# Patient Record
Sex: MALE | Race: WHITE | Employment: FULL TIME | URBAN - NONMETROPOLITAN AREA
[De-identification: names, ages, dates, MRNs, and addresses within clinical notes are randomized per-mention and may not be internally consistent; named-entity substitution may affect disease eponyms.]

---

## 2022-09-15 ENCOUNTER — HOSPITAL ENCOUNTER (INPATIENT)
Age: 62
LOS: 7 days | Discharge: HOME HEALTH | DRG: 252 | End: 2022-09-22
Attending: EMERGENCY MEDICINE | Admitting: STUDENT IN AN ORGANIZED HEALTH CARE EDUCATION/TRAINING PROGRAM
Payer: COMMERCIAL

## 2022-09-15 ENCOUNTER — APPOINTMENT (OUTPATIENT)
Dept: INTERVENTIONAL RADIOLOGY/VASCULAR | Age: 62
DRG: 252 | End: 2022-09-15
Payer: COMMERCIAL

## 2022-09-15 ENCOUNTER — APPOINTMENT (OUTPATIENT)
Dept: GENERAL RADIOLOGY | Age: 62
DRG: 252 | End: 2022-09-15
Payer: COMMERCIAL

## 2022-09-15 ENCOUNTER — APPOINTMENT (OUTPATIENT)
Dept: CT IMAGING | Age: 62
DRG: 252 | End: 2022-09-15
Payer: COMMERCIAL

## 2022-09-15 DIAGNOSIS — I82.403 ACUTE DEEP VEIN THROMBOSIS (DVT) OF BOTH LOWER EXTREMITIES, UNSPECIFIED VEIN (HCC): ICD-10-CM

## 2022-09-15 DIAGNOSIS — I80.202 PHLEGMASIA CERULEA DOLENS OF LEFT LOWER EXTREMITY (HCC): ICD-10-CM

## 2022-09-15 DIAGNOSIS — I26.94 MULTIPLE SUBSEGMENTAL PULMONARY EMBOLI WITHOUT ACUTE COR PULMONALE (HCC): Primary | ICD-10-CM

## 2022-09-15 DIAGNOSIS — R60.9 SWELLING: ICD-10-CM

## 2022-09-15 DIAGNOSIS — R06.02 SOB (SHORTNESS OF BREATH): ICD-10-CM

## 2022-09-15 PROBLEM — I26.99 BILATERAL PULMONARY EMBOLISM (HCC): Status: ACTIVE | Noted: 2022-09-15

## 2022-09-15 LAB
ALBUMIN SERPL-MCNC: 3.6 G/DL (ref 3.5–5.1)
ALP BLD-CCNC: 123 U/L (ref 38–126)
ALT SERPL-CCNC: 47 U/L (ref 11–66)
ANION GAP SERPL CALCULATED.3IONS-SCNC: 16 MEQ/L (ref 8–16)
APTT: 30.8 SECONDS (ref 22–38)
AST SERPL-CCNC: 29 U/L (ref 5–40)
BASE EXCESS MIXED: -2 MMOL/L (ref -2–3)
BASOPHILS # BLD: 0.6 %
BASOPHILS ABSOLUTE: 0.1 THOU/MM3 (ref 0–0.1)
BILIRUB SERPL-MCNC: 1.3 MG/DL (ref 0.3–1.2)
BUN BLDV-MCNC: 41 MG/DL (ref 7–22)
CALCIUM SERPL-MCNC: 9.4 MG/DL (ref 8.5–10.5)
CHLORIDE BLD-SCNC: 93 MEQ/L (ref 98–111)
CO2: 20 MEQ/L (ref 23–33)
COLLECTED BY:: ABNORMAL
CREAT SERPL-MCNC: 2.1 MG/DL (ref 0.4–1.2)
CREATININE URINE: 173.3 MG/DL
EOSINOPHIL # BLD: 0.1 %
EOSINOPHILS ABSOLUTE: 0 THOU/MM3 (ref 0–0.4)
ERYTHROCYTE [DISTWIDTH] IN BLOOD BY AUTOMATED COUNT: 15.5 % (ref 11.5–14.5)
ERYTHROCYTE [DISTWIDTH] IN BLOOD BY AUTOMATED COUNT: 50.4 FL (ref 35–45)
GFR SERPL CREATININE-BSD FRML MDRD: 32 ML/MIN/1.73M2
GLUCOSE BLD-MCNC: 132 MG/DL (ref 70–108)
HCO3, MIXED: 21 MMOL/L (ref 23–28)
HCT VFR BLD CALC: 38.6 % (ref 42–52)
HEMOGLOBIN: 12.8 GM/DL (ref 14–18)
IMMATURE GRANS (ABS): 0.51 THOU/MM3 (ref 0–0.07)
IMMATURE GRANULOCYTES: 2.1 %
INR BLD: 1.26 (ref 0.85–1.13)
LACTIC ACID, SEPSIS: 1.7 MMOL/L (ref 0.5–1.9)
LYMPHOCYTES # BLD: 7 %
LYMPHOCYTES ABSOLUTE: 1.7 THOU/MM3 (ref 1–4.8)
MCH RBC QN AUTO: 29.4 PG (ref 26–33)
MCHC RBC AUTO-ENTMCNC: 33.2 GM/DL (ref 32.2–35.5)
MCV RBC AUTO: 88.5 FL (ref 80–94)
MONOCYTES # BLD: 6 %
MONOCYTES ABSOLUTE: 1.5 THOU/MM3 (ref 0.4–1.3)
NUCLEATED RED BLOOD CELLS: 0 /100 WBC
O2 SAT, MIXED: 93 %
OSMOLALITY CALCULATION: 270.9 MOSMOL/KG (ref 275–300)
PCO2, MIXED VENOUS: 29 MMHG (ref 41–51)
PH, MIXED: 7.46 (ref 7.31–7.41)
PLATELET # BLD: 568 THOU/MM3 (ref 130–400)
PLATELET ESTIMATE: ABNORMAL
PMV BLD AUTO: 11 FL (ref 9.4–12.4)
PO2 MIXED: 62 MMHG (ref 25–40)
POTASSIUM REFLEX MAGNESIUM: 5.6 MEQ/L (ref 3.5–5.2)
PRO-BNP: 1422 PG/ML (ref 0–900)
PROCALCITONIN: 0.54 NG/ML (ref 0.01–0.09)
RBC # BLD: 4.36 MILL/MM3 (ref 4.7–6.1)
SCAN OF BLOOD SMEAR: NORMAL
SEG NEUTROPHILS: 84.2 %
SEGMENTED NEUTROPHILS ABSOLUTE COUNT: 20.6 THOU/MM3 (ref 1.8–7.7)
SODIUM BLD-SCNC: 129 MEQ/L (ref 135–145)
SODIUM URINE: 31 MEQ/L
TOTAL PROTEIN: 8.1 G/DL (ref 6.1–8)
TROPONIN T: < 0.01 NG/ML
WBC # BLD: 24.5 THOU/MM3 (ref 4.8–10.8)

## 2022-09-15 PROCEDURE — 75825 VEIN X-RAY TRUNK: CPT

## 2022-09-15 PROCEDURE — 96374 THER/PROPH/DIAG INJ IV PUSH: CPT

## 2022-09-15 PROCEDURE — 85730 THROMBOPLASTIN TIME PARTIAL: CPT

## 2022-09-15 PROCEDURE — 75820 VEIN X-RAY ARM/LEG: CPT

## 2022-09-15 PROCEDURE — 2720000010 IR GUIDED THROMB MECH VEIN

## 2022-09-15 PROCEDURE — 71275 CT ANGIOGRAPHY CHEST: CPT

## 2022-09-15 PROCEDURE — 36010 PLACE CATHETER IN VEIN: CPT

## 2022-09-15 PROCEDURE — 2060000000 HC ICU INTERMEDIATE R&B

## 2022-09-15 PROCEDURE — 85610 PROTHROMBIN TIME: CPT

## 2022-09-15 PROCEDURE — 81001 URINALYSIS AUTO W/SCOPE: CPT

## 2022-09-15 PROCEDURE — 6360000002 HC RX W HCPCS: Performed by: RADIOLOGY

## 2022-09-15 PROCEDURE — 83605 ASSAY OF LACTIC ACID: CPT

## 2022-09-15 PROCEDURE — 96361 HYDRATE IV INFUSION ADD-ON: CPT

## 2022-09-15 PROCEDURE — 85025 COMPLETE CBC W/AUTO DIFF WBC: CPT

## 2022-09-15 PROCEDURE — 76499 UNLISTED DX RADIOGRAPHIC PX: CPT

## 2022-09-15 PROCEDURE — 93005 ELECTROCARDIOGRAM TRACING: CPT | Performed by: STUDENT IN AN ORGANIZED HEALTH CARE EDUCATION/TRAINING PROGRAM

## 2022-09-15 PROCEDURE — 84145 PROCALCITONIN (PCT): CPT

## 2022-09-15 PROCEDURE — 80053 COMPREHEN METABOLIC PANEL: CPT

## 2022-09-15 PROCEDURE — 83880 ASSAY OF NATRIURETIC PEPTIDE: CPT

## 2022-09-15 PROCEDURE — 37248 TRLUML BALO ANGIOP 1ST VEIN: CPT

## 2022-09-15 PROCEDURE — 84300 ASSAY OF URINE SODIUM: CPT

## 2022-09-15 PROCEDURE — 6360000004 HC RX CONTRAST MEDICATION: Performed by: RADIOLOGY

## 2022-09-15 PROCEDURE — 2580000003 HC RX 258: Performed by: STUDENT IN AN ORGANIZED HEALTH CARE EDUCATION/TRAINING PROGRAM

## 2022-09-15 PROCEDURE — 99285 EMERGENCY DEPT VISIT HI MDM: CPT

## 2022-09-15 PROCEDURE — 37249 TRLUML BALO ANGIOP ADDL VEIN: CPT

## 2022-09-15 PROCEDURE — 6360000004 HC RX CONTRAST MEDICATION: Performed by: STUDENT IN AN ORGANIZED HEALTH CARE EDUCATION/TRAINING PROGRAM

## 2022-09-15 PROCEDURE — 6360000002 HC RX W HCPCS: Performed by: STUDENT IN AN ORGANIZED HEALTH CARE EDUCATION/TRAINING PROGRAM

## 2022-09-15 PROCEDURE — 71045 X-RAY EXAM CHEST 1 VIEW: CPT

## 2022-09-15 PROCEDURE — 37187 VENOUS MECH THROMBECTOMY: CPT

## 2022-09-15 PROCEDURE — 96365 THER/PROPH/DIAG IV INF INIT: CPT

## 2022-09-15 PROCEDURE — 047C3ZZ DILATION OF RIGHT COMMON ILIAC ARTERY, PERCUTANEOUS APPROACH: ICD-10-PCS | Performed by: RADIOLOGY

## 2022-09-15 PROCEDURE — 82803 BLOOD GASES ANY COMBINATION: CPT

## 2022-09-15 PROCEDURE — 96375 TX/PRO/DX INJ NEW DRUG ADDON: CPT

## 2022-09-15 PROCEDURE — 93970 EXTREMITY STUDY: CPT

## 2022-09-15 PROCEDURE — 82570 ASSAY OF URINE CREATININE: CPT

## 2022-09-15 PROCEDURE — 069D3ZZ DRAINAGE OF LEFT COMMON ILIAC VEIN, PERCUTANEOUS APPROACH: ICD-10-PCS | Performed by: RADIOLOGY

## 2022-09-15 PROCEDURE — 84484 ASSAY OF TROPONIN QUANT: CPT

## 2022-09-15 PROCEDURE — 99223 1ST HOSP IP/OBS HIGH 75: CPT | Performed by: STUDENT IN AN ORGANIZED HEALTH CARE EDUCATION/TRAINING PROGRAM

## 2022-09-15 RX ORDER — HEPARIN SODIUM 1000 [USP'U]/ML
INJECTION, SOLUTION INTRAVENOUS; SUBCUTANEOUS
Status: COMPLETED | OUTPATIENT
Start: 2022-09-15 | End: 2022-09-15

## 2022-09-15 RX ORDER — 0.9 % SODIUM CHLORIDE 0.9 %
1000 INTRAVENOUS SOLUTION INTRAVENOUS ONCE
Status: COMPLETED | OUTPATIENT
Start: 2022-09-15 | End: 2022-09-15

## 2022-09-15 RX ORDER — MORPHINE SULFATE 2 MG/ML
2 INJECTION, SOLUTION INTRAMUSCULAR; INTRAVENOUS
Status: DISCONTINUED | OUTPATIENT
Start: 2022-09-15 | End: 2022-09-22 | Stop reason: HOSPADM

## 2022-09-15 RX ORDER — HEPARIN SODIUM 1000 [USP'U]/ML
40 INJECTION, SOLUTION INTRAVENOUS; SUBCUTANEOUS PRN
Status: DISCONTINUED | OUTPATIENT
Start: 2022-09-15 | End: 2022-09-17

## 2022-09-15 RX ORDER — KETOROLAC TROMETHAMINE 30 MG/ML
15 INJECTION, SOLUTION INTRAMUSCULAR; INTRAVENOUS EVERY 6 HOURS PRN
Status: DISCONTINUED | OUTPATIENT
Start: 2022-09-15 | End: 2022-09-15

## 2022-09-15 RX ORDER — LISINOPRIL 20 MG/1
20 TABLET ORAL DAILY
Status: DISCONTINUED | OUTPATIENT
Start: 2022-09-16 | End: 2022-09-18

## 2022-09-15 RX ORDER — POLYETHYLENE GLYCOL 3350 17 G/17G
17 POWDER, FOR SOLUTION ORAL DAILY PRN
Status: DISCONTINUED | OUTPATIENT
Start: 2022-09-15 | End: 2022-09-22 | Stop reason: HOSPADM

## 2022-09-15 RX ORDER — HEPARIN SODIUM 10000 [USP'U]/100ML
5-30 INJECTION, SOLUTION INTRAVENOUS CONTINUOUS
Status: DISCONTINUED | OUTPATIENT
Start: 2022-09-15 | End: 2022-09-17

## 2022-09-15 RX ORDER — SODIUM CHLORIDE 0.9 % (FLUSH) 0.9 %
5-40 SYRINGE (ML) INJECTION EVERY 12 HOURS SCHEDULED
Status: DISCONTINUED | OUTPATIENT
Start: 2022-09-15 | End: 2022-09-22 | Stop reason: HOSPADM

## 2022-09-15 RX ORDER — MIDAZOLAM HYDROCHLORIDE 1 MG/ML
INJECTION INTRAMUSCULAR; INTRAVENOUS
Status: COMPLETED | OUTPATIENT
Start: 2022-09-15 | End: 2022-09-15

## 2022-09-15 RX ORDER — SODIUM CHLORIDE 9 MG/ML
INJECTION, SOLUTION INTRAVENOUS PRN
Status: DISCONTINUED | OUTPATIENT
Start: 2022-09-15 | End: 2022-09-22 | Stop reason: HOSPADM

## 2022-09-15 RX ORDER — FENTANYL CITRATE 50 UG/ML
INJECTION, SOLUTION INTRAMUSCULAR; INTRAVENOUS
Status: COMPLETED | OUTPATIENT
Start: 2022-09-15 | End: 2022-09-15

## 2022-09-15 RX ORDER — ONDANSETRON 4 MG/1
4 TABLET, ORALLY DISINTEGRATING ORAL EVERY 8 HOURS PRN
Status: DISCONTINUED | OUTPATIENT
Start: 2022-09-15 | End: 2022-09-22 | Stop reason: HOSPADM

## 2022-09-15 RX ORDER — ACETAMINOPHEN 325 MG/1
650 TABLET ORAL EVERY 6 HOURS PRN
Status: DISCONTINUED | OUTPATIENT
Start: 2022-09-15 | End: 2022-09-22 | Stop reason: HOSPADM

## 2022-09-15 RX ORDER — MORPHINE SULFATE 4 MG/ML
4 INJECTION, SOLUTION INTRAMUSCULAR; INTRAVENOUS
Status: DISCONTINUED | OUTPATIENT
Start: 2022-09-15 | End: 2022-09-22 | Stop reason: HOSPADM

## 2022-09-15 RX ORDER — HEPARIN SODIUM 1000 [USP'U]/ML
80 INJECTION, SOLUTION INTRAVENOUS; SUBCUTANEOUS PRN
Status: DISCONTINUED | OUTPATIENT
Start: 2022-09-15 | End: 2022-09-17

## 2022-09-15 RX ORDER — SODIUM CHLORIDE 9 MG/ML
INJECTION, SOLUTION INTRAVENOUS CONTINUOUS
Status: DISCONTINUED | OUTPATIENT
Start: 2022-09-15 | End: 2022-09-19

## 2022-09-15 RX ORDER — HEPARIN SODIUM 1000 [USP'U]/ML
80 INJECTION, SOLUTION INTRAVENOUS; SUBCUTANEOUS ONCE
Status: COMPLETED | OUTPATIENT
Start: 2022-09-15 | End: 2022-09-15

## 2022-09-15 RX ORDER — SODIUM CHLORIDE 0.9 % (FLUSH) 0.9 %
5-40 SYRINGE (ML) INJECTION PRN
Status: DISCONTINUED | OUTPATIENT
Start: 2022-09-15 | End: 2022-09-22 | Stop reason: HOSPADM

## 2022-09-15 RX ORDER — DOCUSATE SODIUM 100 MG/1
100 CAPSULE, LIQUID FILLED ORAL 2 TIMES DAILY PRN
Status: DISCONTINUED | OUTPATIENT
Start: 2022-09-15 | End: 2022-09-22 | Stop reason: HOSPADM

## 2022-09-15 RX ORDER — ONDANSETRON 2 MG/ML
4 INJECTION INTRAMUSCULAR; INTRAVENOUS EVERY 6 HOURS PRN
Status: DISCONTINUED | OUTPATIENT
Start: 2022-09-15 | End: 2022-09-22 | Stop reason: HOSPADM

## 2022-09-15 RX ADMIN — FENTANYL CITRATE 25 MCG: 50 INJECTION, SOLUTION INTRAMUSCULAR; INTRAVENOUS at 21:59

## 2022-09-15 RX ADMIN — IOPAMIDOL 70 ML: 612 INJECTION, SOLUTION INTRAVENOUS at 23:15

## 2022-09-15 RX ADMIN — HEPARIN SODIUM AND DEXTROSE 18 UNITS/KG/HR: 10000; 5 INJECTION INTRAVENOUS at 19:44

## 2022-09-15 RX ADMIN — HEPARIN SODIUM 6310 UNITS: 1000 INJECTION, SOLUTION INTRAVENOUS; SUBCUTANEOUS at 19:35

## 2022-09-15 RX ADMIN — IOPAMIDOL 80 ML: 755 INJECTION, SOLUTION INTRAVENOUS at 18:29

## 2022-09-15 RX ADMIN — MIDAZOLAM 0.5 MG: 1 INJECTION INTRAMUSCULAR; INTRAVENOUS at 21:58

## 2022-09-15 RX ADMIN — MIDAZOLAM 0.5 MG: 1 INJECTION INTRAMUSCULAR; INTRAVENOUS at 22:25

## 2022-09-15 RX ADMIN — HEPARIN SODIUM 2000 UNITS: 1000 INJECTION INTRAVENOUS; SUBCUTANEOUS at 22:41

## 2022-09-15 RX ADMIN — SODIUM CHLORIDE 1000 ML: 9 INJECTION, SOLUTION INTRAVENOUS at 17:54

## 2022-09-15 RX ADMIN — PIPERACILLIN AND TAZOBACTAM 4500 MG: 4; .5 INJECTION, POWDER, LYOPHILIZED, FOR SOLUTION INTRAVENOUS at 20:31

## 2022-09-15 RX ADMIN — FENTANYL CITRATE 25 MCG: 50 INJECTION, SOLUTION INTRAMUSCULAR; INTRAVENOUS at 22:25

## 2022-09-15 ASSESSMENT — PAIN - FUNCTIONAL ASSESSMENT
PAIN_FUNCTIONAL_ASSESSMENT: 0-10
PAIN_FUNCTIONAL_ASSESSMENT: 0-10

## 2022-09-15 ASSESSMENT — PAIN DESCRIPTION - ORIENTATION: ORIENTATION: LEFT

## 2022-09-15 ASSESSMENT — PAIN DESCRIPTION - LOCATION: LOCATION: LEG

## 2022-09-15 ASSESSMENT — PAIN SCALES - GENERAL
PAINLEVEL_OUTOF10: 4
PAINLEVEL_OUTOF10: 0
PAINLEVEL_OUTOF10: 7
PAINLEVEL_OUTOF10: 4

## 2022-09-15 NOTE — ED TRIAGE NOTES
Pt to rm 15 per EMS- sent in by [facility] for sudden onset SOB and left upper leg pain while at rehab today. Pt states he had AAA repair at the end of August in [place]- staples intact, incision well approximated. Pt LLE is noticeably more swollen than the right, aleksey in color and painful to palpation upper thigh. Difficulty palpating pedal pulse- doppler used to locate post tibial pulse. Pt states pain in leg 7/10- denies CP- states he feels very SOB- tachypnic, sats 98% on 3LNC but RR still not coming down.

## 2022-09-15 NOTE — ED NOTES
Dr Ledy Byrd at bedside for assessment.  Attempting to find LLE pulses w/doppler     Buffy Mejia RN  09/15/22 8551

## 2022-09-15 NOTE — ED NOTES
Pt heparin started. Pt denies a need for anything else at this time.      Chapincito Waddell RN  09/15/22 1946

## 2022-09-16 ENCOUNTER — APPOINTMENT (OUTPATIENT)
Dept: CT IMAGING | Age: 62
DRG: 252 | End: 2022-09-16
Payer: COMMERCIAL

## 2022-09-16 ENCOUNTER — APPOINTMENT (OUTPATIENT)
Dept: INTERVENTIONAL RADIOLOGY/VASCULAR | Age: 62
DRG: 252 | End: 2022-09-16
Payer: COMMERCIAL

## 2022-09-16 PROBLEM — D72.829 LEUKOCYTOSIS: Status: ACTIVE | Noted: 2022-09-16

## 2022-09-16 PROBLEM — E87.6 HYPOKALEMIA: Status: ACTIVE | Noted: 2022-09-16

## 2022-09-16 PROBLEM — I82.413 ACUTE BILATERAL DEEP VEIN THROMBOSIS (DVT) OF FEMORAL VEINS (HCC): Status: ACTIVE | Noted: 2022-09-16

## 2022-09-16 PROBLEM — E87.1 HYPONATREMIA: Status: ACTIVE | Noted: 2022-09-16

## 2022-09-16 PROBLEM — R53.81 PHYSICAL DECONDITIONING: Status: ACTIVE | Noted: 2022-09-16

## 2022-09-16 PROBLEM — R93.89 ABNORMAL CT OF THE CHEST: Status: ACTIVE | Noted: 2022-09-16

## 2022-09-16 PROBLEM — N17.9 AKI (ACUTE KIDNEY INJURY) (HCC): Status: ACTIVE | Noted: 2022-09-16

## 2022-09-16 PROBLEM — D75.839 THROMBOCYTOSIS: Status: ACTIVE | Noted: 2022-09-16

## 2022-09-16 PROBLEM — I26.94 MULTIPLE SUBSEGMENTAL PULMONARY EMBOLI WITHOUT ACUTE COR PULMONALE (HCC): Status: ACTIVE | Noted: 2022-09-15

## 2022-09-16 PROBLEM — I87.1: Status: ACTIVE | Noted: 2022-09-16

## 2022-09-16 LAB
ALBUMIN SERPL-MCNC: 2.8 G/DL (ref 3.5–5.1)
ALP BLD-CCNC: 99 U/L (ref 38–126)
ALT SERPL-CCNC: 36 U/L (ref 11–66)
ANION GAP SERPL CALCULATED.3IONS-SCNC: 13 MEQ/L (ref 8–16)
ANION GAP SERPL CALCULATED.3IONS-SCNC: 14 MEQ/L (ref 8–16)
AST SERPL-CCNC: 25 U/L (ref 5–40)
BACTERIA: ABNORMAL /HPF
BASOPHILS # BLD: 0.5 %
BASOPHILS ABSOLUTE: 0.1 THOU/MM3 (ref 0–0.1)
BILIRUB SERPL-MCNC: 1 MG/DL (ref 0.3–1.2)
BILIRUBIN URINE: NEGATIVE
BLOOD, URINE: ABNORMAL
BUN BLDV-MCNC: 41 MG/DL (ref 7–22)
BUN BLDV-MCNC: 43 MG/DL (ref 7–22)
CALCIUM SERPL-MCNC: 8.4 MG/DL (ref 8.5–10.5)
CALCIUM SERPL-MCNC: 8.5 MG/DL (ref 8.5–10.5)
CASTS 2: ABNORMAL /LPF
CASTS UA: ABNORMAL /LPF
CHARACTER, URINE: CLEAR
CHLORIDE BLD-SCNC: 95 MEQ/L (ref 98–111)
CHLORIDE BLD-SCNC: 96 MEQ/L (ref 98–111)
CO2: 20 MEQ/L (ref 23–33)
CO2: 22 MEQ/L (ref 23–33)
COLOR: ABNORMAL
CREAT SERPL-MCNC: 1.8 MG/DL (ref 0.4–1.2)
CREAT SERPL-MCNC: 1.9 MG/DL (ref 0.4–1.2)
CRYSTALS, UA: ABNORMAL
EKG ATRIAL RATE: 116 BPM
EKG ATRIAL RATE: 122 BPM
EKG P AXIS: 75 DEGREES
EKG P AXIS: 80 DEGREES
EKG P-R INTERVAL: 114 MS
EKG P-R INTERVAL: 118 MS
EKG Q-T INTERVAL: 306 MS
EKG Q-T INTERVAL: 324 MS
EKG QRS DURATION: 90 MS
EKG QRS DURATION: 92 MS
EKG QTC CALCULATION (BAZETT): 436 MS
EKG QTC CALCULATION (BAZETT): 450 MS
EKG R AXIS: 63 DEGREES
EKG R AXIS: 67 DEGREES
EKG T AXIS: 152 DEGREES
EKG T AXIS: 162 DEGREES
EKG VENTRICULAR RATE: 116 BPM
EKG VENTRICULAR RATE: 122 BPM
EOSINOPHIL # BLD: 0 %
EOSINOPHILS ABSOLUTE: 0 THOU/MM3 (ref 0–0.4)
EPITHELIAL CELLS, UA: ABNORMAL /HPF
ERYTHROCYTE [DISTWIDTH] IN BLOOD BY AUTOMATED COUNT: 15.4 % (ref 11.5–14.5)
ERYTHROCYTE [DISTWIDTH] IN BLOOD BY AUTOMATED COUNT: 50.1 FL (ref 35–45)
GFR SERPL CREATININE-BSD FRML MDRD: 36 ML/MIN/1.73M2
GFR SERPL CREATININE-BSD FRML MDRD: 39 ML/MIN/1.73M2
GLUCOSE BLD-MCNC: 117 MG/DL (ref 70–108)
GLUCOSE BLD-MCNC: 136 MG/DL (ref 70–108)
GLUCOSE URINE: NEGATIVE MG/DL
HCT VFR BLD CALC: 32 % (ref 42–52)
HEMOGLOBIN: 10.5 GM/DL (ref 14–18)
HEPARIN UNFRACTIONATED: 0.12 U/ML (ref 0.3–0.7)
HEPARIN UNFRACTIONATED: 0.15 U/ML (ref 0.3–0.7)
HEPARIN UNFRACTIONATED: 0.2 U/ML (ref 0.3–0.7)
HEPARIN UNFRACTIONATED: 0.3 U/ML (ref 0.3–0.7)
IMMATURE GRANS (ABS): 0.37 THOU/MM3 (ref 0–0.07)
IMMATURE GRANULOCYTES: 1.8 %
KETONES, URINE: NEGATIVE
LEUKOCYTE ESTERASE, URINE: NEGATIVE
LV EF: 40 %
LVEF MODALITY: NORMAL
LYMPHOCYTES # BLD: 8.6 %
LYMPHOCYTES ABSOLUTE: 1.8 THOU/MM3 (ref 1–4.8)
MAGNESIUM: 2 MG/DL (ref 1.6–2.4)
MCH RBC QN AUTO: 29.2 PG (ref 26–33)
MCHC RBC AUTO-ENTMCNC: 32.8 GM/DL (ref 32.2–35.5)
MCV RBC AUTO: 89.1 FL (ref 80–94)
MISCELLANEOUS 2: ABNORMAL
MONOCYTES # BLD: 7.3 %
MONOCYTES ABSOLUTE: 1.5 THOU/MM3 (ref 0.4–1.3)
NITRITE, URINE: NEGATIVE
NUCLEATED RED BLOOD CELLS: 0 /100 WBC
OSMOLALITY CALCULATION: 271.9 MOSMOL/KG (ref 275–300)
PH UA: 5 (ref 5–9)
PLATELET # BLD: 402 THOU/MM3 (ref 130–400)
PMV BLD AUTO: 10.6 FL (ref 9.4–12.4)
POTASSIUM REFLEX MAGNESIUM: 5 MEQ/L (ref 3.5–5.2)
POTASSIUM SERPL-SCNC: 4.5 MEQ/L (ref 3.5–5.2)
PROTEIN UA: ABNORMAL
RBC # BLD: 3.59 MILL/MM3 (ref 4.7–6.1)
RBC URINE: ABNORMAL /HPF
RENAL EPITHELIAL, UA: ABNORMAL
SEG NEUTROPHILS: 81.8 %
SEGMENTED NEUTROPHILS ABSOLUTE COUNT: 17 THOU/MM3 (ref 1.8–7.7)
SODIUM BLD-SCNC: 129 MEQ/L (ref 135–145)
SODIUM BLD-SCNC: 131 MEQ/L (ref 135–145)
SPECIFIC GRAVITY, URINE: 1.03 (ref 1–1.03)
TOTAL PROTEIN: 6.5 G/DL (ref 6.1–8)
UROBILINOGEN, URINE: 0.2 EU/DL (ref 0–1)
WBC # BLD: 20.8 THOU/MM3 (ref 4.8–10.8)
WBC UA: ABNORMAL /HPF
YEAST: ABNORMAL

## 2022-09-16 PROCEDURE — 80053 COMPREHEN METABOLIC PANEL: CPT

## 2022-09-16 PROCEDURE — 85520 HEPARIN ASSAY: CPT

## 2022-09-16 PROCEDURE — 6360000002 HC RX W HCPCS: Performed by: STUDENT IN AN ORGANIZED HEALTH CARE EDUCATION/TRAINING PROGRAM

## 2022-09-16 PROCEDURE — 6360000002 HC RX W HCPCS: Performed by: RADIOLOGY

## 2022-09-16 PROCEDURE — 2060000000 HC ICU INTERMEDIATE R&B

## 2022-09-16 PROCEDURE — 6360000004 HC RX CONTRAST MEDICATION: Performed by: RADIOLOGY

## 2022-09-16 PROCEDURE — 36415 COLL VENOUS BLD VENIPUNCTURE: CPT

## 2022-09-16 PROCEDURE — 6370000000 HC RX 637 (ALT 250 FOR IP): Performed by: STUDENT IN AN ORGANIZED HEALTH CARE EDUCATION/TRAINING PROGRAM

## 2022-09-16 PROCEDURE — 37238 OPEN/PERQ PLACE STENT SAME: CPT

## 2022-09-16 PROCEDURE — 36011 PLACE CATHETER IN VEIN: CPT

## 2022-09-16 PROCEDURE — 74176 CT ABD & PELVIS W/O CONTRAST: CPT

## 2022-09-16 PROCEDURE — 93010 ELECTROCARDIOGRAM REPORT: CPT | Performed by: INTERNAL MEDICINE

## 2022-09-16 PROCEDURE — 2580000003 HC RX 258: Performed by: STUDENT IN AN ORGANIZED HEALTH CARE EDUCATION/TRAINING PROGRAM

## 2022-09-16 PROCEDURE — 2709999900

## 2022-09-16 PROCEDURE — C1876 STENT, NON-COA/NON-COV W/DEL: HCPCS

## 2022-09-16 PROCEDURE — 83735 ASSAY OF MAGNESIUM: CPT

## 2022-09-16 PROCEDURE — 93306 TTE W/DOPPLER COMPLETE: CPT

## 2022-09-16 PROCEDURE — 37248 TRLUML BALO ANGIOP 1ST VEIN: CPT

## 2022-09-16 PROCEDURE — 37187 VENOUS MECH THROMBECTOMY: CPT

## 2022-09-16 PROCEDURE — 75820 VEIN X-RAY ARM/LEG: CPT

## 2022-09-16 PROCEDURE — 99233 SBSQ HOSP IP/OBS HIGH 50: CPT | Performed by: FAMILY MEDICINE

## 2022-09-16 PROCEDURE — 85025 COMPLETE CBC W/AUTO DIFF WBC: CPT

## 2022-09-16 PROCEDURE — 93005 ELECTROCARDIOGRAM TRACING: CPT | Performed by: STUDENT IN AN ORGANIZED HEALTH CARE EDUCATION/TRAINING PROGRAM

## 2022-09-16 PROCEDURE — 75825 VEIN X-RAY TRUNK: CPT

## 2022-09-16 RX ORDER — POTASSIUM CHLORIDE 7.45 MG/ML
10 INJECTION INTRAVENOUS PRN
Status: DISCONTINUED | OUTPATIENT
Start: 2022-09-16 | End: 2022-09-22 | Stop reason: HOSPADM

## 2022-09-16 RX ORDER — FENTANYL CITRATE 50 UG/ML
INJECTION, SOLUTION INTRAMUSCULAR; INTRAVENOUS
Status: COMPLETED | OUTPATIENT
Start: 2022-09-16 | End: 2022-09-16

## 2022-09-16 RX ORDER — MIDAZOLAM HYDROCHLORIDE 1 MG/ML
INJECTION INTRAMUSCULAR; INTRAVENOUS
Status: COMPLETED | OUTPATIENT
Start: 2022-09-16 | End: 2022-09-16

## 2022-09-16 RX ORDER — MAGNESIUM SULFATE IN WATER 40 MG/ML
2000 INJECTION, SOLUTION INTRAVENOUS PRN
Status: DISCONTINUED | OUTPATIENT
Start: 2022-09-16 | End: 2022-09-22 | Stop reason: HOSPADM

## 2022-09-16 RX ORDER — POTASSIUM CHLORIDE 20 MEQ/1
40 TABLET, EXTENDED RELEASE ORAL PRN
Status: DISCONTINUED | OUTPATIENT
Start: 2022-09-16 | End: 2022-09-22 | Stop reason: HOSPADM

## 2022-09-16 RX ADMIN — SODIUM CHLORIDE: 9 INJECTION, SOLUTION INTRAVENOUS at 16:37

## 2022-09-16 RX ADMIN — CEFTRIAXONE SODIUM 1000 MG: 1 INJECTION, POWDER, FOR SOLUTION INTRAMUSCULAR; INTRAVENOUS at 23:21

## 2022-09-16 RX ADMIN — CEFTRIAXONE SODIUM 1000 MG: 1 INJECTION, POWDER, FOR SOLUTION INTRAMUSCULAR; INTRAVENOUS at 01:12

## 2022-09-16 RX ADMIN — HEPARIN SODIUM AND DEXTROSE 20 UNITS/KG/HR: 10000; 5 INJECTION INTRAVENOUS at 09:02

## 2022-09-16 RX ADMIN — SODIUM CHLORIDE: 9 INJECTION, SOLUTION INTRAVENOUS at 09:10

## 2022-09-16 RX ADMIN — HEPARIN SODIUM AND DEXTROSE 20 UNITS/KG/HR: 10000; 5 INJECTION INTRAVENOUS at 13:26

## 2022-09-16 RX ADMIN — MIDAZOLAM 1 MG: 1 INJECTION INTRAMUSCULAR; INTRAVENOUS at 14:58

## 2022-09-16 RX ADMIN — FENTANYL CITRATE 50 MCG: 50 INJECTION, SOLUTION INTRAMUSCULAR; INTRAVENOUS at 14:24

## 2022-09-16 RX ADMIN — MIDAZOLAM 1 MG: 1 INJECTION INTRAMUSCULAR; INTRAVENOUS at 13:22

## 2022-09-16 RX ADMIN — SODIUM CHLORIDE, PRESERVATIVE FREE 10 ML: 5 INJECTION INTRAVENOUS at 00:54

## 2022-09-16 RX ADMIN — HEPARIN SODIUM 3160 UNITS: 1000 INJECTION, SOLUTION INTRAVENOUS; SUBCUTANEOUS at 17:54

## 2022-09-16 RX ADMIN — MIDAZOLAM 1 MG: 1 INJECTION INTRAMUSCULAR; INTRAVENOUS at 14:24

## 2022-09-16 RX ADMIN — SODIUM CHLORIDE: 9 INJECTION, SOLUTION INTRAVENOUS at 00:54

## 2022-09-16 RX ADMIN — ONDANSETRON 4 MG: 4 TABLET, ORALLY DISINTEGRATING ORAL at 21:55

## 2022-09-16 RX ADMIN — IOPAMIDOL 110 ML: 612 INJECTION, SOLUTION INTRAVENOUS at 15:31

## 2022-09-16 RX ADMIN — FENTANYL CITRATE 50 MCG: 50 INJECTION, SOLUTION INTRAMUSCULAR; INTRAVENOUS at 14:58

## 2022-09-16 RX ADMIN — HEPARIN SODIUM 3160 UNITS: 1000 INJECTION, SOLUTION INTRAVENOUS; SUBCUTANEOUS at 08:57

## 2022-09-16 RX ADMIN — FENTANYL CITRATE 50 MCG: 50 INJECTION, SOLUTION INTRAMUSCULAR; INTRAVENOUS at 13:22

## 2022-09-16 ASSESSMENT — ENCOUNTER SYMPTOMS
CHEST TIGHTNESS: 0
SINUS PAIN: 0
VOMITING: 0
COLOR CHANGE: 1
NAUSEA: 0
SHORTNESS OF BREATH: 1
EYE ITCHING: 0
RHINORRHEA: 0
EYE REDNESS: 0
CONSTIPATION: 0
EYE DISCHARGE: 0
ABDOMINAL PAIN: 0
ABDOMINAL DISTENTION: 0
EYE PAIN: 0
DIARRHEA: 0
SINUS PRESSURE: 0

## 2022-09-16 ASSESSMENT — PAIN DESCRIPTION - DESCRIPTORS: DESCRIPTORS: ACHING

## 2022-09-16 ASSESSMENT — PAIN SCALES - GENERAL: PAINLEVEL_OUTOF10: 5

## 2022-09-16 ASSESSMENT — PAIN DESCRIPTION - LOCATION: LOCATION: LEG

## 2022-09-16 NOTE — PLAN OF CARE
Problem: Discharge Planning  Goal: Discharge to home or other facility with appropriate resources  Outcome: Progressing  SW consult received and completed. See SW note.

## 2022-09-16 NOTE — FLOWSHEET NOTE
09/16/22 1107   Safe Environment   Safety Measures Other (comment)  (Virtual Safety Round Complete)   Virtual Nurse introduced, pt denies needs at this time.

## 2022-09-16 NOTE — ED NOTES
Pt repositioned in bed for comfort. Pt denies a need for anything else at this time.      Carrie Hurd RN  09/15/22 2034

## 2022-09-16 NOTE — ED PROVIDER NOTES
[facility] ENCOUNTER          Pt Name: Esther Bernal  MRN: [id number]  [place] 1960  Date of evaluation: 9/15/2022  Treating Resident Physician: Monica Jerome DO  Supervising Physician: Dr. Nathaly Fritz    History obtained from the patient. CHIEF COMPLAINT       Chief Complaint   Patient presents with    Shortness of Breath     Sudden onset while at rehab after AAA repair in July [facility]  Esther Bernal is a 62 y.o. male who presents to the emergency department for evaluation of shortness of breath and left upper leg pain. The patient states that he recently had a abdominal aortic aneurysm repair at the end of August at [facility].  While at rehab today, he developed sudden onset left upper leg pain followed by shortness of breath. His left lower extremity is noticeably more swollen than the right and appears darkly erythematous and mottled. He is currently complaining of shortness of breath. He denies chest pain. Supplemental oxygen via nasal cannula started due to tachypnea and the patient is not hypoxic at triage. He denies a history of DVT or PE. The patient has no other acute complaints at this time. REVIEW OF SYSTEMS   Review of Systems   Constitutional:  Negative for fever. HENT:  Negative for ear pain, rhinorrhea, sinus pressure and sinus pain. Eyes:  Negative for pain, discharge, redness and itching. Respiratory:  Positive for shortness of breath. Negative for chest tightness. Cardiovascular:  Negative for chest pain and palpitations. Gastrointestinal:  Negative for abdominal distention, abdominal pain, constipation, diarrhea, nausea and vomiting. Genitourinary:  Negative for dysuria, frequency, hematuria and urgency. Musculoskeletal:         + Left leg swelling. Skin:  Positive for color change. Neurological:  Negative for dizziness, syncope and light-headedness. PAST MEDICAL AND SURGICAL HISTORY     Past Medical History:   Diagnosis Date    Acute deep vein thrombosis (DVT) of proximal vein of both lower extremities (HCC) 09/15/2022    Acute pulmonary embolism without acute cor pulmonale (HCC) 09/15/2022    bilateral, multilobular    History of ETOH abuse 10/19/2016    Hypertension     Pre-op evaluation: prior to abdominal hernia surgery 10/19/2016    Tobacco abuse 10/19/2016     Past Surgical History:   Procedure Laterality Date    ABDOMINAL AORTIC ANEURYSM REPAIR      UMBILICAL HERNIA REPAIR  11/22/2016    Mesh         MEDICATIONS     Current Facility-Administered Medications:     heparin (porcine) injection 6,310 Units, 80 Units/kg, IntraVENous, PRN, Bal Nails, DO    heparin (porcine) injection 3,160 Units, 40 Units/kg, IntraVENous, PRN, Bal Nails, DO    heparin 25,000 units in dextrose 5% 250 mL (premix) infusion, 5-30 Units/kg/hr, IntraVENous, Continuous, Bal Nails, DO, Last Rate: 14.2 mL/hr at 09/15/22 1944, 18 Units/kg/hr at 09/15/22 1944    sodium chloride flush 0.9 % injection 5-40 mL, 5-40 mL, IntraVENous, 2 times per day, Geni Prince MD, 10 mL at 09/16/22 0054    sodium chloride flush 0.9 % injection 5-40 mL, 5-40 mL, IntraVENous, PRN, Geni Prince MD    0.9 % sodium chloride infusion, , IntraVENous, PRN, Geni Prince MD    ondansetron (ZOFRAN-ODT) disintegrating tablet 4 mg, 4 mg, Oral, Q8H PRN **OR** ondansetron (ZOFRAN) injection 4 mg, 4 mg, IntraVENous, Q6H PRN, Geni Prince MD    polyethylene glycol (GLYCOLAX) packet 17 g, 17 g, Oral, Daily PRN, Geni Prince MD    acetaminophen (TYLENOL) tablet 650 mg, 650 mg, Oral, Q6H PRN **OR** acetaminophen (TYLENOL) suppository 650 mg, 650 mg, Rectal, Q6H PRN, Malachi Braswell MD    cefTRIAXone (ROCEPHIN) 1,000 mg in dextrose 5 % 50 mL IVPB mini-bag, 1,000 mg, IntraVENous, Q24H, Geni Prince MD, Stopped at 09/16/22 0150    docusate sodium (COLACE) capsule 100 mg, 100 mg, Oral, BID PRN, Darby Leon MD    [Held by provider] lisinopril (PRINIVIL;ZESTRIL) tablet 20 mg, 20 mg, Oral, Daily, Malachi Diez MD    0.9 % sodium chloride infusion, , IntraVENous, Continuous, Malachi Diez MD, Last Rate: 125 mL/hr at 09/16/22 0054, New Bag at 09/16/22 0054    morphine (PF) injection 2 mg, 2 mg, IntraVENous, Q2H PRN **OR** morphine injection 4 mg, 4 mg, IntraVENous, Q2H PRN, Malachi Diez MD      SOCIAL HISTORY     Social History     Social History Narrative    Not on file     Social History     Tobacco Use    Smoking status: Heavy Smoker     Packs/day: 0.50     Types: Cigarettes    Smokeless tobacco: Never   Substance Use Topics    Alcohol use: Yes     Comment: social    Drug use: No         ALLERGIES     Allergies   Allergen Reactions    Vancomycin Itching         FAMILY HISTORY     Family History   Problem Relation Age of Onset    Cancer Mother         lung         PREVIOUS RECORDS   Previous records reviewed        PHYSICAL EXAM     ED Triage Vitals [09/15/22 1721]   BP Temp Temp Source Heart Rate Resp SpO2 Height Weight   (!) 120/93 97.7 °F (36.5 °C) Oral (!) 124 28 96 % 6' (1.829 m) 174 lb (78.9 kg)     Initial vital signs and nursing assessment reviewed and abnormal from hypertension, tachycardia, tachypnea . Body mass index is 23.6 kg/m². Pulsoximetry is normal per my interpretation. Additional Vital Signs:  Vitals:    09/16/22 0245   BP: (!) 131/96   Pulse: (!) 112   Resp: 23   Temp:    SpO2: 96%       Physical Exam  Constitutional:       General: He is not in acute distress. Appearance: Normal appearance. He is ill-appearing. HENT:      Head: Normocephalic and atraumatic. Nose: Nose normal. No congestion or rhinorrhea. Mouth/Throat:      Mouth: Mucous membranes are moist.      Pharynx: Oropharynx is clear. No oropharyngeal exudate or posterior oropharyngeal erythema. Eyes:      Extraocular Movements: Extraocular movements intact.       Pupils: Pupils are equal, round, and reactive to light. Cardiovascular:      Rate and Rhythm: Regular rhythm. Tachycardia present. Heart sounds: Normal heart sounds. Comments: Left lower extremity posterior tibial pulse present on Doppler. Unable to Doppler left lower extremity dorsalis pedis pulse. Pulmonary:      Breath sounds: Normal breath sounds. Comments: Tachypnea. Abdominal:      General: Abdomen is flat. There is no distension. Palpations: Abdomen is soft. Tenderness: There is no abdominal tenderness. Comments: Midline abdominal incision clean, dry, and intact. Musculoskeletal:         General: Swelling and tenderness present. Normal range of motion. Cervical back: Normal range of motion and neck supple. Right lower leg: No edema. Left lower leg: Edema present. Comments: Diffuse swelling throughout the left lower extremity, which has a darkly erythematous and mottled appearance concerning for phlegmasia cerulea dolens. Skin:     General: Skin is warm and dry. Capillary Refill: Capillary refill takes less than 2 seconds. Neurological:      General: No focal deficit present. Mental Status: He is alert and oriented to person, place, and time. Mental status is at baseline. MEDICAL DECISION MAKING   Initial Assessment:   61-year-old male presenting to the emergency department for evaluation of shortness of breath and left leg pain. Differential diagnosis includes but is not limited to: Likely acute pulmonary embolism, ischemic limb, left lower extremity DVT with concern for phlegmasia cerulea dolens, ACS, anemia, electrolyte abnormality, sepsis, septic shock, AGNES, UTI  Plan:   IV, labs. 1 L IV fluid bolus. CTA chest with and without contrast.  CTA abdominal aorta with bilateral runoff.   These studies were not able to be performed at the same time per radiology due to issues with IV contrast.  We will plan to perform only the CTA chest with and without contrast due to the high suspicion for PE. Left lower extremity venous duplex ultrasound.         ED RESULTS   Laboratory results:  Labs Reviewed   CBC WITH AUTO DIFFERENTIAL - Abnormal; Notable for the following components:       Result Value    WBC 24.5 (*)     RBC 4.36 (*)     Hemoglobin 12.8 (*)     Hematocrit 38.6 (*)     RDW-CV 15.5 (*)     RDW-SD 50.4 (*)     Platelets 399 (*)     Segs Absolute 20.6 (*)     Monocytes Absolute 1.5 (*)     Immature Grans (Abs) 0.51 (*)     All other components within normal limits   COMPREHENSIVE METABOLIC PANEL W/ REFLEX TO MG FOR LOW K - Abnormal; Notable for the following components:    Glucose 132 (*)     Creatinine 2.1 (*)     BUN 41 (*)     Sodium 129 (*)     Potassium reflex Magnesium 5.6 (*)     Chloride 93 (*)     CO2 20 (*)     Total Protein 8.1 (*)     Total Bilirubin 1.3 (*)     All other components within normal limits   BRAIN NATRIURETIC PEPTIDE - Abnormal; Notable for the following components:    Pro-BNP 1422.0 (*)     All other components within normal limits   BLOOD GAS, VENOUS - Abnormal; Notable for the following components:    PH MIXED 7.46 (*)     PCO2, MIXED VENOUS 29 (*)     PO2, Mixed 62 (*)     HCO3, Mixed 21 (*)     All other components within normal limits   PROTIME-INR - Abnormal; Notable for the following components:    INR 1.26 (*)     All other components within normal limits   GLOMERULAR FILTRATION RATE, ESTIMATED - Abnormal; Notable for the following components:    Est, Glom Filt Rate 32 (*)     All other components within normal limits   OSMOLALITY - Abnormal; Notable for the following components:    Osmolality Calc 270.9 (*)     All other components within normal limits   URINE WITH REFLEXED MICRO - Abnormal; Notable for the following components:    Blood, Urine TRACE (*)     Protein, UA TRACE (*)     Color, UA DK YELLOW (*)     All other components within normal limits   PROCALCITONIN - Abnormal; Notable for the following components:    Procalcitonin No evidence of right heart strain. 2. Small amount of tree-in-bud nodularity along the left lung base    suggestive of an infectious or inflammatory process including aspiration    pneumonitis. This document has been electronically signed by: Isela Palacios MD on    09/15/2022 07:07 PM      All CTs at this facility use dose modulation techniques and iterative    reconstructions, and/or weight-based dosing   when appropriate to reduce radiation to a low as reasonably achievable. 3D Post-processing was performed on this study. XR CHEST PORTABLE   Final Result   1. No acute cardiopulmonary abnormality. This document has been electronically signed by:  Isela Palacios MD on    09/15/2022 06:33 PM      CTA ABDOMINAL AORTA W BILAT RUNOFF W WO CONTRAST    (Results Pending)   IR GUIDED THROMB [id number]    (Results Pending)   IR INTERVENTIONAL RADIOLOGY PROCEDURE REQUEST    (Results Pending)       ED Medications administered this visit:   Medications   heparin (porcine) injection 6,310 Units (has no administration in time range)   heparin (porcine) injection 3,160 Units (has no administration in time range)   heparin 25,000 units in dextrose 5% 250 mL (premix) infusion (18 Units/kg/hr × 78.9 kg IntraVENous New Bag 9/15/22 1944)   sodium chloride flush 0.9 % injection 5-40 mL (10 mLs IntraVENous Given 9/16/22 0054)   sodium chloride flush 0.9 % injection 5-40 mL (has no administration in time range)   0.9 % sodium chloride infusion (has no administration in time range)   ondansetron (ZOFRAN-ODT) disintegrating tablet 4 mg (has no administration in time range)     Or   ondansetron (ZOFRAN) injection 4 mg (has no administration in time range)   polyethylene glycol (GLYCOLAX) packet 17 g (has no administration in time range)   acetaminophen (TYLENOL) tablet 650 mg (has no administration in time range)     Or   acetaminophen (TYLENOL) suppository 650 mg (has no administration in time range)   cefTRIAXone (ROCEPHIN) 1,000 mg in dextrose 5 % 50 mL IVPB mini-bag (0 mg IntraVENous Stopped 9/16/22 0150)   docusate sodium (COLACE) capsule 100 mg (has no administration in time range)   lisinopril (PRINIVIL;ZESTRIL) tablet 20 mg ( Oral Automatically Held 9/19/22 0900)   0.9 % sodium chloride infusion ( IntraVENous New Bag 9/16/22 0054)   morphine (PF) injection 2 mg (has no administration in time range)     Or   morphine injection 4 mg (has no administration in time range)   0.9 % sodium chloride bolus (0 mLs IntraVENous Stopped 9/15/22 1854)   iopamidol (ISOVUE-370) 76 % injection 80 mL (80 mLs IntraVENous Given 9/15/22 1829)   heparin (porcine) injection 6,310 Units (6,310 Units IntraVENous Given 9/15/22 1935)   piperacillin-tazobactam (ZOSYN) 4,500 mg in dextrose 5 % 100 mL IVPB (mini-bag) (0 mg IntraVENous Stopped 9/15/22 2122)   midazolam (VERSED) injection (0.5 mg IntraVENous Given 9/15/22 2158)   fentaNYL (SUBLIMAZE) injection (25 mcg IntraVENous Given 9/15/22 2159)   midazolam (VERSED) injection (0.5 mg IntraVENous Given 9/15/22 2225)   fentaNYL (SUBLIMAZE) injection (25 mcg IntraVENous Given 9/15/22 2225)   heparin (porcine) injection (2,000 Units IntraVENous Given 9/15/22 2241)   iopamidol (ISOVUE-300) 61 % injection (70 mLs IntraVENous Given 9/15/22 2315)         ED COURSE     ED Course as of 09/16/22 0306   u Sep 15, 2022   1819 Blood Gas, Venous(!):    PH MIXED 7.46(!)   PCO2, MIXED VENOUS 29(!)   PO2, Mixed 62(!)   HCO3, Mixed 21(!)   Base Exc, Mixed -2.0   O2 Sat, Mixed 93   COLLECTED BY: [id number]  Respiratory alkalosis. [DO]   1911 CTA Chest W WO Contrast  Remarkable for multiple pulmonary emboli. We will plan to start heparin. [DO]   2103 Case discussed with Dr. Karis Coles. We will plan for left lower extremity DVT intervention due to left common femoral vein DVT and concern for phlegmasia cerulea dolens.  [DO]   2136 Case discussed with the hospitalist service who accepted the patient for admission. 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 Parts of this transcriptions may have been dictated by use of voice recognition software and electronically transcribed, and parts may have been transcribed with the assistance of an ED scribe. The transcription may contain errors not detected in proofreading. Please refer to my supervising physician's documentation if my documentation differs.     Electronically Signed: Stevan Martin DO, 09/16/22, 3:06 AM          Stevan Martin DO  Resident  09/16/22 0977

## 2022-09-16 NOTE — H&P
Formulation and discussion of sedation / procedure plans, risks, benefits, side effects and alternatives with patient and/or responsible adult completed.     Electronically signed by Sari Isbell MD on 9/15/22 at 11:25 PM EDT

## 2022-09-16 NOTE — PROGRESS NOTES
1300 Pt in specials radiology for IVC gram with possible intervention. Explained procedure to pt and pt verbalizes understanding. Consent signed. 1309 Moved to table and attached to monitor. 1310 Slip not intact in left popliteal area removed. No bleeding noted. Quick clot with 4 x 4 and op-site dressing applied. 1313 Right groin prepped and draped. 3980 Rod Amaro to speak to pt.  9884 Angioplasty of IVC with 18 mm x 40 mm Norborne balloon. 1357 Angioplasty continues. Pt tolerating well. 1410 Abre stent 20 mm x 80 mm deployed in IVC per Dr Kathy Amaro. 1413 Stent post dilated with Norborne balloon above. [address] catheter for thrombectomy. 1433 Thrombectomy of right iliac vein/IVC with Jeti catheter. Pt tolerating well.  1502 Thrombectomy of right iliac vein with Triever 16 catheter with FlowSaver. 1520 Thrombectomy of left iliac vein with Triever 16 catheter. 1529 Procedure complete. Prepping for sheath removal and slip-not insertion. Blood loss estimated 600 ml's. 1535 Sheath in right groin pulled and slip-not inserted. Quick clot with 4 x 4 and op-site dressing applied. 1539 Pt positioned on bed for comfort. 1541 Report called to [facility].  8212 Transferred to  per bed.

## 2022-09-16 NOTE — OP NOTE
Department of Radiology  Post Procedure Progress Note      Pre-Procedure Diagnosis:  stenosed IVC, bilateral dvt    Procedure Performed:  IVC stenting, angioplasty, thrombectomy     Anesthesia: local / versed and fentanyl    Findings: successful    Immediate Complications:  None    Estimated Blood Loss: minimal    SEE DICTATED PROCEDURE NOTE FOR COMPLETE DETAILS.     Julio Fisher MD   9/16/2022 3:39 PM

## 2022-09-16 NOTE — H&P
[facility]  Sedation/Analgesia History & Physical    Pt Name: Primo Dial  MRN: [id number]  YOB: 1960  Provider Performing Procedure: Radha Samano MD, MD  Primary Care Physician: WENDI Dumont    PRE-PROCEDURE   DNR-CCA/DNR-CC []Yes [x]No  Brief History/Pre-Procedure Diagnosis: stenosed IVC, bilateral  dvt          MEDICAL HISTORY  []CAD/Valve  []Liver Disease  []Lung Disease []Diabetes  []Hypertension []Renal Disease  []Additional information:       has a past medical history of Acute deep vein thrombosis (DVT) of proximal vein of both lower extremities ([facility] [address] Acute pulmonary embolism without acute cor pulmonale ([facility] [address] History of ETOH abuse, Hypertension, Pre-op evaluation: prior to abdominal hernia surgery, and Tobacco abuse. SURGICAL HISTORY   has a past surgical history that includes Umbilical hernia repair (11/22/2016); Abdominal aortic aneurysm repair; and IR GUIDED THROMB [facility] [place] [facility] (9/15/2022).   Additional information:       ALLERGIES   Allergies as of 09/15/2022 - Fully Reviewed 09/15/2022   Allergen Reaction Noted    Vancomycin Itching 06/25/2015     Additional information:       MEDICATIONS   Coumadin Use Last 5 Days [x]No []Yes  Antiplatelet drug therapy use last 5 days  [x]No []Yes  Other anticoagulant use last 5 days  []No [x]Yes    Current Facility-Administered Medications:     potassium chloride (KLOR-CON M) extended release tablet 40 mEq, 40 mEq, Oral, PRN **OR** potassium bicarb-citric acid (EFFER-K) effervescent tablet 40 mEq, 40 mEq, Oral, PRN **OR** potassium chloride 10 mEq/100 mL IVPB (Peripheral Line), 10 mEq, IntraVENous, PRN, Malachi Raphael MD    magnesium sulfate 2000 mg in 50 mL IVPB premix, 2,000 mg, IntraVENous, PRN, Malachi Raphael MD    heparin (porcine) injection 6,310 Units, 80 Units/kg, IntraVENous, PRN, Nita Amas, DO    heparin (porcine) injection 3,160 Units, 40 Units/kg, IntraVENous, PRN, Nita Amas, DO, 3,160 Units at 09/16/22 0857    heparin 25,000 units in dextrose 5% 250 mL (premix) infusion, 5-30 Units/kg/hr, IntraVENous, Continuous, Radha Salgado DO, Last Rate: 15.8 mL/hr at 09/16/22 1326, 20 Units/kg/hr at 09/16/22 1326    sodium chloride flush 0.9 % injection 5-40 mL, 5-40 mL, IntraVENous, 2 times per day, Monika Heath MD, 10 mL at 09/16/22 0054    sodium chloride flush 0.9 % injection 5-40 mL, 5-40 mL, IntraVENous, PRN, Monika Heath MD    0.9 % sodium chloride infusion, , IntraVENous, PRN, Monika Heath MD    ondansetron (ZOFRAN-ODT) disintegrating tablet 4 mg, 4 mg, Oral, Q8H PRN **OR** ondansetron (ZOFRAN) injection 4 mg, 4 mg, IntraVENous, Q6H PRN, Monika Heath MD    polyethylene glycol (GLYCOLAX) packet 17 g, 17 g, Oral, Daily PRN, Monika Heath MD    acetaminophen (TYLENOL) tablet 650 mg, 650 mg, Oral, Q6H PRN **OR** acetaminophen (TYLENOL) suppository 650 mg, 650 mg, Rectal, Q6H PRN, Monika Heath MD    cefTRIAXone (ROCEPHIN) 1,000 mg in dextrose 5 % 50 mL IVPB mini-bag, 1,000 mg, IntraVENous, Q24H, Monika Heath MD, Stopped at 09/16/22 0150    docusate sodium (COLACE) capsule 100 mg, 100 mg, Oral, BID PRN, Monika Heath MD    [Held by provider] lisinopril (PRINIVIL;ZESTRIL) tablet 20 mg, 20 mg, Oral, Daily, Monika Heath MD    0.9 % sodium chloride infusion, , IntraVENous, Continuous, Malachi Rivers MD, Last Rate: 125 mL/hr at 09/16/22 0910, New Bag at 09/16/22 0910    morphine (PF) injection 2 mg, 2 mg, IntraVENous, Q2H PRN **OR** morphine injection 4 mg, 4 mg, IntraVENous, Q2H PRN, Monika Heath MD  Prior to Admission medications    Medication Sig Start Date End Date Taking?  Authorizing Provider   docusate sodium (COLACE) 100 MG capsule Take 1 capsule by mouth 2 times daily as needed for Constipation 11/22/16   Kayode Chavez MD   lisinopril (PRINIVIL;ZESTRIL) 20 MG tablet Take 20 mg by mouth daily    Historical Provider, MD     Additional information:       VITAL SIGNS   Vitals: 09/16/22 1535   BP: 128/89   Pulse: (!) 114   Resp: 22   Temp:    SpO2: 97%       PHYSICAL:   Heart:  [x]Regular rate and rhythm  []Other:    Lungs:  [x]Clear    []Other:    Abdomen: [x]Soft    []Other:    Mental Status: [x]Alert & Oriented  []Other:      PLANNED PROCEDURE   []Biospy []Arteriogram              []Drainage   []Mediport Insertion  []Fistulogram []IV access       []Vertebroplasty / Augmentation  []IVC filter []Dialysis catheter []Biliary drainage  [x]Other:IVCGram , IVC stent insertion , thrombectomy []CAPD Catheter []Nephrostomy Tube / Stent  SEDATION  Planned agent:[x]Midazolam []Meperidine [x]Sublimaze []Dilaudid []Morphine     []Diazepam  []Other:     ASA Classification:  []1 [x]2 []3 []4 []5  Class 1: A normal healthy patient  Class 2: Pt with mild to moderate systemic disease  Class 3: Severe systemic disease or disturbance  Class 4: Severe systemic disorders that are already life threatening. Class 5: Moribund pt with little chances of survival, for more than 24 hours. Mallampati I Airway Classification:   []1 [x]2 []3 []4    [x]Pre-procedure diagnostic studies complete and results available. Comment:    [x]Previous sedation/anesthesia experiences assessed. Comment:    [x]The patient is an appropriate candidate to undergo the planned procedure sedation and anesthesia. (Refer to nursing sedation/analgesia documentation record)  [x]Formulation and discussion of sedation/procedure plan, risks, and expectations with patient and/or responsible adult completed. [x]Patient examined immediately prior to the procedure.  (Refer to nursing sedation/analgesia documentation record)    Jeanne Taveras MD, MD  Electronically signed 9/16/2022 at 3:37 PM

## 2022-09-16 NOTE — CARE COORDINATION
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 Barriers to Discharge:    B/L Pulmonary Emboli/BLE DVT  History: Alcohol Use/Smoker, AAA Repair 2 weeks ago [facility])  9/15 IVC/LLE Thromboaspiration/Thrombectomy  9/16 Left IVC Stent Placement (compressed IVC)  Creatinine 1.9, elevated WBC  IVF, Heparin gtt, IV AB  PCP: WENDI Francis  Readmission Risk Score: 12.5%  Patient's Healthcare Decision Maker: Legal Next of Kin    Patient Goals/Plan/Treatment Preferences: from [facility] (precert, will need therapy); unsure if patient will return; SW following  Transportation/Food Security/Housekeeping Addressed:  No issues identified.

## 2022-09-16 NOTE — PROGRESS NOTES
PROGRESS NOTE      Patient:  Anupam Dueñas      Unit/Bed:4K-01/001-A    YOB: 1960    MRN: [id number]       Acct: [de-identified]     PCP: WENDI De La Torre    Date of Admission: 9/15/2022      Assessment/Plan:    Anticipated Discharge in : 3-4 days    Provoked Bilateral Multilobular Pulmonary Embolism and Bilateral Proximal DVTs: Confirmed on CTA chest and VL venous duple of LE. PE is multisegmental and subsegmental, no right heart strain. BP has remained stable. Bilateral DVTs are proximal with left > right. Was hospitalized in [place] for the last 2 weeks for AAA repair. Patient placed on heparin gtt. and underwent LLE thrombectomy by Dr. Mike Guo 9/15 and then IVC stent placement 9/16.   - Continue Heparin gtt with levels q6  - Keep LLE raised with strict bedrest until 8am 9/17  - Up with assistance at 10am 9/17  - Pain control with IV Morphine PRN  - Will consult PT/OT  - Encourage deep breathing and coughing q2 while awake  - O2 NC, goal spO2 >90%  - 9/16 stable on 3 L NC @1535  - Will consult PT/OT 9/17  - CBC daily    Compression of Distal IVC: s/p IVC stenting 1/91, no complications  - Resume diet per IR  - Keep LLE raised with strict bedrest until 8am 9/17  - Up with assistance at 10am 9/17  - Will consult PT/OT 9/17    AGNES, Pre-Renal etiology, Improving: FeNa 0.3% 9/15. Recently hospitalized at [facility], s/p AAA repair, ?heavy contrast exposure? Unable to confirm via EMR. Hypokalemia and Hyponatremia POA. Unknown Cr baseline ~1.1-1.4?  - IV NS @ 125 cc/h, continue encouraging intake of PO fluids and plan weaning off fluids 9/17  - Hold home ACE inhibitor   - Daily weights, I/Os, BMP    Reactive Leukocytosis with Predominant Neutrophilia and Reactive Thrombocythemia 2/2 PE: No clear evidence or signs of infection. Afebrile. No history of recent cough. No diarrhea. Procalcitonin elevation likely due to decreased renal clearance.   - C/w empiric IV Rocephin (end 9/16)  - Daily CBC    Combined Metabolic Alkalosis with Respiratory Alkalosis: Due to AGNES and tachypnea from PE.  - Daily BMP    Mild Asymptomatic Hypovolemic Hyponatremia: Etiology likely AGNES. - Continue NS   - Daily BMP    Mild Asymptomatic Hyperkalemia 2/2 ACE-I Therapy: K 5.6 on arrival.   - Hold ACE inhibitor   - C/w NS IV hydration   - Daily BMP    Recent AAA Repair: Performed at [facility]      Chief Complaint: GUZMAN/SOB, tachypnea, leg swelling    Hospital Course: per H&P   \" Santos Ma is a 62 y.o. male with PMHx as noted below who presents to [address] [place] [address] for evaluation of worsening GUZMAN, tachypnea and lower extremity swelling. Patient recently was discharged from [facility] where he was there for 2 weeks for a AAA repair. Patient was discharged to [facility] for further rehab. Reports that he arrived there on the day prior to admission around 2 PM.  On the morning of admission patient states that he tried to walk around and noticed that after just a few feet he would get short of breath. Later in the morning he noticed that his upper left leg started getting sore. He subsequently started developing hyperventilation approximately 1 hour after. EMS was called and patient was brought to the ED for further evaluation. Patient states that he has never had a blood clot before in the past.     Summarized ED workup and finding  -- On arrival patient was found to be afebrile, tachypnea, tachycardic and hypertensive. He was placed on 3 L of O2 via NC.  -- Labs revealed a mild hyponatremia, mild hyperkalemia, prerenal AGNES, reactive leukocytosis, reactive thrombocythemia  -- EKG revealed sinus tachycardia  -- CTA chest revealed heavy burden of pulmonary emboli within the segmental and subsegmental arteries of all 5 lobes. VL venous duplex ultrasound of the lower extremities revealed a large left DVT with extension from the veins in the calf to the left common femoral vein. There is also a DVT in the right common femoral, proximal superficial femoral and peroneal veins  --Placed on heparin gtt. and immediately transferred to IR where Dr. Karis Coles performed a thrombectomy of the LLE as well as angioplasty of the IVC  -- Dr. Karis Coles spoke to this resident over telephone; patient has what appears to be a compressed IVC which was unable to be opened up via angioplasty. Recommends stenting tomorrow morning     Pertinent/Relevant History  -- Patient was recently discharged from [facility] for a AAA repair  -- He was then transferred to [facility] for rehab where on the day before admission he had the following events occur in chronologic order: proximal left leg pain, left leg swelling, GUZMAN and then tachypnea\"    9/16 - CT abd pelvis w/o contrast this AM, Bilateral retroperitoneal hematoma R>L which may relate to prior AAA, and IVC compressed distally by right retroperitoneal hematoma. ICV stent today, successful. Leukocytosis improving today, will follow, empiric Rocephin. NS at 125 ml/hr perioperatively from IR stenting, will begin weaning tomorrow with full diet. Cr improving to 1.8, Na to 131, K WNL at 4.5. Subjective: Patient has no acute complaints today, no CP, no HA, no N/V/D. His SOB has improved and he feels fine on 3 L of oxygen.        Medications:  Reviewed    Infusion Medications    heparin (PORCINE) Infusion 20 Units/kg/hr (09/16/22 1326)    sodium chloride      sodium chloride 125 mL/hr at 09/16/22 1637     Scheduled Medications    sodium chloride flush  5-40 mL IntraVENous 2 times per day    cefTRIAXone (ROCEPHIN) IV  1,000 mg IntraVENous Q24H    [Held by provider] lisinopril  20 mg Oral Daily     PRN Meds: potassium chloride **OR** potassium alternative oral replacement **OR** potassium chloride, magnesium sulfate, heparin (porcine), heparin (porcine), sodium chloride flush, sodium chloride, ondansetron **OR** ondansetron, polyethylene glycol, acetaminophen **OR** Mental Status: He is alert and oriented to person, place, and time. Motor: No weakness. Coordination: Coordination normal.   Psychiatric:         Mood and Affect: Mood normal.         Behavior: Behavior normal.         Thought Content: Thought content normal.         Judgment: Judgment normal.        Labs:   Recent Labs     09/15/22  1800 09/16/22  0153   WBC 24.5* 20.8*   HGB 12.8* 10.5*   HCT 38.6* 32.0*   * 402*     Recent Labs     09/15/22  1800 09/16/22  0153 09/16/22  0631   * 129* 131*   K 5.6* 5.0 4.5   CL 93* 95* 96*   CO2 20* 20* 22*   BUN 41* 43* 41*   CREATININE 2.1* 1.9* 1.8*   CALCIUM 9.4 8.4* 8.5     Recent Labs     09/15/22  1800 09/16/22  0153   AST 29 25   ALT 47 36   BILITOT 1.3* 1.0   ALKPHOS 123 99     Recent Labs     09/15/22  1800   INR 1.26*     No results for input(s): CKTOTAL, TROPONINI in the last 72 hours. Urinalysis:      Lab Results   Component Value Date/Time    NITRU NEGATIVE 09/15/2022 08:50 PM    WBCUA 0-2 09/15/2022 08:50 PM    BACTERIA NONE SEEN 09/15/2022 08:50 PM    RBCUA 5-10 09/15/2022 08:50 PM    BLOODU TRACE 09/15/2022 08:50 PM    GLUCOSEU NEGATIVE 09/15/2022 08:50 PM       Radiology:  CT ABDOMEN PELVIS WO CONTRAST Additional Contrast? None   Final Result   1. There is appearance of the aortobifemoral graft placement. 2. Large right retroperitoneal hematoma. Small left retroperitoneal hematoma. These are felt to relate to prior abdominal aortic aneurysm. 3. The inferior vena cava is compressed distally by the large right retroperitoneal hematoma. **This report has been created using voice recognition software. It may contain minor errors which are inherent in voice recognition technology. **      Final report electronically signed by Dr Marquis Bass on 9/16/2022 10:29 AM      IR GUIDED [facility] VEIN   Final Result   1. Extensive venous thrombosis. 2. Status post successful declot/thrombectomy procedure, as outlined above. 3. Marked narrowing of the inferior vena cava, appears related to external compression. Angioplasty of the inferior vena cava and left innominate vein was performed, unfortunately, with no significant improvement. **This report has been created using voice recognition software. It may contain minor errors which are inherent in voice recognition technology. **         Final report electronically signed by Dr. Noel Esparza on 9/16/2022 11:31 AM      VL DUP LOWER EXTREMITY VENOUS BILATERAL   Final Result   1. Large acute deep venous thrombosis extending from the left common    femoral vein through the visualized veins of the calf. 2. Acute deep venous thrombosis present within the right common femoral,    proximal superficial femoral and peroneal veins. This document has been electronically signed by: Kady Sanchez MD on    09/15/2022 08:28 PM      Technique Used: Duplex examination performed utilizing grayscale, color    and spectral analysis. CTA Chest W WO Contrast   Final Result   1. Heavy burden pulmonary emboli within the segmental and subsegmental    arteries of all 5 lobes. No evidence of right heart strain. 2. Small amount of tree-in-bud nodularity along the left lung base    suggestive of an infectious or inflammatory process including aspiration    pneumonitis. This document has been electronically signed by: Kady Sanchez MD on    09/15/2022 07:07 PM      All CTs at this facility use dose modulation techniques and iterative    reconstructions, and/or weight-based dosing   when appropriate to reduce radiation to a low as reasonably achievable. 3D Post-processing was performed on this study. XR CHEST PORTABLE   Final Result   1. No acute cardiopulmonary abnormality. This document has been electronically signed by:  Kady Sanchez MD on    09/15/2022 06:33 PM      IR TRANSCATH PLACE INTRAVASC STENT OPEN/PERC W OR WO ANGIO EACH ADDL VEIN    (Results Pending) Diet: Diet NPO Exceptions are: Sips of Water with Meds, Sips of Clear Liquids    DVT prophylaxis: [] Lovenox                                 [] SCDs                                 [x] Heparin gtt                                 [] Encourage ambulation           [] Already on Anticoagulation     Disposition:    [] Home       [] TCU       [] Rehab       [] Psych       [x] SNF       [] [place]       [] Other-    Code Status: Full Code    PT/OT Eval Status: PT/OT will consult      Electronically signed by Coretta Calero DO on 9/16/2022 at 5:18 PM

## 2022-09-16 NOTE — PROGRESS NOTES
This virtual nurse contacted bedside nurse to complete admission requirements. Seda Austin (the nurse) stated physician was at bedside and to wait until physician is done with patient, to complete requirements.

## 2022-09-16 NOTE — H&P
ondansetron (ZOFRAN) injection 4 mg, 4 mg, IntraVENous, Q6H PRN, Marti Dawkins MD    polyethylene glycol (GLYCOLAX) packet 17 g, 17 g, Oral, Daily PRN, Marti Dawkins MD    acetaminophen (TYLENOL) tablet 650 mg, 650 mg, Oral, Q6H PRN **OR** acetaminophen (TYLENOL) suppository 650 mg, 650 mg, Rectal, Q6H PRN, Malachi Shen MD    cefTRIAXone (ROCEPHIN) 1,000 mg in dextrose 5 % 50 mL IVPB mini-bag, 1,000 mg, IntraVENous, Q24H, Malachi Shen MD    ketorolac (TORADOL) injection 15 mg, 15 mg, IntraVENous, Q6H PRN, Malachi Frank MD    0.9 % sodium chloride infusion, , IntraVENous, Continuous, Malachi Shen MD    Current Outpatient Medications:     docusate sodium (COLACE) 100 MG capsule, Take 1 capsule by mouth 2 times daily as needed for Constipation, Disp: 30 capsule, Rfl: 1    lisinopril (PRINIVIL;ZESTRIL) 20 MG tablet, Take 20 mg by mouth daily, Disp: , Rfl:   Prior to Admission medications    Medication Sig Start Date End Date Taking?  Authorizing Provider   docusate sodium (COLACE) 100 MG capsule Take 1 capsule by mouth 2 times daily as needed for Constipation 11/22/16   Deena Szymanski MD   lisinopril (PRINIVIL;ZESTRIL) 20 MG tablet Take 20 mg by mouth daily    Historical Provider, MD     Additional information:       VITAL SIGNS   Vitals:    09/15/22 2320   BP: (!) 147/94   Pulse: (!) 121   Resp: 24   Temp:    SpO2: 100%       PHYSICAL:   Heart:  [x]Regular rate and rhythm  []Other:    Lungs:  [x]Clear    []Other:    Abdomen: [x]Soft    []Other:    Mental Status: [x]Alert & Oriented  []Other:      PLANNED PROCEDURE   []Biospy []Arteriogram              []Drainage   []Mediport Insertion  []Fistulogram []IV access       []Vertebroplasty / Augmentation  []IVC filter []Dialysis catheter []Biliary drainage  [x]Other:venous thrombectomy Left leg and IVC  []CAPD Catheter []Nephrostomy Tube / Stent  SEDATION  Planned agent:[x]Midazolam []Meperidine [x]Sublimaze []Dilaudid []Morphine     []Diazepam []Other:     ASA Classification:  []1 [x]2 []3 []4 []5  Class 1: A normal healthy patient  Class 2: Pt with mild to moderate systemic disease  Class 3: Severe systemic disease or disturbance  Class 4: Severe systemic disorders that are already life threatening. Class 5: Moribund pt with little chances of survival, for more than 24 hours. Mallampati I Airway Classification:   []1 [x]2 []3 []4    [x]Pre-procedure diagnostic studies complete and results available. Comment:    [x]Previous sedation/anesthesia experiences assessed. Comment:    [x]The patient is an appropriate candidate to undergo the planned procedure sedation and anesthesia. (Refer to nursing sedation/analgesia documentation record)  [x]Formulation and discussion of sedation/procedure plan, risks, and expectations with patient and/or responsible adult completed. [x]Patient examined immediately prior to the procedure.  (Refer to nursing sedation/analgesia documentation record)    Romaine Medina MD, MD  Electronically signed 9/15/2022 at 11:26 PM

## 2022-09-16 NOTE — FLOWSHEET NOTE
09/16/22 1426   Safe Environment   Safety Measures Other (comment)  (Virtual Safety Round Complete)   Virtual RN rounds, pt out of room at this time

## 2022-09-16 NOTE — H&P
Formulation and discussion of sedation / procedure plans, risks, benefits, side effects and alternatives with patient and/or responsible adult completed.     Electronically signed by Steve Quevedo MD on 9/16/22 at 3:37 PM EDT

## 2022-09-16 NOTE — H&P
Hospitalist - History and Physical    Patient: Nallely Vegas  : 1960  MRN: [id number]     Acct: [de-identified]  PCP: WENDI Pollock  Date of Admission: 9/15/2022  Date of Service: Pt seen/examined on 09/15/22    Assessment and Plan:  Provoked Bilateral Multilobular Pulmonary Embolism and Bilateral Proximal DVTs: Confirmed on CTA chest and VL venous duple of LE. PE is multisegmental and subsegmental, no right heart strain. No hypotension. Bilateral DVTs are proximal with left > right. Was hospitalized in [place] for the last 2 weeks for AAA repair. Patient placed on heparin gtt. and underwent LLE thrombectomy by Dr. Kathy Amaro. Dr. Kathy Amaro discussed findings with me stating findings of a compressed IVC that failed angioplasty. Recommended stent placement in the morning. Continue Heparin gtt   Keep LLE raised   Pain control with IV Morphine PRN  Compression of Distal IVC: As seen and reported by Dr. Kathy Amaro. Failed angioplasty attempt. 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 BP currently running reasonable. Order placed for IR IVC stenting to be done on 2022 in the AM  Keep NPO for now   Keep LLE raised   Non-Oliguric Pre-Renal Acute Kidney Injury: FeNa 0.3%, indicating prerenal etiology. Of note, patient was hosptilized at [facility] where he underwent a AAA repair - he likely underwent heavy contrast exposure. Hyperkalemia and hyponatremia present. No history of CKD. Will hydrate with IV NS @ 125 cc/h. Continue encouraging intake of PO fluids   Patient to receive further contrast for planned procedures - monitor renal function daily   Hold home ACE inhibitor   Daily weights, I/Os, electrolyte monitoring   Reactive Leukocytosis with Predominant Neutrophilia and Reactive Thrombocythemia 2/2 PE: No clear evidence or signs of infection. Afebrile. No history of recent cough. No diarrhea.  Procalcitonin elevation likely due to decreased renal clearance. Will empirically treat with IV Rocephin for now given suspected findings of aspiration pneumonia   Trend daily with CBC w/ diff  Combined Metabolic Alkalosis with Respiratory Alkalosis: Due to AGNES and tachypnea from PE.   Mild Asymptomatic Hypovolemic Hyponatremia: Etiology likely due to temporary inability to excrete free water due to AGNES. Na 129, no prior baseline. Continue hydration with IV NS. Trend daily. Mild Asymptomatic Hyperkalemia 2/2 ACE-I Therapy: K 5.6 on arrival. Hold ACE inhibitor. Continue IV hydration as above. Will repeat K. Recent AAA Repair: Performed at [facility].    =======================================================================    Chief Complaint:  GUZMAN, tachypnea, leg swelling    History Of Present Illness:  Shahid Montanez is a 62 y.o. male with PMHx as noted below who presents to [address] [place] [address] for evaluation of worsening GUZMAN, tachypnea and lower extremity swelling. Patient recently was discharged from [facility] where he was there for 2 weeks for a AAA repair. Patient was discharged to [facility] for further rehab. Reports that he arrived there on the day prior to admission around 2 PM.  On the morning of admission patient states that he tried to walk around and noticed that after just a few feet he would get short of breath. Later in the morning he noticed that his upper left leg started getting sore. He subsequently started developing hyperventilation approximately 1 hour after. EMS was called and patient was brought to the ED for further evaluation. Patient states that he has never had a blood clot before in the past.    Summarized ED workup and finding  -- On arrival patient was found to be afebrile, tachypnea, tachycardic and hypertensive.   He was placed on 3 L of O2 via NC.  -- Labs revealed a mild hyponatremia, mild hyperkalemia, prerenal AGNES, reactive leukocytosis, reactive thrombocythemia  -- EKG revealed sinus tachycardia  -- CTA chest revealed heavy burden of pulmonary emboli within the segmental and subsegmental arteries of all 5 lobes. VL venous duplex ultrasound of the lower extremities revealed a large left DVT with extension from the veins in the calf to the left common femoral vein. There is also a DVT in the right common femoral, proximal superficial femoral and peroneal veins  --Placed on heparin gtt. and immediately transferred to IR where Dr. Marcia Rose performed a thrombectomy of the LLE as well as angioplasty of the IVC  -- Dr. Marcia Rose spoke to this resident over telephone; patient has what appears to be a compressed IVC which was unable to be opened up via angioplasty. Recommends stenting tomorrow morning    Pertinent/Relevant History  -- Patient was recently discharged from [facility] for a AAA repair  -- He was then transferred to [facility] for rehab where on the day before admission he had the following events occur in chronologic order: proximal left leg pain, left leg swelling, GUZMAN and then tachypnea    Past Medical History:        Diagnosis Date    History of ETOH abuse 10/19/2016    Hypertension     Pre-op evaluation: prior to abdominal hernia surgery 10/19/2016    Tobacco abuse 10/19/2016     Past Surgical History:        Procedure Laterality Date    ABDOMINAL AORTIC ANEURYSM REPAIR      UMBILICAL HERNIA REPAIR  11/22/2016    Mesh     Medications Prior to Admission:   Prior to Admission medications    Medication Sig Start Date End Date Taking? Authorizing Provider   docusate sodium (COLACE) 100 MG capsule Take 1 capsule by mouth 2 times daily as needed for Constipation 11/22/16   Daryl Pritchard MD   lisinopril (PRINIVIL;ZESTRIL) 20 MG tablet Take 20 mg by mouth daily    Historical Provider, MD     Allergies:  Vancomycin    Social History:    Tobacco use:   reports that he has been smoking cigarettes. He has been smoking an average of .5 packs per day.  He has never used smokeless tobacco.  Alcohol use:   reports current alcohol use. Drug use:  reports no history of drug use. Family History:      Problem Relation Age of Onset    Cancer Mother         lung     Review of Systems:   Pertinent positives and negatives as noted in the HPI. Otherwise complete ROS negative. Physical Exam:  Vitals:    09/15/22 1815 09/15/22 1834 09/15/22 1929 09/15/22 2031   BP:  (!) 150/89 (!) 131/91 (!) 127/96   Pulse: (!) 114 (!) 114 (!) 113 (!) 120   Resp: 30 (!) 32 28 (!) 36   Temp:       TempSrc:       SpO2: 98% 94% 98% 96%   Weight:       Height:          General: No acute distress. Non ill appearing. Appears documented age. HEENT:  normocephalic and atraumatic. Neck: supple. No Thyromegaly. Lungs: clear to auscultation. Cardiac: tachycardic rate, regular rhythm. No audible murmurs. No JVD. Abdomen: soft. Nontender. Bowel sounds present  Extremities: Left popliteal region has dressings where tremor aspiration site was accessed. LLE is notably more swollen than the RLE. Some tenderness to palpation in the LLE. Skin: warm and dry. Psych:  Alert and oriented x3. Affect appropriate. Appropriate insight  Neurologic:  No focal deficit. No seizure/postictal state. Labs:     Recent Labs     09/15/22  1800   WBC 24.5*   HGB 12.8*   HCT 38.6*   *     Recent Labs     09/15/22  1800   *   K 5.6*   CL 93*   CO2 20*   BUN 41*   CREATININE 2.1*   CALCIUM 9.4     Recent Labs     09/15/22  1800   AST 29   ALT 47   BILITOT 1.3*   ALKPHOS 123     Recent Labs     09/15/22  1800   INR 1.26*     No results for input(s): CKTOTAL, TROPONINI in the last 72 hours. Lab Results   Component Value Date/Time    NITRU NEGATIVE 09/15/2022 08:50 PM    BLOODU TRACE 09/15/2022 08:50 PM    GLUCOSEU NEGATIVE 09/15/2022 08:50 PM     Radiology:     VL DUP LOWER EXTREMITY VENOUS BILATERAL   Final Result   1.  Large acute deep venous thrombosis extending from the left common    femoral vein through the visualized veins of the calf. 2. Acute deep venous thrombosis present within the right common femoral,    proximal superficial femoral and peroneal veins. This document has been electronically signed by: Corina Morris MD on    09/15/2022 08:28 PM      Technique Used: Duplex examination performed utilizing grayscale, color    and spectral analysis. CTA Chest W WO Contrast   Final Result   1. Heavy burden pulmonary emboli within the segmental and subsegmental    arteries of all 5 lobes. No evidence of right heart strain. 2. Small amount of tree-in-bud nodularity along the left lung base    suggestive of an infectious or inflammatory process including aspiration    pneumonitis. This document has been electronically signed by: Corina Morris MD on    09/15/2022 07:07 PM      All CTs at this facility use dose modulation techniques and iterative    reconstructions, and/or weight-based dosing   when appropriate to reduce radiation to a low as reasonably achievable. 3D Post-processing was performed on this study. XR CHEST PORTABLE   Final Result   1. No acute cardiopulmonary abnormality. This document has been electronically signed by: Corina Morris MD on    09/15/2022 06:33 PM      CTA ABDOMINAL AORTA W BILAT RUNOFF W WO CONTRAST    (Results Pending)   IR GUIDED THROMB [id number]    (Results Pending)     Thank you WENDI Belle for the opportunity to be involved in this patient's care.     Electronically signed by Sanaz Burns MD on 9/15/2022 at 9:25 PM.   Internal Medicine Resident PGY-3

## 2022-09-16 NOTE — PROGRESS NOTES
2146 Pt in specials radiology for left leg thrombectomy. Explained procedure to pt and pt verbalizes understanding. Consent signed. 2147 Moved to table and attached to monitor in prone position. Left leg swollen and purplish in color. 2151 Dr Joanie Wright to speak to pt.  Jorden Kenny Left popliteal area prepped and draped. 2216 Thrombectomy of left leg with Tre[place] 20 catheter. Using Flow saver device. 2241 Heparin verified with A Emma RT.  2257 Angioplasty of IVC with 12 x 40 [place] 35 balloon. 2310 Procedure complete. Prepping for sheath removal with slip-not. 2314 Sheath removed as slip-not inserted. Quick clot applied with manual pressure. No bleeding noted. 2223 4 x 4's with op-site applied. No bleeding noted. 1 Pt positioned on bed for comfort. Report to RN and transferred to 4K per bed. Dr Joanie Wright spoke to resident.

## 2022-09-16 NOTE — ED NOTES
Pt resting on cot at this time, VSS. Zosyn completed at this time.  Pt denies needs at this time, will continue to monitor      Cris Reyes RN  09/15/22 2123

## 2022-09-16 NOTE — PROGRESS NOTES
This virtual nurse, completed admission requirements. Patient verbalized understanding to use call light for safety purposes.

## 2022-09-16 NOTE — ED NOTES
ED to inpatient nurses report    Chief Complaint   Patient presents with    Shortness of Breath     Sudden onset while at rehab after AAA repair in July      Present to ED from nursing home  LOC: alert and orientated to name, place, date  Vital signs   Vitals:    09/15/22 2210 09/15/22 2215 09/15/22 2220 09/15/22 2225   BP: (!) 131/97 (!) 131/109 116/88 116/88   Pulse: (!) 114 (!) 111 (!) 123 (!) 121   Resp: 25 23 26 21   Temp:       TempSrc:       SpO2: 98% 98% 98% 98%   Weight:       Height:          Oxygen Baseline RA    Current needs required 3L NC  LDAs:   Peripheral IV 09/15/22 Right Antecubital (Active)   Site Assessment Clean, dry & intact 09/15/22 2033   Line Status Infusing 09/15/22 2033   Phlebitis Assessment No symptoms 09/15/22 2033   Infiltration Assessment 0 09/15/22 2033   Dressing Status Clean, dry & intact 09/15/22 2033       Peripheral IV 09/15/22 Right Forearm (Active)   Site Assessment Clean, dry & intact 09/15/22 2033   Line Status Infusing 09/15/22 2033   Phlebitis Assessment No symptoms 09/15/22 2033   Infiltration Assessment 0 09/15/22 2033   Dressing Status Clean, dry & intact 09/15/22 2033     Mobility: Requires assistance * 2  Pending ED orders: NA  Present condition: Pt resting on cot comfortably.  Pt denies a need for anything    C-SSRS Risk of Suicide: No Risk  Swallow Screening    Preferred Language: English     Electronically signed by James Islas RN on 9/15/2022 at 10:28 PM       James Islas RN  09/15/22 2229

## 2022-09-16 NOTE — OP NOTE
Department of Radiology  Post Procedure Progress Note      Pre-Procedure Diagnosis:  phlegmasia left leg     Procedure Performed:  thromboaspiration left leg veins, iliac veins and IVC, angioplasty IVC     Anesthesia: local / versed and fentanyl    Findings: successful thrombospiration, but marked compression of IVC     Immediate Complications:  None    Estimated Blood Loss: minimal    SEE DICTATED PROCEDURE NOTE FOR COMPLETE DETAILS.     Natali Renteria MD   9/15/2022 11:28 PM

## 2022-09-16 NOTE — CARE COORDINATION
9/16/22, 8:26 AM EDT  Discharge Planning Evaluation  Social work consult received, patient from [facility]. Patient/Family is unsure if they want to return to [facility]. Patient reported that he understands that he may not be able to return home from hospital. SW explained that if he would want another ECF SW would need to start making referrals. He asked SW to call his spouse. The patient's current payor source at the facility is Stefan Patel. Medicare skilled days available: No  Insurance precert:  Yes  Spoke with Pat at [facility] and she will let SW know if they can accept back. Patient bed hold: No  Anticipated transport plan: Ambulance   Do they require COVID 19 test to return to ECF: Yes  Is there a required time frame which which COVID test needs done: Yes  Patient's Healthcare Decision Maker: Legal Next of Mar Jack    Patient reported that he was only at [facility] for 24 hours. He reported that he felt they did not pay any attention to him or help him when he asked for help. He reported that his wife Adriana Hoffmann inquired about [facility]. He asked SW to call [facility] [place] and see what she thinks. SW called and left voicemail for spouse Adriana [place]. 9/16/22, 8:53 AM EDT  DISCHARGE PLANNING EVALUATION    SW received a call from spouse Adriana Hoffmann and she reported that she would like a referral sent to [facility]. VÍCTOR called Elaine Mcmahan at [facility] and made referral. He will check benefits and let SW know if they can medically accept.

## 2022-09-17 ENCOUNTER — APPOINTMENT (OUTPATIENT)
Dept: CT IMAGING | Age: 62
DRG: 252 | End: 2022-09-17
Payer: COMMERCIAL

## 2022-09-17 PROBLEM — E83.51 HYPOCALCEMIA: Status: ACTIVE | Noted: 2022-09-17

## 2022-09-17 PROBLEM — I51.9 LV DYSFUNCTION: Status: ACTIVE | Noted: 2022-09-17

## 2022-09-17 PROBLEM — D64.9 ACUTE ANEMIA: Status: ACTIVE | Noted: 2022-09-17

## 2022-09-17 PROBLEM — I51.89 DIASTOLIC DYSFUNCTION: Status: ACTIVE | Noted: 2022-09-17

## 2022-09-17 LAB
ABO: NORMAL
ANION GAP SERPL CALCULATED.3IONS-SCNC: 10 MEQ/L (ref 8–16)
ANTIBODY SCREEN: NORMAL
BASOPHILS # BLD: 0.8 %
BASOPHILS ABSOLUTE: 0.1 THOU/MM3 (ref 0–0.1)
BUN BLDV-MCNC: 32 MG/DL (ref 7–22)
CALCIUM SERPL-MCNC: 7.6 MG/DL (ref 8.5–10.5)
CHLORIDE BLD-SCNC: 100 MEQ/L (ref 98–111)
CO2: 22 MEQ/L (ref 23–33)
CREAT SERPL-MCNC: 1.3 MG/DL (ref 0.4–1.2)
EOSINOPHIL # BLD: 0.3 %
EOSINOPHILS ABSOLUTE: 0 THOU/MM3 (ref 0–0.4)
ERYTHROCYTE [DISTWIDTH] IN BLOOD BY AUTOMATED COUNT: 15.8 % (ref 11.5–14.5)
ERYTHROCYTE [DISTWIDTH] IN BLOOD BY AUTOMATED COUNT: 51.6 FL (ref 35–45)
GFR SERPL CREATININE-BSD FRML MDRD: 56 ML/MIN/1.73M2
GLUCOSE BLD-MCNC: 118 MG/DL (ref 70–108)
HCT VFR BLD CALC: 22.3 % (ref 42–52)
HCT VFR BLD CALC: 22.8 % (ref 42–52)
HEMOGLOBIN: 7.2 GM/DL (ref 14–18)
HEMOGLOBIN: 7.4 GM/DL (ref 14–18)
HEPARIN UNFRACTIONATED: 0.2 U/ML (ref 0.3–0.7)
HEPARIN UNFRACTIONATED: 0.21 U/ML (ref 0.3–0.7)
HEPARIN UNFRACTIONATED: 0.24 U/ML (ref 0.3–0.7)
IMMATURE GRANS (ABS): 0.35 THOU/MM3 (ref 0–0.07)
IMMATURE GRANULOCYTES: 2.2 %
LYMPHOCYTES # BLD: 12.3 %
LYMPHOCYTES ABSOLUTE: 1.9 THOU/MM3 (ref 1–4.8)
MAGNESIUM: 2.2 MG/DL (ref 1.6–2.4)
MCH RBC QN AUTO: 29.4 PG (ref 26–33)
MCHC RBC AUTO-ENTMCNC: 32.5 GM/DL (ref 32.2–35.5)
MCV RBC AUTO: 90.5 FL (ref 80–94)
MONOCYTES # BLD: 9.5 %
MONOCYTES ABSOLUTE: 1.5 THOU/MM3 (ref 0.4–1.3)
NUCLEATED RED BLOOD CELLS: 0 /100 WBC
PLATELET # BLD: 303 THOU/MM3 (ref 130–400)
PMV BLD AUTO: 11 FL (ref 9.4–12.4)
POTASSIUM REFLEX MAGNESIUM: 3.5 MEQ/L (ref 3.5–5.2)
RBC # BLD: 2.52 MILL/MM3 (ref 4.7–6.1)
RH FACTOR: NORMAL
SEG NEUTROPHILS: 74.9 %
SEGMENTED NEUTROPHILS ABSOLUTE COUNT: 11.8 THOU/MM3 (ref 1.8–7.7)
SODIUM BLD-SCNC: 132 MEQ/L (ref 135–145)
WBC # BLD: 15.7 THOU/MM3 (ref 4.8–10.8)

## 2022-09-17 PROCEDURE — 86901 BLOOD TYPING SEROLOGIC RH(D): CPT

## 2022-09-17 PROCEDURE — 85018 HEMOGLOBIN: CPT

## 2022-09-17 PROCEDURE — P9016 RBC LEUKOCYTES REDUCED: HCPCS

## 2022-09-17 PROCEDURE — 99233 SBSQ HOSP IP/OBS HIGH 50: CPT | Performed by: FAMILY MEDICINE

## 2022-09-17 PROCEDURE — 6360000002 HC RX W HCPCS: Performed by: STUDENT IN AN ORGANIZED HEALTH CARE EDUCATION/TRAINING PROGRAM

## 2022-09-17 PROCEDURE — 2060000000 HC ICU INTERMEDIATE R&B

## 2022-09-17 PROCEDURE — 86900 BLOOD TYPING SEROLOGIC ABO: CPT

## 2022-09-17 PROCEDURE — 74174 CTA ABD&PLVS W/CONTRAST: CPT

## 2022-09-17 PROCEDURE — 85014 HEMATOCRIT: CPT

## 2022-09-17 PROCEDURE — 36415 COLL VENOUS BLD VENIPUNCTURE: CPT

## 2022-09-17 PROCEDURE — 6360000004 HC RX CONTRAST MEDICATION: Performed by: THORACIC SURGERY (CARDIOTHORACIC VASCULAR SURGERY)

## 2022-09-17 PROCEDURE — 2580000003 HC RX 258: Performed by: STUDENT IN AN ORGANIZED HEALTH CARE EDUCATION/TRAINING PROGRAM

## 2022-09-17 PROCEDURE — 86923 COMPATIBILITY TEST ELECTRIC: CPT

## 2022-09-17 PROCEDURE — 6370000000 HC RX 637 (ALT 250 FOR IP): Performed by: STUDENT IN AN ORGANIZED HEALTH CARE EDUCATION/TRAINING PROGRAM

## 2022-09-17 PROCEDURE — 83735 ASSAY OF MAGNESIUM: CPT

## 2022-09-17 PROCEDURE — 80048 BASIC METABOLIC PNL TOTAL CA: CPT

## 2022-09-17 PROCEDURE — 86850 RBC ANTIBODY SCREEN: CPT

## 2022-09-17 PROCEDURE — 36430 TRANSFUSION BLD/BLD COMPNT: CPT

## 2022-09-17 PROCEDURE — 85025 COMPLETE CBC W/AUTO DIFF WBC: CPT

## 2022-09-17 PROCEDURE — 85520 HEPARIN ASSAY: CPT

## 2022-09-17 RX ORDER — SODIUM CHLORIDE 9 MG/ML
INJECTION, SOLUTION INTRAVENOUS PRN
Status: DISCONTINUED | OUTPATIENT
Start: 2022-09-17 | End: 2022-09-22 | Stop reason: HOSPADM

## 2022-09-17 RX ADMIN — HEPARIN SODIUM AND DEXTROSE 24 UNITS/KG/HR: 10000; 5 INJECTION INTRAVENOUS at 03:31

## 2022-09-17 RX ADMIN — HEPARIN SODIUM 3160 UNITS: 1000 INJECTION, SOLUTION INTRAVENOUS; SUBCUTANEOUS at 00:08

## 2022-09-17 RX ADMIN — IOPAMIDOL 80 ML: 755 INJECTION, SOLUTION INTRAVENOUS at 14:42

## 2022-09-17 RX ADMIN — HEPARIN SODIUM 3160 UNITS: 1000 INJECTION, SOLUTION INTRAVENOUS; SUBCUTANEOUS at 11:11

## 2022-09-17 RX ADMIN — CEFTRIAXONE SODIUM 1000 MG: 1 INJECTION, POWDER, FOR SOLUTION INTRAMUSCULAR; INTRAVENOUS at 23:29

## 2022-09-17 RX ADMIN — ACETAMINOPHEN 650 MG: 325 TABLET ORAL at 21:08

## 2022-09-17 RX ADMIN — SODIUM CHLORIDE: 9 INJECTION, SOLUTION INTRAVENOUS at 10:04

## 2022-09-17 ASSESSMENT — PAIN SCALES - GENERAL
PAINLEVEL_OUTOF10: 0
PAINLEVEL_OUTOF10: 0

## 2022-09-17 NOTE — FLOWSHEET NOTE
09/17/22 1001   Safe Environment   Safety Measures   (Virtual RN rounds complete)       PT sitting upright in bed, conversing with staff in room. Denies needs at this time. No acute distress noted. Lulú Olivares

## 2022-09-17 NOTE — PLAN OF CARE
Problem: Discharge Planning  Goal: Discharge to home or other facility with appropriate resources  9/17/2022 1419 by Ariadna Calhoun RN  Outcome: Progressing  Flowsheets (Taken 9/17/2022 0800)  Discharge to home or other facility with appropriate resources:   Identify barriers to discharge with patient and caregiver   Arrange for needed discharge resources and transportation as appropriate   Identify discharge learning needs (meds, wound care, etc)   Refer to discharge planning if patient needs post-hospital services based on physician order or complex needs related to functional status, cognitive ability or social support system  9/17/2022 0435 by Manjula Lujan RN  Outcome: Progressing     Problem: Pain  Goal: Verbalizes/displays adequate comfort level or baseline comfort level  9/17/2022 1419 by Ariadna Calhoun RN  Outcome: Progressing  9/17/2022 0435 by Manjula Lujan RN  Outcome: Progressing     Problem: Safety - Adult  Goal: Free from fall injury  9/17/2022 1419 by Ariadna Calhoun RN  Outcome: Progressing  9/17/2022 0435 by Manjula Lujan RN  Outcome: Progressing     Problem: Chronic Conditions and Co-morbidities  Goal: Patient's chronic conditions and co-morbidity symptoms are monitored and maintained or improved  9/17/2022 1419 by Ariadna Calhoun RN  Outcome: Progressing  Flowsheets (Taken 9/17/2022 0800)  Care Plan - Patient's Chronic Conditions and Co-Morbidity Symptoms are Monitored and Maintained or Improved:   Monitor and assess patient's chronic conditions and comorbid symptoms for stability, deterioration, or improvement   Collaborate with multidisciplinary team to address chronic and comorbid conditions and prevent exacerbation or deterioration   Update acute care plan with appropriate goals if chronic or comorbid symptoms are exacerbated and prevent overall improvement and discharge  9/17/2022 0435 by Manjula Lujan RN  Outcome: Progressing     Care plan reviewed with patient and wife. Patient and wife verbalize understanding of the plan of care and contribute to goal setting.

## 2022-09-17 NOTE — PROGRESS NOTES
PROGRESS NOTE      Patient:  Anupam Dueñas      Unit/Bed:4K-01/001-A    YOB: 1960    MRN: [id number]       Acct: [de-identified]     PCP: WENDI De La Torre    Date of Admission: 9/15/2022      Assessment/Plan:    Anticipated Discharge in : 3 days    Provoked Bilateral Multilobular Pulmonary Embolism and Bilateral Proximal DVTs: Confirmed on CTA chest and VL venous duple of LE. PE is multisegmental and subsegmental, no right heart strain. BP has remained stable. Bilateral DVTs are proximal with left > right. Was hospitalized in [place] for the last 2 weeks for AAA repair. Small amount of tree-in-bud nodularity along the left lung base suspicious for aspiration pneumonia vs PE inflammation. Patient placed on heparin gtt. and underwent LLE thrombectomy by Dr. Mike Guo 9/15 and then IVC stent placement 9/16.   - Continue Heparin gtt with levels q6  - Keep LLE raised while in bed  - 9/17 R groin slip knot removed by IR  - Up with assistance  - Pain control with IV Morphine PRN  - Consulted PT/OT for AM 9/18  - Encourage deep breathing and coughing q2 while awake  - O2 NC, goal spO2 >90%  - 9/17 stable on 3 L NC @1719  - H&H tending q6  - CBC daily    Anemia, s/p IVC stenting and AAA repair: Drop in Hgb from 12.8 on 9/15 to 10.5 on 9/16 to 7.4-7.2 9/17. CT findings have not identified an acute bleed 9/16 nor 9/17, however the patient has bilateral retroperitoneal hematomas. - Consulted Thoracic surgery  - Transfusing 1 unit PRBC per recs  - Serial H&H trending q6  - Daily CBC, monitor on telemetry  - Transfuse <7, consent on file    LV dysfunction with diastolic dysfunction, possibly due to PE: Given extensive clot history, this may be an acute development, however chronic cardiac dysfunction cannot be excluded entirely.  ECHO from 9/16 with moderatedly reduced systolic function and EF of 40% as well as grade 1 diastolic dysfunction.  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 Recently hospitalized at [facility], s/p AAA repair, ?heavy contrast exposure? Unable to confirm via EMR. Hypokalemia and Hyponatremia POA. Unknown Cr baseline ~1.1-1.4?  - Reduced IV NS @ 50 cc/h, continue encouraging PO hydration  - 9/17 got contrast CTA  - 9/17 Cr improved to 1.3  - Hold home ACE inhibitor   - Daily weights, I/Os, BMP    Reactive Leukocytosis with Predominant Neutrophilia and Reactive Thrombocythemia 2/2 PE: No clear evidence or signs of infection however small amount of tree-in-bud nodularity along the left lung base suspicious for aspiration pneumonia vs PE inflammation. Afebrile. No history of recent cough. No diarrhea. Procalcitonin elevation likely due to decreased renal clearance. - C/w empiric IV Rocephin (end 9/19)  - Daily CBC    Combined Metabolic Alkalosis with Respiratory Alkalosis, improved: Due to AGNES and tachypnea from PE.  - Daily BMP    Mild Asymptomatic Hyponatremia, stable: Etiology likely AGNES. - Reduced NS   - Daily BMP    Mild Asymptomatic Hyperkalemia 2/2 ACE-I Therapy: K 5.6 on arrival.   - Hold ACE inhibitor   - Reduced NS IV hydration   - Daily BMP    Recent AAA Repair: Performed at [facility] thoracic surgery      Chief Complaint: GUZMAN/SOB, tachypnea, leg swelling    Hospital Course: per H&P   \" Primo Dial is a 62 y.o. male with PMHx as noted below who presents to [address] [place] [address] for evaluation of worsening GUZMAN, tachypnea and lower extremity swelling. Patient recently was discharged from [place] where he was there for 2 weeks for a AAA repair. Patient was discharged to [facility] for further rehab.   Reports that he arrived there on the day prior to admission around 2 PM.  On the morning of admission patient states that he tried to walk around and noticed that after just a few feet he would get short of breath. Later in the morning he noticed that his upper left leg started getting sore. He subsequently started developing hyperventilation approximately 1 hour after. EMS was called and patient was brought to the ED for further evaluation. Patient states that he has never had a blood clot before in the past.     Summarized ED workup and finding  -- On arrival patient was found to be afebrile, tachypnea, tachycardic and hypertensive. He was placed on 3 L of O2 via NC.  -- Labs revealed a mild hyponatremia, mild hyperkalemia, prerenal AGNES, reactive leukocytosis, reactive thrombocythemia  -- EKG revealed sinus tachycardia  -- CTA chest revealed heavy burden of pulmonary emboli within the segmental and subsegmental arteries of all 5 lobes. VL venous duplex ultrasound of the lower extremities revealed a large left DVT with extension from the veins in the calf to the left common femoral vein. There is also a DVT in the right common femoral, proximal superficial femoral and peroneal veins  --Placed on heparin gtt. and immediately transferred to IR where Dr. Joanie Wright performed a thrombectomy of the LLE as well as angioplasty of the IVC  -- Dr. Joanie Wright spoke to this resident over telephone; patient has what appears to be a compressed IVC which was unable to be opened up via angioplasty.   Recommends stenting tomorrow morning     Pertinent/Relevant History  -- Patient was recently discharged from [facility] for a AAA repair  -- He was then transferred to [facility] for rehab where on the day before admission he had the following events occur in chronologic order: proximal left leg pain, left leg swelling, GUZMAN and then tachypnea\"    9/16 - CT abd pelvis w/o contrast this AM, Bilateral retroperitoneal hematoma R>L which may relate to prior AAA, and IVC compressed distally by right retroperitoneal hematoma. ICV stent today, successful. Leukocytosis improving today, will follow, empiric Rocephin. NS at 125 ml/hr perioperatively from IR stenting, will begin weaning tomorrow with full diet. Cr improving to 1.8, Na to 131, K WNL at 4.5.    9/17 - Hemoglobin dropped this AM from 10.5 to 7.4, repeat 7.2. Consulted Vascular surgery. Patient was consented for blood products and transfused, H&H trends ordered. Abd CTA w/wo contrast taken, revealed stable post-surgical changes compared to CT 9/16. Will will continue the heparin gtt despite drop in Hgb of unclear etiology given his extensive clotting with bilateral PE and bilateral DVTs. IR took out R groin slip knot, PT/OT consulted for tomorrow morning. Cr improving to 1.3, Na improving to 132, K WNL at 3.5. Patient feels fair overall. Rechecked pt ~1719 in person, patient continues to endorse no acute complaints, wife present, all questions answered. Subjective: Patient has no acute complaints today, neither in the AM nor PM. Denies CP, no HA, no N/V/D (CTA 9/17 warns of impending diarrhea). His SOB has improved and he feels fine on 3 L of oxygen.        Medications:  Reviewed    Infusion Medications    sodium chloride      sodium chloride      sodium chloride 50 mL/hr at 09/17/22 1105     Scheduled Medications    sodium chloride flush  5-40 mL IntraVENous 2 times per day    cefTRIAXone (ROCEPHIN) IV  1,000 mg IntraVENous Q24H    [Held by provider] lisinopril  20 mg Oral Daily     PRN Meds: sodium chloride, potassium chloride **OR** potassium alternative oral replacement **OR** potassium chloride, magnesium sulfate, sodium chloride flush, sodium chloride, ondansetron **OR** ondansetron, polyethylene glycol, acetaminophen **OR** acetaminophen, docusate sodium, morphine **OR** morphine      Intake/Output Summary (Last 24 hours) at 9/17/2022 1721  Last data filed at 9/17/2022 1033  Gross per 24 hour   Intake 970 ml   Output 2300 ml   Net -1330 ml Diet:  ADULT DIET; Regular    Exam:  /77   Pulse (!) 109   Temp 98.2 °F (36.8 °C) (Oral)   Resp 22   Ht 6' (1.829 m)   Wt 185 lb (83.9 kg)   SpO2 91%   BMI 25.09 kg/m²     Physical Exam  Constitutional:       General: He is not in acute distress. Appearance: Normal appearance. He is normal weight. He is not ill-appearing, toxic-appearing or diaphoretic. HENT:      Head: Normocephalic and atraumatic. Nose: Nose normal. No rhinorrhea. Comments: NC in use, 3L     Mouth/Throat:      Mouth: Mucous membranes are moist.      Pharynx: Oropharynx is clear. Eyes:      General: No scleral icterus. Extraocular Movements: Extraocular movements intact. Cardiovascular:      Rate and Rhythm: Regular rhythm. Tachycardia present. Pulses: Normal pulses. Heart sounds: Normal heart sounds. No murmur heard. No friction rub. No gallop. Pulmonary:      Effort: Pulmonary effort is normal. No respiratory distress. Breath sounds: Normal breath sounds. No wheezing, rhonchi or rales. Abdominal:      General: Abdomen is flat. Bowel sounds are normal. There is no distension. Palpations: Abdomen is soft. There is no mass. Tenderness: There is no abdominal tenderness. Musculoskeletal:         General: Swelling and signs of injury present. No tenderness or deformity. Normal range of motion. Cervical back: Normal range of motion. No rigidity or tenderness. Comments: Bilateral swelling without pitting, surgical site on LLE   Skin:     General: Skin is warm and dry. Coloration: Skin is not jaundiced or pale. Findings: No bruising or rash. Neurological:      General: No focal deficit present. Mental Status: He is alert and oriented to person, place, and time. Motor: No weakness. Coordination: Coordination normal.   Psychiatric:         Mood and Affect: Mood normal.         Behavior: Behavior normal.         Thought Content:  Thought content normal.         Judgment: Judgment normal.        Labs:   Recent Labs     09/15/22  1800 09/16/22  0153 09/17/22  0559 09/17/22  1012   WBC 24.5* 20.8* 15.7*  --    HGB 12.8* 10.5* 7.4* 7.2*   HCT 38.6* 32.0* 22.8* 22.3*   * 402* 303  --        Recent Labs     09/16/22  0153 09/16/22  0631 09/17/22  0559   * 131* 132*   K 5.0 4.5 3.5   CL 95* 96* 100   CO2 20* 22* 22*   BUN 43* 41* 32*   CREATININE 1.9* 1.8* 1.3*   CALCIUM 8.4* 8.5 7.6*       Recent Labs     09/15/22  1800 09/16/22  0153   AST 29 25   ALT 47 36   BILITOT 1.3* 1.0   ALKPHOS 123 99       Recent Labs     09/15/22  1800   INR 1.26*       No results for input(s): CKTOTAL, TROPONINI in the last 72 hours. Urinalysis:      Lab Results   Component Value Date/Time    NITRU NEGATIVE 09/15/2022 08:50 PM    WBCUA 0-2 09/15/2022 08:50 PM    BACTERIA NONE SEEN 09/15/2022 08:50 PM    RBCUA 5-10 09/15/2022 08:50 PM    BLOODU TRACE 09/15/2022 08:50 PM    GLUCOSEU NEGATIVE 09/15/2022 08:50 PM       Radiology:  CTA ABDOMEN PELVIS W WO CONTRAST   Final Result   1. Postop changes in the abdomen related to aortobifemoral bypass graft insertion. Extensive intra-abdominal soft tissue stranding and chronic hematoma at the operative site and tracking into the pelvis along the psoas muscles bilaterally, worse on the    right. 2. Focal infarcts in the right kidney, unchanged from yesterday. 3. Findings of impending diarrhea. Stent in the inferior vena cava. Hydropic gallbladder. **This report has been created using voice recognition software. It may contain minor errors which are inherent in voice recognition technology. **      Final report electronically signed by Dr. Gladys Sanchez on 9/17/2022 3:14 PM      IR TRANSCATH PLACE INTRAVASC STENT OPEN/PERC W OR WO ANGIO EACH ADDL VEIN   Final Result   1. Extensive venous thrombosis both common iliac veins with extension into the inferior vena cava.  Status post successful thromboaspiration procedure with significant improvement. .   2. Status post angioplasty of the inferior vena cava with suboptimal result. Status post successful stenting of the inferior vena cava restoring good flow lumen caliber to the IVC. **This report has been created using voice recognition software. It may contain minor errors which are inherent in voice recognition technology. **      Final report electronically signed by Dr. Jennifer Olson on 9/16/2022 5:21 PM      CT ABDOMEN PELVIS WO CONTRAST Additional Contrast? None   Final Result   1. There is appearance of the aortobifemoral graft placement. 2. Large right retroperitoneal hematoma. Small left retroperitoneal hematoma. These are felt to relate to prior abdominal aortic aneurysm. 3. The inferior vena cava is compressed distally by the large right retroperitoneal hematoma. **This report has been created using voice recognition software. It may contain minor errors which are inherent in voice recognition technology. **      Final report electronically signed by Dr Aym Gan on 9/16/2022 10:29 AM      IR GUIDED [facility] VEIN   Final Result   1. Extensive venous thrombosis. 2. Status post successful declot/thrombectomy procedure, as outlined above. 3. Marked narrowing of the inferior vena cava, appears related to external compression. Angioplasty of the inferior vena cava and left innominate vein was performed, unfortunately, with no significant improvement. **This report has been created using voice recognition software. It may contain minor errors which are inherent in voice recognition technology. **         Final report electronically signed by Dr. Jennifer Olson on 9/16/2022 11:31 AM      VL DUP LOWER EXTREMITY VENOUS BILATERAL   Final Result   1. Large acute deep venous thrombosis extending from the left common    femoral vein through the visualized veins of the calf.    2. Acute deep venous thrombosis present within the right common femoral,    proximal superficial femoral and peroneal veins. This document has been electronically signed by: Sesar Alcazar MD on    09/15/2022 08:28 PM      Technique Used: Duplex examination performed utilizing grayscale, color    and spectral analysis. CTA Chest W WO Contrast   Final Result   1. Heavy burden pulmonary emboli within the segmental and subsegmental    arteries of all 5 lobes. No evidence of right heart strain. 2. Small amount of tree-in-bud nodularity along the left lung base    suggestive of an infectious or inflammatory process including aspiration    pneumonitis. This document has been electronically signed by: Sesar Alcazar MD on    09/15/2022 07:07 PM      All CTs at this facility use dose modulation techniques and iterative    reconstructions, and/or weight-based dosing   when appropriate to reduce radiation to a low as reasonably achievable. 3D Post-processing was performed on this study. XR CHEST PORTABLE   Final Result   1. No acute cardiopulmonary abnormality. This document has been electronically signed by: Sesar Alcazar MD on    09/15/2022 06:33 PM          Diet: ADULT DIET;  Regular    DVT prophylaxis: [] Lovenox                                 [] SCDs                                 [x] Heparin gtt                                 [] Encourage ambulation           [] Already on Anticoagulation     Disposition:    [] Home       [] TCU       [] Rehab       [] Psych       [x] SNF       [] [place]       [] Other-    Code Status: Full Code    PT/OT Eval Status: PT/OT consulted      Electronically signed by Ugo Metz DO on 9/17/2022 at 5:21 PM

## 2022-09-17 NOTE — PROGRESS NOTES
Patient A&O x 4. No change from previous morning assessment. No concerns or needs at this time. Call light and over bed table in bia.  Arlene MICHELLE/MELISSA

## 2022-09-17 NOTE — PROGRESS NOTES
1230 Right groin slip not removed and 4 x 4 with op-site dressing applied. Site without redness, swelling or hematoma. RN notified of care of site.

## 2022-09-17 NOTE — PROGRESS NOTES
Patient A&O x 4. Mucous membranes pink, moist and intact. PERRL 4mm to 3mm brisk. Speech clear and appropriate. Upper extremities able to move without difficulty. No numbness or tingling present. Upper extremities warm and dry. Hand grasp strong bilateral. Capillary refill less than 3 seconds. Skin turgor brisk. Heart sounds strong. Lung sounds clear and diminished throughout. Lung expansion symmetrical. Respirations easy and unlabored. Bowel sounds active all four quadrants. Abdomen soft, flat, non-tender to palpation. Incision midline, well approximated with staples intact open to air. No drainage noted. Right groin incision dressing intact. Lower extremities warm and dry. Able to move lower extremities without difficulty. States he has numbness in the right leg, no numbness present in left leg. Pedal pulses moderate and regular bilateral. Pedal push and pull strong. No edema present. No concerns or needs at this time. Call light and over bed table in reach.  Arlene MICHELLE/MELISSA

## 2022-09-17 NOTE — CONSENT
Informed Consent for Blood Component Transfusion Note    I have discussed with the patient the rationale for blood component transfusion; its benefits in treating or preventing fatigue, organ damage, or death; and its risk which includes mild transfusion reactions, rare risk of blood borne infection, or more serious but rare reactions. I have discussed the alternatives to transfusion, including the risk and consequences of not receiving transfusion. The patient had an opportunity to ask questions and had agreed to proceed with transfusion of blood components.     Electronically signed by Dale Craft MD on 9/17/22 at 11:16 AM EDT

## 2022-09-18 ENCOUNTER — APPOINTMENT (OUTPATIENT)
Dept: INTERVENTIONAL RADIOLOGY/VASCULAR | Age: 62
DRG: 252 | End: 2022-09-18
Payer: COMMERCIAL

## 2022-09-18 PROBLEM — R00.0 SINUS TACHYCARDIA: Status: ACTIVE | Noted: 2022-09-18

## 2022-09-18 LAB
ANION GAP SERPL CALCULATED.3IONS-SCNC: 9 MEQ/L (ref 8–16)
BASOPHILS # BLD: 0.7 %
BASOPHILS ABSOLUTE: 0.1 THOU/MM3 (ref 0–0.1)
BUN BLDV-MCNC: 14 MG/DL (ref 7–22)
CALCIUM SERPL-MCNC: 8.2 MG/DL (ref 8.5–10.5)
CHLORIDE BLD-SCNC: 104 MEQ/L (ref 98–111)
CO2: 25 MEQ/L (ref 23–33)
CREAT SERPL-MCNC: 0.9 MG/DL (ref 0.4–1.2)
EOSINOPHIL # BLD: 0.4 %
EOSINOPHILS ABSOLUTE: 0 THOU/MM3 (ref 0–0.4)
ERYTHROCYTE [DISTWIDTH] IN BLOOD BY AUTOMATED COUNT: 15.6 % (ref 11.5–14.5)
ERYTHROCYTE [DISTWIDTH] IN BLOOD BY AUTOMATED COUNT: 51.3 FL (ref 35–45)
GFR SERPL CREATININE-BSD FRML MDRD: 86 ML/MIN/1.73M2
GLUCOSE BLD-MCNC: 95 MG/DL (ref 70–108)
HCT VFR BLD CALC: 26.2 % (ref 42–52)
HCT VFR BLD CALC: 27.6 % (ref 42–52)
HEMOGLOBIN: 8.5 GM/DL (ref 14–18)
HEMOGLOBIN: 9.1 GM/DL (ref 14–18)
HEPARIN UNFRACTIONATED: < 0.04 U/ML (ref 0.3–0.7)
HEPARIN UNFRACTIONATED: < 0.04 U/ML (ref 0.3–0.7)
IMMATURE GRANS (ABS): 0.26 THOU/MM3 (ref 0–0.07)
IMMATURE GRANULOCYTES: 2.2 %
LYMPHOCYTES # BLD: 8.5 %
LYMPHOCYTES ABSOLUTE: 1 THOU/MM3 (ref 1–4.8)
MCH RBC QN AUTO: 29.5 PG (ref 26–33)
MCHC RBC AUTO-ENTMCNC: 33 GM/DL (ref 32.2–35.5)
MCV RBC AUTO: 89.6 FL (ref 80–94)
MONOCYTES # BLD: 8.9 %
MONOCYTES ABSOLUTE: 1.1 THOU/MM3 (ref 0.4–1.3)
NUCLEATED RED BLOOD CELLS: 0 /100 WBC
PLATELET # BLD: 249 THOU/MM3 (ref 130–400)
PMV BLD AUTO: 10.5 FL (ref 9.4–12.4)
POTASSIUM REFLEX MAGNESIUM: 3.6 MEQ/L (ref 3.5–5.2)
RBC # BLD: 3.08 MILL/MM3 (ref 4.7–6.1)
SEG NEUTROPHILS: 79.3 %
SEGMENTED NEUTROPHILS ABSOLUTE COUNT: 9.4 THOU/MM3 (ref 1.8–7.7)
SODIUM BLD-SCNC: 138 MEQ/L (ref 135–145)
WBC # BLD: 11.8 THOU/MM3 (ref 4.8–10.8)

## 2022-09-18 PROCEDURE — 2580000003 HC RX 258: Performed by: STUDENT IN AN ORGANIZED HEALTH CARE EDUCATION/TRAINING PROGRAM

## 2022-09-18 PROCEDURE — 06703DZ DILATION OF INFERIOR VENA CAVA WITH INTRALUMINAL DEVICE, PERCUTANEOUS APPROACH: ICD-10-PCS | Performed by: RADIOLOGY

## 2022-09-18 PROCEDURE — 2060000000 HC ICU INTERMEDIATE R&B

## 2022-09-18 PROCEDURE — 06H03DZ INSERTION OF INTRALUMINAL DEVICE INTO INFERIOR VENA CAVA, PERCUTANEOUS APPROACH: ICD-10-PCS | Performed by: RADIOLOGY

## 2022-09-18 PROCEDURE — C1880 VENA CAVA FILTER: HCPCS

## 2022-09-18 PROCEDURE — 99255 IP/OBS CONSLTJ NEW/EST HI 80: CPT | Performed by: THORACIC SURGERY (CARDIOTHORACIC VASCULAR SURGERY)

## 2022-09-18 PROCEDURE — 6360000002 HC RX W HCPCS: Performed by: STUDENT IN AN ORGANIZED HEALTH CARE EDUCATION/TRAINING PROGRAM

## 2022-09-18 PROCEDURE — 80048 BASIC METABOLIC PNL TOTAL CA: CPT

## 2022-09-18 PROCEDURE — 6360000004 HC RX CONTRAST MEDICATION: Performed by: RADIOLOGY

## 2022-09-18 PROCEDURE — 85025 COMPLETE CBC W/AUTO DIFF WBC: CPT

## 2022-09-18 PROCEDURE — 85520 HEPARIN ASSAY: CPT

## 2022-09-18 PROCEDURE — 99233 SBSQ HOSP IP/OBS HIGH 50: CPT | Performed by: FAMILY MEDICINE

## 2022-09-18 PROCEDURE — 37191 INS ENDOVAS VENA CAVA FILTR: CPT

## 2022-09-18 PROCEDURE — C1725 CATH, TRANSLUMIN NON-LASER: HCPCS

## 2022-09-18 PROCEDURE — 37248 TRLUML BALO ANGIOP 1ST VEIN: CPT

## 2022-09-18 PROCEDURE — 36415 COLL VENOUS BLD VENIPUNCTURE: CPT

## 2022-09-18 RX ADMIN — CEFTRIAXONE SODIUM 1000 MG: 1 INJECTION, POWDER, FOR SOLUTION INTRAMUSCULAR; INTRAVENOUS at 22:53

## 2022-09-18 RX ADMIN — SODIUM CHLORIDE, PRESERVATIVE FREE 10 ML: 5 INJECTION INTRAVENOUS at 09:44

## 2022-09-18 RX ADMIN — IOPAMIDOL 50 ML: 612 INJECTION, SOLUTION INTRAVENOUS at 14:39

## 2022-09-18 ASSESSMENT — PAIN SCALES - GENERAL
PAINLEVEL_OUTOF10: 0

## 2022-09-18 NOTE — H&P
Formulation and discussion of sedation / procedure plans, risks, benefits, side effects and alternatives with patient and/or responsible adult completed.     Electronically signed by Funmilayo Palacio MD on 9/18/22 at 2:43 PM EDT

## 2022-09-18 NOTE — PLAN OF CARE
Hospitalist -- d/w Dr. Lulu Byers concern for bleeding on heparin gtt with drop in h/h yesterday to 7.2 from 12.8 on 9/15 and 10.5 on 9/16 --> he stopped heparin gtt and CTA abd/pelvis done and cont with RP hematoma (present on admission) but with tracking to psoas muscles bilaterally R>L. Thus Dr. Lulu Byers recommending IVC filter as plan to continue to hold heparin at this time - d/w Dr. Jose Antonio Vasquez IR as pt with recent IVC stent placement - he will place IVC filter today and then will await CTS recs when feel comfortable resuming heparin/anticoagulation given pt's extensive DVT's and Pe's. Cont monitor h/h. Procedure d/w pt and he is agreeable. Further risks of procedure to be discussed with pt by Dr. Jose Antonio Vasquez.     Electronically signed by Ann Marie Beaulieu MD on 9/18/2022 at 12:43 PM

## 2022-09-18 NOTE — FLOWSHEET NOTE
09/18/22 1311   Safe Environment   Safety Measures   (VIrtual RN rounds complete)       PT currently with therapy staff. Denies needs at this time.

## 2022-09-18 NOTE — PROGRESS NOTES
Received report from night nurse and primary nurse, [facility]. Pt appears to be resting comfortably in bed. Call light and personal items within reach.  SN Koffi [place] [facility] [place]

## 2022-09-18 NOTE — FLOWSHEET NOTE
Notified of results being in the computer for CTA. No bleed per Dr. Teressa Garduno. Continue to hold heparin and check hemoglobin.

## 2022-09-18 NOTE — PROGRESS NOTES
Hospitalist Progress Note      Patient:  Rahel Gomezion      Unit/Bed:-01/001-A    YOB: 1960    MRN: [id number]       Acct: [de-identified]     PCP: WENDI Polo    Date of Admission: 9/15/2022    Date/Time of Evaluation:  9/18/2022 at 9:02 AM    Assessment/Plan:     Bilateral Multilobular Pulmonary Embolism and Bilateral Proximal DVTs: Confirmed on CTA chest 9/15/22 and dopplers of BLE 9/15/22 --> PE's  noted to be heavy burden w/in segmental and subsegmental of all 5 lobes but no right heart strain. BP has remained stable. Bilateral DVTs are proximal with left > right. Was hospitalized in [place] for 2 weeks for AAA repair (8/29 - 9/13)  -- Patient placed on heparin gtt. and underwent LLE thrombectomy by Dr. Ye Saleh 9/15 and then IVC stent placement 9/16 with additional thromboaspiration  - Heparin gtt started on admission -> stopped 9/18 per CTS for concern of bleeding with drop in h/h 9/17   - ?need IVC filter if cannot anticoagulate -> await CTS recs  - Consulted PT/OT   - Encourage IS  - wean O2 as able --> on 3 L NC as of 9/18/2022   - CBC daily  - Echo 9/16/22 w/o RV strain with extensive Pe's, EF 40% with mod global hypokinesis, grade 1 DD     Acute likely blood loss Anemia --- worsening 9/17 --  Drop in Hgb from 12.8 on 9/15 to 10.5 on 9/16 to 7.4-7.2 on 9/17. CTA abd per CTS with findings have not identified an acute bleed 9/16 nor 9/17, however the patient has bilateral retroperitoneal hematomas (POA)  -- ?due to heparin and cont bleeding vs ??loss due to recurrent thromboaspiration of BLE and IVC clots  - Consulted Thoracic surgery --> per Dr. Jojo Alcazar pt Transfused 1 unit PRBC 9/17 and hgb up to 9.1  - Daily CBC  - ?need to r/o GI etiology  - Transfuse <7  - no signs of hemolysis     LV dysfunction with diastolic dysfunction --  Given extensive clot history, this may be an acute development, however chronic cardiac dysfunction cannot be excluded entirely.  ECHO from 9/16/ss with moderatedly reduced systolic function and EF of 40% as well as grade 1 diastolic dysfunction.  - Monitor on telemetry, monitor   - Consider consult to cardiology      Compression of Distal IVC with bilateral R >L retroperitoneal hematomas -- per IR s/p further declot/thrombectomy and angioplasty of IVC and left innominate vein 9/16 w/o much improvement thus followed by:  -- 9/17 -> Per IR -->  Extensive venous thrombosis both common iliac veins with extension into the inferior vena cava. Status post successful thromboaspiration procedure with significant improvement and s/p agnioplasty of IVC and s/p stenting of IVC restoring good flow lumen caliber to IVC     AGNES, Pre-Renal etiology, Improving: up to 2.1 on admission 9/15 --> FeNa 0.3% 9/15. Recently hospitalized at [facility], s/p AAA repair, ?heavy contrast exposure? --> noted per CTA abd 9/17 of focal infarcts in right kidney --. ? ? Cr baseline ~1.1-1.4 - monitor  - Reduced IV NS @ 50 cc/h, continue encouraging PO hydration -> ?stop 9/19 if remains stable  - 9/17 got contrast CTA  - 9/17 Cr improved to 1.3 and further improved to 0.9 on 9/18 -- cont monitor  - Holding home ACE inhibitor   - Daily weights, I/Os, BMP     Leukocytosis: afeb - CT chest on admission with small amount of tree-in-bud nodularity along the left lung base suspicious for aspiration pneumonia vs PE inflammation. Afebrile. No history of recent cough.  No diarrhea and no acute findings of infxn on CT abd  - Procalcitonin elevation likely due to decreased renal clearance but ??due to pneumonia   - C/w empiric IV Rocephin started on admission 9/15 x 7 days and WBC improving to WNL as of 9/18  - Daily CBC  - u/a 9/15 (-), no BC done on admission     Possible pneumonia LLL  -- CTA chest on admission 9/15/22 = Small amount of tree-in-bud nodularity along the left lung base   suggestive of an infectious or inflammatory process including aspiration pneumonitis   - PCT elevated 0.54 and WBC up to 24.5 on admission 9/15 thus will treat as infxn with recent extensive admission at [facility]  - cont rocephin started 9/16    Combined Metabolic Alkalosis with Respiratory Alkalosis, improved: Due to AGNES and tachypnea from PE - improving and WNL on 9/18 - monitor     Mild Asymptomatic Hyponatremia, stable: Etiology likely AGNES and per [facility] records pt was d/c with HCTZ?? -- holding any diuretics and improving with IVF to 138 on 9/18 from 129 on admission 9/15 -- cont IVF 9/18 and ?stop 9/19 if remains stable  - Daily BMP     Mild Asymptomatic Hyperkalemia 2/2 ACE-I Therapy: K 5.6 on arrival.   - Holding ACE inhibitor   - IVF started on admission  - Daily BMP     Sinus tachycardia -- due to above -monitor tele - ?BB as was on lopressor at d/c per d/c summary at [facility] -- monitor tele    Thrombocytosis -- likely reactive -- improving but dropping s/p heparin gtt - ??need to r/o HIT if cont to dop    Recent AAA rupture s/p emergent Repair: Performed at [facility] 8/29/22 with +cardiac arrest also noted per notes reviewed and had returned to OR 8/31/22 for a washout   - Consulted thoracic surgery with above and apprec assist  - s/p 27 units PRBC, 28 units FFP, 5 cryo, 3 packs plts noted per record review  - no active bleeding from aorta per CTA abd 9/17/22 but has residual bilateral RP hematomas    Essential HTN -- ?on lisinopril at [facility] - per d/c summary from [facility] pt was dc with metoprolol and HCTZ - hold all meds as BP controlled -- tight control with recent AAA rupture    Hx ETOH abuse - LFT WNL - no ETOH since surgery 8/29/22    Tobacco abuse - +smoker prior to AAA rupture -> cont encourage cessation    Deconditioning -- from recent hospitalization at [facility] and currently - was d/c to [facility] from [facility] -> PT/OT c/s and monitor progress -- ? ? PMR c/s vs ECF at dc            Chief Complaint: GUZMAN/SOB, tachypnea, leg swelling     Hospital Course: per H&P   \" Oneida Moffett is a 62 y.o. male with PMHx as noted below who presents to [address] [place] [address] for evaluation of worsening GUZMAN, tachypnea and lower extremity swelling. Patient recently was discharged from [facility] where he was there for 2 weeks for a AAA repair. Patient was discharged to [facility] for further rehab. Reports that he arrived there on the day prior to admission around 2 PM.  On the morning of admission patient states that he tried to walk around and noticed that after just a few feet he would get short of breath. Later in the morning he noticed that his upper left leg started getting sore. He subsequently started developing hyperventilation approximately 1 hour after. EMS was called and patient was brought to the ED for further evaluation. Patient states that he has never had a blood clot before in the past.    Per Dr. Jimenez Dear note 9/17/22  \"Summarized ED workup and finding  -- On arrival patient was found to be afebrile, tachypnea, tachycardic and hypertensive. He was placed on 3 L of O2 via NC.  -- Labs revealed a mild hyponatremia, mild hyperkalemia, prerenal AGNES, reactive leukocytosis, reactive thrombocythemia  -- EKG revealed sinus tachycardia  -- CTA chest revealed heavy burden of pulmonary emboli within the segmental and subsegmental arteries of all 5 lobes. VL venous duplex ultrasound of the lower extremities revealed a large left DVT with extension from the veins in the calf to the left common femoral vein. There is also a DVT in the right common femoral, proximal superficial femoral and peroneal veins  --Placed on heparin gtt. and immediately transferred to IR where Dr. Melody Schmid performed a thrombectomy of the LLE as well as angioplasty of the IVC  -- Dr. Melody Schmid spoke to this resident over telephone; patient has what appears to be a compressed IVC which was unable to be opened up via angioplasty.   Recommends stenting tomorrow morning     Pertinent/Relevant History  -- Patient was recently discharged from [facility] for a AAA repair  -- He was then transferred to [facility] for rehab where on the day before admission he had the following events occur in chronologic order: proximal left leg pain, left leg swelling, GUZMAN and then tachypnea\"     9/16 - CT abd pelvis w/o contrast this AM, Bilateral retroperitoneal hematoma R>L which may relate to prior AAA, and IVC compressed distally by right retroperitoneal hematoma. ICV stent today, successful. Leukocytosis improving today, will follow, empiric Rocephin. NS at 125 ml/hr perioperatively from IR stenting, will begin weaning tomorrow with full diet. Cr improving to 1.8, Na to 131, K WNL at 4.5.     9/17 - Hemoglobin dropped this AM from 10.5 to 7.4, repeat 7.2. Consulted Vascular surgery. Patient was consented for blood products and transfused, H&H trends ordered. Abd CTA w/wo contrast taken, revealed stable post-surgical changes compared to CT 9/16. Will will continue the heparin gtt despite drop in Hgb of unclear etiology given his extensive clotting with bilateral PE and bilateral DVTs. IR took out R groin slip knot, PT/OT consulted for tomorrow morning. Cr improving to 1.3, Na improving to 132, K WNL at 3.5. \"      Subjective/HPI:   -- 9/18/2022  --> pt w/o new c/o - denies any cp. No abd pain. C/o some numbness and tingling in right leg but no pain. Felix po. No n/v. Denies f/c. On 3L O2. Afebrile. Hasn't gotten OOB yet. Last BM 9/18 - -2.2 L since admission      PMH, SURGICAL HX, FH, SOCIAL HX reviewed and updated as needed.     Medications:  Reviewed    Infusion Medications    sodium chloride      sodium chloride      sodium chloride      sodium chloride 50 mL/hr at 09/17/22 1105     Scheduled Medications    sodium chloride flush  5-40 mL IntraVENous 2 times per day    cefTRIAXone (ROCEPHIN) IV  1,000 mg IntraVENous Q24H    [Held by provider] lisinopril  20 mg Oral Daily     PRN Meds: sodium chloride, sodium chloride, potassium chloride **OR** potassium alternative oral replacement **OR** potassium chloride, magnesium sulfate, sodium chloride flush, sodium chloride, ondansetron **OR** ondansetron, polyethylene glycol, acetaminophen **OR** acetaminophen, docusate sodium, morphine **OR** morphine      Intake/Output Summary (Last 24 hours) at 9/18/2022 0902  Last data filed at 9/18/2022 0640  Gross per 24 hour   Intake 1085.5 ml   Output 2050 ml   Net -964.5 ml       Diet:  ADULT DIET; Regular    Exam:  /81   Pulse 100   Temp 98 °F (36.7 °C) (Oral)   Resp 20   Ht 6' (1.829 m)   Wt 185 lb (83.9 kg)   SpO2 93%   BMI 25.09 kg/m²     General appearance: No apparent distress, appears stated age and cooperative. Oriented x 3. HEENT: Pupils equal, round, and reactive to light. Conjunctivae/corneas clear. MMM. Neck: Supple, with full range of motion. Trachea midline. Respiratory:  Normal respiratory effort. Diminished but otherwise Clear to auscultation, bilaterally without Rales/Wheezes/Rhonchi. No respiratory distress or accessory muscle use. Cardiovascular: Regular rhythm , slightly tachy with normal S1/S2 without murmurs, rubs or gallops. No JVD. Abdomen: Soft, non-tender, non-distended with normal bowel sounds. Midline abd incision C/D/I with some staples in place, No TTP. Right groin with bandage in place w/o palpable hematoma. No rebound or guarding  Musculoskeletal: No clubbing, cyanosis or edema bilaterally. Full range of motion without deformity. No calf tenderness palpation  Skin: Skin color, texture, turgor normal.  No rashes or lesions. Neurologic:  Neurovascularly intact without any focal sensory/motor deficits.  Cranial nerves: II-XII intact, grossly non-focal.  Psychiatric: Alert and oriented, thought content appropriate, normal insight  Capillary Refill: Brisk,< 3 seconds   Peripheral Pulses: +2 palpable, equal bilaterally       All labs reviewed and interpreted by me:  Labs:   Recent Labs     09/16/22  0153 09/17/22  0559 09/17/22  1012 09/17/22  2347 09/18/22  0618   WBC 20.8* 15.7*  --   --  11.8*   HGB 10.5* 7.4* 7.2* 8.5* 9.1*   HCT 32.0* 22.8* 22.3* 26.2* 27.6*   * 303  --   --  249     Recent Labs     09/16/22  0631 09/17/22  0559 09/18/22  0618   * 132* 138   K 4.5 3.5 3.6   CL 96* 100 104   CO2 22* 22* 25   BUN 41* 32* 14   CREATININE 1.8* 1.3* 0.9   CALCIUM 8.5 7.6* 8.2*     Recent Labs     09/15/22  1800 09/16/22  0153   AST 29 25   ALT 47 36   BILITOT 1.3* 1.0   ALKPHOS 123 99     Recent Labs     09/15/22  1800   INR 1.26*     Recent Labs     09/15/22  1800   TROPONINT < 0.010       Urinalysis:      Lab Results   Component Value Date/Time    NITRU NEGATIVE 09/15/2022 08:50 PM    WBCUA 0-2 09/15/2022 08:50 PM    BACTERIA NONE SEEN 09/15/2022 08:50 PM    RBCUA 5-10 09/15/2022 08:50 PM    BLOODU TRACE 09/15/2022 08:50 PM    GLUCOSEU NEGATIVE 09/15/2022 08:50 PM       All radiology images and reports reviewed and interpreted by me:  Radiology:   [facility] [place]   Final Result   1. Postop changes in the abdomen related to aortobifemoral bypass graft insertion. Extensive intra-abdominal soft tissue stranding and chronic hematoma at the operative site and tracking into the pelvis along the psoas muscles bilaterally, worse on the    right. 2. Focal infarcts in the right kidney, unchanged from yesterday. 3. Findings of impending diarrhea. Stent in the inferior vena cava. Hydropic gallbladder. **This report has been created using voice recognition software. It may contain minor errors which are inherent in voice recognition technology. **      Final report electronically signed by Dr. Jj Little on 9/17/2022 3:14 PM      IR TRANSCATH PLACE INTRAVASC STENT OPEN/PERC W OR WO ANGIO EACH ADDL VEIN   Final Result   1. Extensive venous thrombosis both common iliac veins with extension into the inferior vena cava. Status post successful thromboaspiration procedure with significant improvement. .   2. Status post angioplasty of the inferior vena cava with suboptimal result. Status post successful stenting of the inferior vena cava restoring good flow lumen caliber to the IVC. **This report has been created using voice recognition software. It may contain minor errors which are inherent in voice recognition technology. **      Final report electronically signed by Dr. Dolores Mcbride on 9/16/2022 5:21 PM      CT ABDOMEN PELVIS WO CONTRAST Additional Contrast? None   Final Result   1. There is appearance of the aortobifemoral graft placement. 2. Large right retroperitoneal hematoma. Small left retroperitoneal hematoma. These are felt to relate to prior abdominal aortic aneurysm. 3. The inferior vena cava is compressed distally by the large right retroperitoneal hematoma. **This report has been created using voice recognition software. It may contain minor errors which are inherent in voice recognition technology. **      Final report electronically signed by Dr Zach Grande on 9/16/2022 10:29 AM      IR GUIDED [facility] VEIN   Final Result   1. Extensive venous thrombosis. 2. Status post successful declot/thrombectomy procedure, as outlined above. 3. Marked narrowing of the inferior vena cava, appears related to external compression. Angioplasty of the inferior vena cava and left innominate vein was performed, unfortunately, with no significant improvement. **This report has been created using voice recognition software. It may contain minor errors which are inherent in voice recognition technology. **         Final report electronically signed by Dr. Dolores Mcbride on 9/16/2022 11:31 AM      VL DUP LOWER EXTREMITY VENOUS BILATERAL   Final Result   1. Large acute deep venous thrombosis extending from the left common    femoral vein through the visualized veins of the calf.    2. Acute deep venous thrombosis present within the right common femoral,    proximal superficial femoral and peroneal veins. This document has been electronically signed by: Nohemi Hurd MD on    09/15/2022 08:28 PM      Technique Used: Duplex examination performed utilizing grayscale, color    and spectral analysis. CTA Chest W WO Contrast   Final Result   1. Heavy burden pulmonary emboli within the segmental and subsegmental    arteries of all 5 lobes. No evidence of right heart strain. 2. Small amount of tree-in-bud nodularity along the left lung base    suggestive of an infectious or inflammatory process including aspiration    pneumonitis. This document has been electronically signed by: Nohemi Hurd MD on    09/15/2022 07:07 PM      All CTs at this facility use dose modulation techniques and iterative    reconstructions, and/or weight-based dosing   when appropriate to reduce radiation to a low as reasonably achievable. 3D Post-processing was performed on this study. XR CHEST PORTABLE   Final Result   1. No acute cardiopulmonary abnormality. This document has been electronically signed by: Nohemi Hurd MD on    09/15/2022 06:33 PM          Diet: ADULT DIET;  Regular    Moody: no    Microbiology:  u/a 9/15 - otherwise (-)    Tele review last 24 hrs:  SR/'s    DVT prophylaxis: [] Lovenox                                 [] SCDs                                 [] SQ Heparin                                 [] Encourage ambulation           [x] Already on Anticoagulation - heparin gtt -> stopped 9/17     Disposition:    [] Home       [] TCU       [] Rehab       [] Psych       [x] SNF       [] [place]       [] Other-    Code Status: Full Code    PT/OT Eval Status: consulted      Electronically signed by Marina Yuan MD on 9/18/2022 at 9:02 AM

## 2022-09-18 NOTE — CONSULTS
CT/CV Surgery Consult Note    9/18/2022 11:44 AM    Reason for Consult: Surgical evaluation for pulmonary embolism, extensive proximal vein DVT, status post balloon angioplasty of inferior vena cava and IVC stent yesterday, and recent ruptured abdominal aortic aneurysm repair with an aortobifemoral bypass grafting at [place] on 8/29/2022. Chief complaint: Shortness of breath, known extensive pulmonary embolism. History of present illness:    Claire Horvath is a 62years old male with a past medical history of hypertension and EtOH abuse. He developed a ruptured abdominal aortic aneurysm on 8/29/2022 and was transferred to the [place] where he underwent an emergency repair with an aortobifemoral bypass grafting. Medical record from the [facility] indicated that he had received multiple blood transfusion(27 PRBC, 28 FFP, 3 Plt, 5 Cryo), developed cardiac arrest during the procedure and successful resuscitation. He was hospitalized for 2 weeks and was discharged to the  [facility], rehab facility. But he developed shortness of breath, and bilateral lower extremity swelling (Lt > Rt) after admission to the rehab facility and was brought to our [facility] ED by EMS. Chest CT scan demonstrated pulmonary embolism involving all 5 lobes. And venous duplex study confirmed proximal DVT. Intravenous heparin was started. And he underwent a thromboaspiration procedure and stenting of the inferior vena cava. He then developed evidence of bleeding with hemoglobin dropped from 12.8 to 7.2 over 2 days. The abdominal CT scan 9/16/2022 revealed a large retroperitoneal hematoma. The intravenous heparin was stopped. He received 2 units of PRBC yesterday. He had a follow-up abdominal CT scan yesterday, which demonstrated stable retroperitoneal hematoma without evidence of active bleeding. He is currently in a telemetry unit without any acute distress. He is awake and alert.   He denies any shortness of breath at rest.  He denies any discomfort or swelling of the lower extremity. He is on 3 L of oxygen via nasal cannula and his O2 saturation runs at 93%. Vital Signs: /81   Pulse 100   Temp 98 °F (36.7 °C) (Oral)   Resp 20   Ht 6' (1.829 m)   Wt 185 lb (83.9 kg)   SpO2 93%   BMI 25.09 kg/m²    Temp (24hrs), Av.2 °F (36.8 °C), Min:97.7 °F (36.5 °C), Max:99.1 °F (37.3 °C)      PULSE OXIMETRY RANGE: SpO2  Av.4 %  Min: 91 %  Max: 96 %    SUPPLEMENTAL O2: O2 Flow Rate (L/min): 3 L/min     Labs:   CBC:     Recent Labs     09/15/22  1800 22  0153 22  0559 22  1012 22  2347 22  0618   WBC 24.5* 20.8* 15.7*  --   --  11.8*   HGB 12.8* 10.5* 7.4* 7.2* 8.5* 9.1*   HCT 38.6* 32.0* 22.8* 22.3* 26.2* 27.6*   MCV 88.5 89.1 90.5  --   --  89.6   * 402* 303  --   --  249   APTT 30.8  --   --   --   --   --    INR 1.26*  --   --   --   --   --      BMP:   Recent Labs     22  0631 22  0559 22  0618   * 132* 138   K 4.5 3.5 3.6   CL 96* 100 104   CO2 22* 22* 25   BUN 41* 32* 14   CREATININE 1.8* 1.3* 0.9   MG 2.0 2.2  --      Last HgA1C: No results found for: LABA1C    Imaging:  I have reviewed all radiologic studies including chest, abdominal CT scans, and IR procedures.       Intake/Output Summary (Last 24 hours) at 2022 1144  Last data filed at 2022 0914  Gross per 24 hour   Intake 1545.5 ml   Output 1750 ml   Net -204.5 ml       Scheduled Meds:    sodium chloride flush  5-40 mL IntraVENous 2 times per day    cefTRIAXone (ROCEPHIN) IV  1,000 mg IntraVENous Q24H    [Held by provider] lisinopril  20 mg Oral Daily         PastMedical History:  Sam Riddle  has a past medical history of Acute deep vein thrombosis (DVT) of proximal vein of both lower extremities ([address] Acute pulmonary embolism without acute cor pulmonale ([address] History of ETOH abuse, Hypertension, Pre-op evaluation: prior to abdominal hernia surgery, and Tobacco abuse. Past Surgical History:  The patient  has a past surgical history that includes Umbilical hernia repair (11/22/2016); Abdominal aortic aneurysm repair; and IR GUIDED THROMB [facility] VEIN (9/15/2022). Allergies: The patient is allergic to vancomycin. Family History: This patient's family history includes Cancer in his mother. Social History:  Francisco Serrano  reports that he has been smoking cigarettes. He has been smoking an average of .5 packs per day. He has never used smokeless tobacco. He reports current alcohol use. He reports that he does not use drugs. ROS:  Constitutional: Negative for activity change, chills, fatigue, fever and unexpected weight change. HENT: Negative for congestion, facial swelling, sore throat, and changes in voice. Eyes: Negative for photophobia, redness, itching and visual disturbance. Respiratory: Shortness of breath. Known pulmonary embolism including all 5 lobes  Cardiovascular: Status post recent ruptured abdominal aortic aneurysm repair with aortobifemoral bypass grafting. Gastrointestinal: Negative for abdominal distention, constipation, nausea and vomiting. Endocrine: Negative for cold intolerance, heat intolerance, polyphagia and polyuria. Musculoskeletal: Negative for arthralgias, gait problem, and myalgias. Skin: Negative for color change, rash and wound. Allergic/Immunologic: Negative for food allergies and immunocompromised state. Neurological: Negative for dizziness, tremors, speech difficulty, weakness, numbness and headaches. Hematological: Negative for adenopathy. Does not bruise/bleed easily. Psychiatric/Behavioral: Negative for agitation, confusion, and dysphoric mood. Physical Exam:   General appearance:  No apparent distress, appears stated age and cooperative. HEENT:  Normal cephalic, atraumatic without obvious deformity. Conjunctivae/corneas clear. Neck: Supple, with full range of motion. No jugular venous distention.  Trachea midline. Respiratory:  Normal respiratory effort. Clear to auscultation, bilaterally without rales/wheezes/rhonchi. Cardiovascular:  Regular rate and rhythm with normal S1/S2 without murmurs, rubs or gallops. Abdomen: Soft, non-tender, non-distended with normal bowel sounds. Soft nontender. Midline abdominal incision noted evidence of infection. Musculoskeletal: No swelling of the lower extremity. Skin: Normal skin color of the lower extremity. Neurologic:  Neurovascularly intact without any focal sensory/motor deficits. Psychiatric:  Alert and oriented, thought content appropriate, normal insight. Capillary Refill: Brisk,< 3 seconds   Peripheral Pulses: +2 palpable, equal bilaterally. Right groin is covered with dressing. Left groin incision is healing well. Problem List:  Patient Active Problem List   Diagnosis    Hypertension    Tobacco abuse    History of ETOH abuse    Umbilical hernia without obstruction and without gangrene    Multiple subsegmental pulmonary emboli without acute cor pulmonale (HCC)    Acute bilateral deep vein thrombosis (DVT) of femoral veins (HCC)    Compression of vena cava    AGNES (acute kidney injury) ([address]    Hyponatremia    Hypokalemia    Leukocytosis    Abnormal CT of the chest    Thrombocytosis    Physical deconditioning    Acute anemia    Hypocalcemia    LV dysfunction    Diastolic dysfunction       Assessment: Retroperitoneal hematoma most likely caused by anticoagulation. Status post recent ruptured abdominal aortic aneurysm repair with an aortobifemoral bypass grafting 8/29/2022. Provoked proximal DVT with extensive pulmonary embolism involving all 5 lobes. Status post thromboaspiration procedure and IVC stenting. Plan: 9/18/22  I would recommend IVC filter for now. I would not recommend resumption of anticoagulation for now. Serial H&H. Possible follow-up abdominal CT scan.       Issues discussed in detail with the patient, who understands and has no further questions. Time spent with patient: 80 minutes, of which more than 50% was spent counseling/coordinating the patient's care.     Sapna Calhoun MD

## 2022-09-18 NOTE — OP NOTE
Department of Radiology  Post Procedure Progress Note      Pre-Procedure Diagnosis:  Deep venous thrombosis , pulmonary emboli, anemia     Procedure Performed: inferior vena cava filter insertion , and angioplasty right common iliac vein     Anesthesia: local     Findings: successful    Immediate Complications:  None    Estimated Blood Loss: minimal    SEE DICTATED PROCEDURE NOTE FOR COMPLETE DETAILS.     Juju Lamas MD   9/18/2022 2:43 PM

## 2022-09-18 NOTE — FLOWSHEET NOTE
[employer] notified of consult. Orders received to stop heparin, transfuse 2 units of blood, and obtain CTA of abdomen and pelvis.

## 2022-09-18 NOTE — PROGRESS NOTES
Reported off to primary nurse, Terence Rojo. Pt appears to be resting comfortably in bed with eyes open. Denies any needs at this time. Call light and personal items within reach.  SN Karsten Huslia SURGICAL [place]

## 2022-09-18 NOTE — FLOWSHEET NOTE
09/18/22 1812   Safe Environment   Safety Measures   (VIrtual RN rounds complete)   PT sitting upright in bed, conversing with staff. Denies needs at this time. No acute distress noted. Lindsay Ruth

## 2022-09-18 NOTE — PROGRESS NOTES
26 Pt in specials radiology for IVC filter insertion. Explained procedure to pt and pt verbalizes understanding. Consent signed. [address] to table and attached to monitor. Right groin prepped and draped. 26 Dr Jose Antonio Vasquez to start procedure. 1430 IVC filter deployed per Dr Jose Antonio Vasquez. Pt tolerating well. 1431 Sheath exchanged for short 10 fr sheath. 1433 Angioplasty of right iliac vein with 12 x 40 [place] 35 balloon. 1440 Sheath in right femoral vein pulled and manual pressure applied per Isatu Jc RT. No bleeding noted. 1452 Manual pressure removed. No bleeding noted. Band-aid applied. 1453 Pt positioned on bed for comfort. Transferred to 4K per bed and report called to [employer].

## 2022-09-19 LAB
ANION GAP SERPL CALCULATED.3IONS-SCNC: 9 MEQ/L (ref 8–16)
BASOPHILS # BLD: 0.7 %
BASOPHILS ABSOLUTE: 0.1 THOU/MM3 (ref 0–0.1)
BUN BLDV-MCNC: 10 MG/DL (ref 7–22)
CALCIUM SERPL-MCNC: 8 MG/DL (ref 8.5–10.5)
CHLORIDE BLD-SCNC: 102 MEQ/L (ref 98–111)
CO2: 25 MEQ/L (ref 23–33)
CREAT SERPL-MCNC: 0.8 MG/DL (ref 0.4–1.2)
EKG ATRIAL RATE: 98 BPM
EKG P AXIS: 67 DEGREES
EKG P-R INTERVAL: 114 MS
EKG Q-T INTERVAL: 346 MS
EKG QRS DURATION: 100 MS
EKG QTC CALCULATION (BAZETT): 441 MS
EKG R AXIS: 63 DEGREES
EKG T AXIS: 77 DEGREES
EKG VENTRICULAR RATE: 98 BPM
EOSINOPHIL # BLD: 1 %
EOSINOPHILS ABSOLUTE: 0.1 THOU/MM3 (ref 0–0.4)
ERYTHROCYTE [DISTWIDTH] IN BLOOD BY AUTOMATED COUNT: 15.5 % (ref 11.5–14.5)
ERYTHROCYTE [DISTWIDTH] IN BLOOD BY AUTOMATED COUNT: 49.6 FL (ref 35–45)
GFR SERPL CREATININE-BSD FRML MDRD: > 90 ML/MIN/1.73M2
GLUCOSE BLD-MCNC: 90 MG/DL (ref 70–108)
HCT VFR BLD CALC: 27.4 % (ref 42–52)
HEMOGLOBIN: 8.9 GM/DL (ref 14–18)
IMMATURE GRANS (ABS): 0.17 THOU/MM3 (ref 0–0.07)
IMMATURE GRANULOCYTES: 1.8 %
LYMPHOCYTES # BLD: 11.3 %
LYMPHOCYTES ABSOLUTE: 1.1 THOU/MM3 (ref 1–4.8)
MCH RBC QN AUTO: 29.3 PG (ref 26–33)
MCHC RBC AUTO-ENTMCNC: 32.5 GM/DL (ref 32.2–35.5)
MCV RBC AUTO: 90.1 FL (ref 80–94)
MONOCYTES # BLD: 9 %
MONOCYTES ABSOLUTE: 0.8 THOU/MM3 (ref 0.4–1.3)
NUCLEATED RED BLOOD CELLS: 0 /100 WBC
PLATELET # BLD: 275 THOU/MM3 (ref 130–400)
PMV BLD AUTO: 10.8 FL (ref 9.4–12.4)
POTASSIUM REFLEX MAGNESIUM: 3.8 MEQ/L (ref 3.5–5.2)
RBC # BLD: 3.04 MILL/MM3 (ref 4.7–6.1)
SEG NEUTROPHILS: 76.2 %
SEGMENTED NEUTROPHILS ABSOLUTE COUNT: 7.2 THOU/MM3 (ref 1.8–7.7)
SODIUM BLD-SCNC: 136 MEQ/L (ref 135–145)
WBC # BLD: 9.4 THOU/MM3 (ref 4.8–10.8)

## 2022-09-19 PROCEDURE — 93010 ELECTROCARDIOGRAM REPORT: CPT | Performed by: INTERNAL MEDICINE

## 2022-09-19 PROCEDURE — 80048 BASIC METABOLIC PNL TOTAL CA: CPT

## 2022-09-19 PROCEDURE — 2060000000 HC ICU INTERMEDIATE R&B

## 2022-09-19 PROCEDURE — 36415 COLL VENOUS BLD VENIPUNCTURE: CPT

## 2022-09-19 PROCEDURE — 93005 ELECTROCARDIOGRAM TRACING: CPT

## 2022-09-19 PROCEDURE — 99223 1ST HOSP IP/OBS HIGH 75: CPT | Performed by: PHYSICAL MEDICINE & REHABILITATION

## 2022-09-19 PROCEDURE — 6370000000 HC RX 637 (ALT 250 FOR IP): Performed by: FAMILY MEDICINE

## 2022-09-19 PROCEDURE — 2580000003 HC RX 258

## 2022-09-19 PROCEDURE — 97162 PT EVAL MOD COMPLEX 30 MIN: CPT

## 2022-09-19 PROCEDURE — 85025 COMPLETE CBC W/AUTO DIFF WBC: CPT

## 2022-09-19 PROCEDURE — 97167 OT EVAL HIGH COMPLEX 60 MIN: CPT

## 2022-09-19 PROCEDURE — 6360000002 HC RX W HCPCS: Performed by: STUDENT IN AN ORGANIZED HEALTH CARE EDUCATION/TRAINING PROGRAM

## 2022-09-19 PROCEDURE — 2580000003 HC RX 258: Performed by: STUDENT IN AN ORGANIZED HEALTH CARE EDUCATION/TRAINING PROGRAM

## 2022-09-19 PROCEDURE — 99232 SBSQ HOSP IP/OBS MODERATE 35: CPT | Performed by: FAMILY MEDICINE

## 2022-09-19 PROCEDURE — 97110 THERAPEUTIC EXERCISES: CPT

## 2022-09-19 RX ADMIN — CEFTRIAXONE SODIUM 1000 MG: 1 INJECTION, POWDER, FOR SOLUTION INTRAMUSCULAR; INTRAVENOUS at 23:40

## 2022-09-19 RX ADMIN — SODIUM CHLORIDE: 9 INJECTION, SOLUTION INTRAVENOUS at 03:30

## 2022-09-19 RX ADMIN — METOPROLOL TARTRATE 25 MG: 25 TABLET, FILM COATED ORAL at 19:31

## 2022-09-19 ASSESSMENT — ENCOUNTER SYMPTOMS
VOMITING: 0
SORE THROAT: 0
WHEEZING: 0
TROUBLE SWALLOWING: 0
COUGH: 0
SHORTNESS OF BREATH: 1
EYE PAIN: 0
ABDOMINAL PAIN: 0
BACK PAIN: 0
DIARRHEA: 0
EYE DISCHARGE: 0
NAUSEA: 0
RHINORRHEA: 0
CONSTIPATION: 0

## 2022-09-19 ASSESSMENT — PAIN SCALES - GENERAL
PAINLEVEL_OUTOF10: 0
PAINLEVEL_OUTOF10: 0

## 2022-09-19 NOTE — CARE COORDINATION
9/19/22, 1:05 PM EDT  DISCHARGE PLANNING EVALUATION    SW received a call from Vidhya, nahomy and she reported that she would like a referral to Inpatient rehab. SW notified her that consult was placed for inpatient rehab. CM updated.

## 2022-09-19 NOTE — PROGRESS NOTES
CT/CV Surgery Progress Note    2022 8:00 AM  Surgeon:  Dr. Alexis Little: Mr. Deric Bobby is resting comfortably in bed on 3L, alert, and in no acute distress. Pt denies chest pressure, SOB, fever,chills, N/V/D.    Hgb- 8.9    I/O last 3 completed shifts: In: 7973 [P.O.:960; I.V.:6230.9; Blood:585.5; IV Piggyback:196.6]  Out: 3900 [Urine:3900]    Vital Signs: BP (!) 140/86   Pulse 98   Temp 98.5 °F (36.9 °C) (Oral)   Resp 20   Ht 6' (1.829 m)   Wt 174 lb 3.2 oz (79 kg)   SpO2 93%   BMI 23.63 kg/m²    Temp (24hrs), Av.2 °F (36.8 °C), Min:97.8 °F (36.6 °C), Max:98.5 °F (36.9 °C)    Labs:   CBC:   Recent Labs     22  0559 22  1012 22  2347 22  0618 22  0331   WBC 15.7*  --   --  11.8* 9.4   HGB 7.4*   < > 8.5* 9.1* 8.9*   HCT 22.8*   < > 26.2* 27.6* 27.4*   MCV 90.5  --   --  89.6 90.1     --   --  249 275    < > = values in this interval not displayed. BMP:   Recent Labs     22  0559 22  0618 22  0331   * 138 136   K 3.5 3.6 3.8    104 102   CO2 22* 25 25   BUN 32* 14 10   CREATININE 1.3* 0.9 0.8   MG 2.2  --   --      Imaging:  INFERIOR VENA CAVA FILTER INSERTION /and angioplasty right common iliac vein :  Impression   1. Status post angioplasty origin right common iliac vein with excellent result. 2. Status post successful IVC filter insertion. 3. Patient will be scheduled for follow-up visit in 3 months to discuss possible IVC filter retrieval.               **This report has been created using voice recognition software. It may contain minor errors which are inherent in voice recognition technology. **       Final report electronically signed by Dr. Dolores Mcbride on 2022 5:20 PM       Intake/Output Summary (Last 24 hours) at 2022 0800  Last data filed at 2022 2467  Gross per 24 hour   Intake 6887.51 ml   Output 2150 ml   Net 4737.51 ml       Scheduled Meds:    sodium chloride flush  5-40 mL IntraVENous 2 times per day    cefTRIAXone (ROCEPHIN) IV  1,000 mg IntraVENous Q24H     ROS: All neg unless specifically mentioned in subjective section. Exam:  General Appearance: alert ,conversing, in no acute distress  Cardiovascular: normal rate, regular rhythm, normal S1 and S2, no murmurs, rubs, clicks, or gallops  Pulmonary/Chest: clear to auscultation bilaterally- no wheezes, rales or rhonchi, normal air movement, no respiratory distress  Neurological: alert, oriented, normal speech, no focal findings or movement disorder noted      Assessment:   Patient Active Problem List   Diagnosis    Essential hypertension    Tobacco abuse    History of ETOH abuse    Umbilical hernia without obstruction and without gangrene    Multiple subsegmental pulmonary emboli without acute cor pulmonale (HCC)    Acute bilateral deep vein thrombosis (DVT) of femoral veins (HCC)    Compression of vena cava    AGNES (acute kidney injury) (HCC)    Hyponatremia    Hypokalemia    Leukocytosis    Abnormal CT of the chest    Thrombocytosis    Physical deconditioning    Acute anemia    Hypocalcemia    LV dysfunction    Diastolic dysfunction    Sinus tachycardia     Plan:   IVC filter placed yesterday  Continue medical therapy.     The plan of care was discussed in detail with Dr. Alison Nation     Electronically signed by Miguel Angel Peterson PA-C on 9/19/2022 at 8:00 AM

## 2022-09-19 NOTE — CARE COORDINATION
Collaborative Discharge Planning    Ender Aggarwal  :  1960  MRN:  [id number]    ADMIT DATE:  9/15/2022      Discharge Planning Discharge Planning  Type of Residence: Long-Term Acute Care, Skilled Nursing Facility  Living Arrangements: Spouse/Significant Other  Support Systems: Spouse/Significant Other  Current Services Prior To Admission: None  Potential Assistance Needed: N/A  DME Ordered?: No  Potential Assistance Purchasing Medications: No  Type of Home Care Services: None  Patient expects to be discharged to[de-identified] Skilled nursing facility  Follow Up Appointment: Best Day/Time : Thursday AM  One/Two Story Residence: One story  History of falls?: No  White Board Notes /Social Work Whiteboard Notes  /Social Work Whiteboard: ; SW - Referral to Inpatient Rehab. Precert. Referral to [facility]. Precert. From [facility]. Unsure if he wants to return. Discharge Plan SNF  from [facility]; plans new IPR (precert, therapy following)  Discharge Milestones and Delays: Clinical status  B/L Pulmonary Emboli/BLE DVT  History: Alcohol Use/Smoker, AAA Repair 2 weeks ago [facility])  9/15 IVC/LLE Thromboaspiration/Thrombectomy   Left IVC Stent Placement (compressed IVC)   IVC w Angioplasty    H 8.9; monitor    IV AB, Oxygen 3L, IVF    Await Rehab precert (day 1)      SIGNED:  Juan Pablo Jones RN   2022, 1:08 PM    22, 2:48 PM EDT    Patient goals/plan/ treatment preferences discussed by  and . Patient goals/plan/ treatment preferences reviewed with patient/ family. Patient/ family verbalize understanding of discharge plan and are in agreement with goal/plan/treatment preferences. Understanding was demonstrated using the teach back method.   AVS provided by RN at time of discharge, which includes all necessary medical information pertaining to the patients current course of illness, treatment, post-discharge goals of care, and treatment preferences.      Services At/After Discharge: Inpatient rehab

## 2022-09-19 NOTE — PROGRESS NOTES
[facility]  INPATIENT PHYSICAL THERAPY  EVALUATION  STR ICU STEPDOWN TELEMETRY 4K - 4K-01/001-A    Time In: 4549  Time Out: 1149  Timed Code Treatment Minutes: 15 Minutes  Minutes: 24          Date: 2022  Patient Name: Patel Shaw,  Gender:  male        MRN: [id number]  : 1960  (62 y.o.)      Referring Practitioner: Keon Ma DO  Diagnosis: Swelling  Additional Pertinent Hx: Patel Shaw is a 62 y.o. male with PMHx as noted below who presents to [facility] for evaluation of worsening GUZMAN, tachypnea and lower extremity swelling. Patient recently was discharged from [place] where he was there for 2 weeks for a AAA repair. Patient was discharged to [facility] for further rehab. Reports that he arrived there on the day prior to admission around 2 PM.  On the morning of admission patient states that he tried to walk around and noticed that after just a few feet he would get short of breath. Later in the morning he noticed that his upper left leg started getting sore. He subsequently started developing hyperventilation approximately 1 hour after. EMS was called and patient was brought to the ED for further evaluation. Patient states that he has never had a blood clot before in the past.  On arrival patient was found to be afebrile, tachypnea, tachycardic and hypertensive. He was placed on 3 L of O2 via NC. CTA chest revealed heavy burden of pulmonary emboli within the segmental and subsegmental arteries of all 5 lobes. VL venous duplex ultrasound of the lower extremities revealed a large left DVT with extension from the veins in the calf to the left common femoral vein. There is also a DVT in the right common femoral, proximal superficial femoral and peroneal veins. Placed on heparin gtt.  and immediately transferred to IR where Dr. Kai Grayson performed a thrombectomy of the LLE as well as angioplasty of the IVC  -- Dr. Kai Grayson spoke to this resident over telephone; patient has what appears to be a compressed IVC which was unable to be opened up via angioplasty. Recommends stenting tomorrow morning. Pt s/p thrombectomy 9/16 and IVC 9/18. Restrictions/Precautions:  Restrictions/Precautions: Fall Risk    Subjective:  Chart Reviewed: Yes  Patient assessed for rehabilitation services?: Yes  Family / Caregiver Present: No  Subjective: RN approved session, pt is supine in bed, on 3 L O2, agreeable to PT.     General:  Overall Orientation Status: Within Functional Limits  Vision: Within Functional Limits  Hearing: Within functional limits       Pain: denies    Vitals: Oxygen: upper 90's on 3L at rest and after activity  Heart Rate: 102 at rest, 120's after activity    Social/Functional History:    Lives With: Spouse  Type of Home: House  Home Layout: Two level, Bed/Bath upstairs  Home Access: Stairs to enter with rails  Entrance Stairs - Number of Steps: couple  Home Equipment:  (None)        Ambulation Assistance: Independent  Transfer Assistance: Independent    Active : Yes  Occupation: Full time employment  Type of Occupation:        OBJECTIVE:  Range of Motion:  Bilateral Lower Extremity: WFL    Strength:  Bilateral Lower Extremity: Impaired - grossly 4-/5    Balance:  Static Sitting Balance:  Modified Independent  Dynamic Sitting Balance: Modified Independent  Static Standing Balance: Contact Guard Assistance  Dynamic Standing Balance: Contact Guard Assistance    Bed Mobility:  Supine to Sit: Stand By Assistance, X 1, with head of bed raised, with rail  Sit to Supine: Stand By Assistance, X 1, with head of bed raised, with rail     Transfers:  Sit to Stand: Contact Guard Assistance  Stand to Sit:Contact Guard Assistance    Ambulation:  Contact Guard Assistance  Distance: 20 feet  Surface: Level Tile  Device:No Device  Gait Deviations:  Decreased Step Length Bilaterally, Decreased Gait Speed, and Decreased Terminal Knee Extension  **Pt fatigued after short distance ambulation, requesting to lie back in bed, 's, sats upper 90's on 3 L O2    Exercise:  Patient was guided in 1 set(s) 10 reps of exercise to both lower extremities, while seated EOB. Ankle pumps, Seated marches, and Long arc quads. Exercises were completed for increased independence with functional mobility. Functional Outcome Measures: Completed  AM-PAC Inpatient Mobility without Stair Climbing Raw Score : 15  AM-PAC Inpatient without Stair Climbing T-Scale Score : 43.03    ASSESSMENT:  Activity Tolerance:  Patient tolerance of  treatment: good. Treatment Initiated: Treatment and education initiated within context of evaluation. Evaluation time included review of current medical information, gathering information related to past medical, social and functional history, completion of standardized testing, formal and informal observation of tasks, assessment of data and development of plan of care and goals. Treatment time included skilled education and facilitation of tasks to increase safety and independence with functional mobility for improved independence and quality of life. Assessment: Body Structures, Functions, Activity Limitations Requiring Skilled Therapeutic Intervention: Decreased functional mobility , Decreased endurance, Decreased balance, Decreased strength  Assessment: Pt tolerates session fair, limited by impaired endurance, on 3 L O2 and fatigues after short distance ambulation in room. PT to continue to progress strength and functional mobility. Therapy Prognosis: Excellent    Requires PT Follow-Up: Yes    Discharge Recommendations:  Discharge Recommendations: IP Rehab    Patient Education:      .     Patient Education  Education Given To: Patient  Education Provided: Role of Therapy, Plan of Care  Education Method: Verbal  Barriers to Learning: None  Education Outcome: Verbalized understanding       Equipment

## 2022-09-19 NOTE — PROGRESS NOTES
Comprehensive Nutrition Assessment    Type and Reason for Visit:  Initial, Positive Nutrition Screen (unplanned wt loss, poor po/appetite)    Nutrition Recommendations/Plan:   Continue current diet. .  Consider MVI. Send Ensure Compact TID. Malnutrition Assessment:  Malnutrition Status: Moderate malnutrition (09/19/22 1041)    Context:  Acute Illness     Findings of the 6 clinical characteristics of malnutrition:  Energy Intake:   (Pt had less than75% usual intake atleast 1 week following his repair aortic aneurysm OR (8/29/22)  however po improving to % here)  Weight Loss:   (Pt reports 13# wt loss ~ 13% in ~1 month however no actual wt at that time available. Pinky Angles )     Body Fat Loss:  Mild body fat loss Orbital, Fat Overlying Ribs   Muscle Mass Loss:  Mild muscle mass loss Calf (gastrocnemius), Clavicles (pectoralis & deltoids), Temples (temporalis)  Fluid Accumulation:  Unable to assess     Strength:  Not Performed    Nutrition Assessment:     Pt. moderately malnourished AEB criteria as listed above. At risk for further nutrition compromise r/t admit  with Swelling, HTN, , Umbilical Hernia, Multiple Subsegmental Pulmonary Emboli, Acute Bilateral DVT, Compression of Vena Cavs, AGNES, Hyponatremia, Hypokalemia, Acute Anemia, Hypocalcemia, Acute Blood Loss Anemia, Increased Needs for Wound Healing and underlying medical condition (Hx ETOH Use, Tobacco Abuse, Acute DVT, Acute Pulmonary Embolism  ). Nutrition Related Findings:    Pt. Report/Treatments/Miscellaneous: Pt seen, appears thin, mentions appetite has improved  & reports he consumed more than 75% of his breakfast. Denies chewing/swallowing difficulty. Denies N/V. Pt a  & tends to consume 2 meals/day. Pt had AAA repair (8/29/22) & mentions he started to have unintentional wt loss since that surgery.     GI Status: BM x 2 (9/18)  Pertinent Labs: (9/19) Hemoglobin 8.9, (9/15) ProBNP 1422  Pertinent Meds: Colace, Zofran, Glycolax Wound Type:  (wound femoral anterior;right;mid IVC filter insertion site, incision abdomen mid)       Current Nutrition Intake & Therapies:    Average Meal Intake: %, 51-75%  Average Supplements Intake:  (new)  ADULT DIET; Regular  ADULT ORAL NUTRITION SUPPLEMENT; Breakfast, Lunch, Dinner; Standard 4 oz Oral Supplement    Anthropometric Measures:  Height: 6' (182.9 cm)  Ideal Body Weight (IBW): 178 lbs (81 kg)    Admission Body Weight: 185 lb (83.9 kg) ((9/17) nonpitting LLE edema)  Current Body Weight: 174 lb 3.2 oz (79 kg) ((9/19) edema N/A),   IBW. Current BMI (kg/m2): 23.6  Usual Body Weight:  (per EMR: (9/15) 174#, per pt 200# ~ 1 month ago ( I do not have previous actual wts from a month ago))                       BMI Categories: Normal Weight (BMI 18.5-24. 9)    Estimated Daily Nutrient Needs:  Energy Requirements Based On: Kcal/kg     Energy (kcal/day): 1975-2370kcals (25-30kcals/kgm)     Protein (g/day): 63+ grams as renal function tolerates  (0.8 grams protein/kgm )       Nutrition Diagnosis:   Moderate malnutrition, In context of acute illness or injury related to increase demand for energy/nutrients, inadequate protein-energy intake as evidenced by Criteria as identified in malnutrition assessment    Nutrition Interventions:   Food and/or Nutrient Delivery: Continue Current Diet, Start Oral Nutrition Supplement  Nutrition Education/Counseling: Education initiated (Encouraged lean protein choice with each meal to aid with wound healing.)  Coordination of Nutrition Care: Continue to monitor while inpatient, Interdisciplinary Rounds       Goals:     Goals: PO intake 75% or greater, by next RD assessment       Nutrition Monitoring and Evaluation:   Behavioral-Environmental Outcomes: None Identified  Food/Nutrient Intake Outcomes: Diet Advancement/Tolerance, Food and Nutrient Intake, Supplement Intake  Physical Signs/Symptoms Outcomes: Biochemical Data, GI Status, Fluid Status or Edema, Hemodynamic Status, Nutrition Focused Physical Findings, Skin, Weight    Discharge Planning:     Too soon to determine     Pedro Panchal RD, LD  Contact: [phone]

## 2022-09-19 NOTE — PROGRESS NOTES
PROGRESS NOTE      Patient:  Hansel Green    Unit/Bed:4K-01/001-A    YOB: 1960    MRN: [id number]     Acct: [de-identified]    PCP: WENDI Belle    Date of Admission: 9/15/2022 LOS: 4    Date of Evaluation:  9/19/2022    Anticipated Discharge: To inpatient rehab    Assessment/Plan:    Bilateral Multilobular Pulmonary Embolism and Bilateral Proximal DVTs: Confirmed on CTA chest 9/15/22 and dopplers of BLE 9/15/22 --> PE's  noted to be heavy burden w/in segmental and subsegmental of all 5 lobes but no right heart strain. BP has remained stable, however Hgb dropped to 7.2 over 2 days from 12.8 at admission 9/15/22. Bilateral DVTs are proximal with left > right. Was hospitalized in [place] for 2 weeks for AAA repair (8/29 - 9/13)  -- Patient placed on heparin gtt. and underwent LLE thrombectomy by Dr. Luis Francis 9/15 and then IVC stent placement 9/16 with additional thromboaspiration then 9/18 IVC filter placement  - Echo 9/16/22 w/o RV strain with extensive Pe's, EF 40% with mod global hypokinesis, grade 1 DD  - Heparin gtt started on admission -> stopped 9/18 per CTS for concern of bleeding with drop in h/h 9/17, pt received PRBC x2, s/p IVC filter 9/18  - S/p IVC filter placement 9/18 -> await CTS recs for anticoagulants  - PT/OT rec rehab, PM&R consulted recommending inpatient rehab   - Encourage IS, wean O2 as able --> on 3 L NC as of 9/19/2022   - CBC and BMP daily     Acute likely blood loss Anemia --- worsening 9/17 --  Drop in Hgb from 12.8 on 9/15 to 10.5 on 9/16 to 7.4-7.2 on 9/17.  CTA abd per CTS with findings have not identified an acute bleed 9/16 nor 9/17, however the patient has bilateral retroperitoneal hematomas (POA)  -- ?due to heparin and cont bleeding vs ??loss due to recurrent thromboaspiration of BLE and IVC clots  - Consulted Thoracic surgery --> per Dr. Aldean Sires pt Transfused 2 units PRBC 9/17 and hgb up to 9.1  - 9/19 Hgb 8.9  - Daily CBC  - ?need to r/o GI etiology, will consider FOBT if Hgb drops again  - Transfuse <7  - no signs of hemolysis 9/19     LV dysfunction with diastolic dysfunction --  Given extensive clot history, this may be an acute development, however chronic cardiac dysfunction cannot be excluded entirely. ECHO from 9/16/ss with moderatedly reduced systolic function and EF of 40% as well as grade 1 diastolic dysfunction.  - Monitor on telemetry, stable 9/19  - Consider consult to cardiology      Compression of Distal IVC with bilateral R >L retroperitoneal hematomas -- per IR s/p further declot/thrombectomy and angioplasty of IVC and left innominate vein 9/16 w/o much improvement thus followed by:  -- 9/17 -> Per IR -->  Extensive venous thrombosis both common iliac veins with extension into the inferior vena cava. Status post successful thromboaspiration procedure with significant improvement and s/p agnioplasty of IVC and s/p stenting of IVC restoring good flow lumen caliber to IVC  - 9/18 Per IR, inferior vena cava filter insertion , and angioplasty right common iliac vein, successful with minimal EBL     AGNES, Pre-Renal etiology, resolved: up to 2.1 on admission 9/15 --> FeNa 0.3% 9/15. Recently hospitalized at [facility], s/p AAA rupture repair, ?heavy contrast exposure? --> noted per CTA abd 9/17 of focal infarcts in right kidney -- Cr baseline unknown. - IV NS @ 50 cc/h, continue encouraging PO hydration, will stop 9/20 if stable  - 9/17 got contrast CTA, monitor Cr for rise in few days  - 9/19 Cr improved to 0.8  - Holding home ACE inhibitor   - Daily weights, I/Os, BMP     Leukocytosis, resolved: afeb - CT chest on admission with small amount of tree-in-bud nodularity along the left lung base suspicious for aspiration pneumonia vs PE inflammation. Afebrile. No history of recent cough.  No diarrhea and no acute findings of infxn on CT abd  - Procalcitonin elevation likely due to decreased renal clearance but ??due to pneumonia   - Empiric IV Rocephin started on admission 9/15 x 7 days, will end 9/19 and WBC 9.4 WNL  - u/a 9/15 (-), no BC done on admission  - Daily CBC     Possible pneumonia LLL  -- CTA chest on admission 9/15/22 = Small amount of tree-in-bud nodularity along the left lung base suggestive of an infectious or inflammatory process including aspiration pneumonitis   - PCT elevated 0.54 and WBC up to 24.5 on admission 9/15 thus will treat as infxn with recent extensive admission at [facility] started 9/16-9/19  - 9/19 WBC at 9.4 WNL      Combined Metabolic Alkalosis with Respiratory Alkalosis, improved: Due to AGNES and tachypnea from PE - improving and WNL on 9/19 - monitor     Mild Asymptomatic Hyponatremia, resolved: Etiology likely AGNES and per [facility] records pt was d/c with HCTZ?? -- holding any diuretics and improving with IVF to 136 on 9/19 from 129 on admission 9/15 -- cont IVF 9/19 and will stop if remains stable.   - Daily BMP     Mild Asymptomatic Hyperkalemia 2/2 ACE-I Therapy: K 5.6 on arrival.   - Holding ACE inhibitor   - IVF started on admission  - 9/19 K stable at 3.8  - Daily BMP     Sinus tachycardia -- due to above -monitor tele - ?BB as was on lopressor at d/c per d/c summary at [facility] -- monitor tele     Thrombocytosis -- likely reactive -- platelets stable 1/31 at 275, now off heparin gtt - will monitor     Recent AAA rupture s/p emergent Repair: Performed at [place] 8/29/22 with +cardiac arrest also noted per notes reviewed and had returned to OR 8/31/22 for a washout   - Consulted thoracic surgery with above and apprec assist  - s/p 27 units PRBC, 28 units FFP, 5 cryo, 3 packs plts noted per record review at [facility]  - no active bleeding from aorta per CTA abd 9/17/22 but has residual bilateral RP hematomas  - s/p 2 units PRBC 9/17 per CTS after hgb dropped to 9/17, heparin gtt stopped     Essential HTN -- ?on lisinopril at [facility] - per d/c summary from [facility] pt was dc with metoprolol and HCTZ - hold all meds as BP controlled -- tight control with recent AAA rupture -- ?consider increased metoprolol given episodes of sinus tachycardia? Hx ETOH abuse - LFT WNL - no ETOH since surgery 8/29/22     Tobacco abuse - +smoker prior to AAA rupture -> cont encourage cessation     Deconditioning -- from recent hospitalization at [facility] and currently - was d/c to [facility] from [facility] -> PT/OT c/s and treating-- PMR consulted w/ recs for inpatient rehab. Moderate Malnourishment: Dietary following -  Moderate malnutrition 9/19 due to acute illness, recommends Ensure TID with full adult diet. Chief Complaint: GUZMAN/SOB, tachypnea, leg swelling    Hospital Course: per H&P   \" Anupam Dueñas is a 62 y.o. male with PMHx as noted below who presents to [address] [place] [address] for evaluation of worsening GUZMAN, tachypnea and lower extremity swelling. Patient recently was discharged from [place] where he was there for 2 weeks for a AAA repair. Patient was discharged to [facility] for further rehab. Reports that he arrived there on the day prior to admission around 2 PM.  On the morning of admission patient states that he tried to walk around and noticed that after just a few feet he would get short of breath. Later in the morning he noticed that his upper left leg started getting sore. He subsequently started developing hyperventilation approximately 1 hour after. EMS was called and patient was brought to the ED for further evaluation. Patient states that he has never had a blood clot before in the past.    Per prior progress notes  \"Summarized ED workup and finding  -- On arrival patient was found to be afebrile, tachypnea, tachycardic and hypertensive.   He was placed on 3 L of O2 via NC.  -- Labs revealed a mild hyponatremia, mild hyperkalemia, prerenal AGNES, reactive leukocytosis, reactive thrombocythemia  -- EKG revealed sinus tachycardia  -- CTA chest revealed heavy burden of pulmonary emboli within the segmental and subsegmental arteries of all 5 lobes. VL venous duplex ultrasound of the lower extremities revealed a large left DVT with extension from the veins in the calf to the left common femoral vein. There is also a DVT in the right common femoral, proximal superficial femoral and peroneal veins  --Placed on heparin gtt. and immediately transferred to IR where Dr. TIFFANY David performed a thrombectomy of the LLE as well as angioplasty of the IVC  -- Dr. 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 Recommends stenting tomorrow morning     Pertinent/Relevant History  -- Patient was recently discharged from [facility] for a AAA repair  -- He was then transferred to [facility] for rehab where on the day before admission he had the following events occur in chronologic order: proximal left leg pain, left leg swelling, GUZMAN and then tachypnea\"     9/16 - CT abd pelvis w/o contrast this AM, Bilateral retroperitoneal hematoma R>L which may relate to prior AAA, and IVC compressed distally by right retroperitoneal hematoma. ICV stent today, successful. Leukocytosis improving today, will follow, empiric Rocephin. NS at 125 ml/hr perioperatively from IR stenting, will begin weaning tomorrow with full diet. Cr improving to 1.8, Na to 131, K WNL at 4.5.     9/17 - Hemoglobin dropped this AM from 10.5 to 7.4, repeat 7.2. Consulted Vascular surgery. Patient was consented for blood products and transfused, H&H trends ordered. Abd CTA w/wo contrast taken, revealed stable post-surgical changes compared to CT 9/16. Will will continue the heparin gtt despite drop in Hgb of unclear etiology given his extensive clotting with bilateral PE and bilateral DVTs. IR took out R groin slip knot, PT/OT consulted for tomorrow morning. Cr improving to 1.3, Na improving to 132, K WNL at 3.5. \"    \"9/18/2022  --> pt w/o new c/o - denies any cp.   No abd pain. C/o some numbness and tingling in right leg but no pain. Felix po. No n/v. Denies f/c. On 3L O2. Afebrile. Hasn't gotten OOB yet. Last BM 9/18 - -2.2 L since admission\"    Plan of Care 9/18 per Hospitalist -- \"d/w Dr. Deven Knight concern for bleeding on heparin gtt with drop in h/h yesterday to 7.2 from 12.8 on 9/15 and 10.5 on 9/16 --> he stopped heparin gtt and CTA abd/pelvis done and cont with RP hematoma (present on admission) but with tracking to psoas muscles bilaterally R>L. Thus Dr. Deven Knight recommending IVC filter as plan to continue to hold heparin at this time - d/w Dr. Green IR as pt with recent IVC stent placement - he will place IVC filter today and then will await CTS recs when feel comfortable resuming heparin/anticoagulation given pt's extensive DVT's and Pe's. Cont monitor h/h. Procedure d/w pt and he is agreeable. Further risks of procedure to be discussed with pt by Dr. Green. \" Per Op note \" inferior vena cava filter insertion , and angioplasty right common iliac vein, successful, minimal blood loss, no immediate complications\"    0/28 - S/p IVC filter placement 9/18. He received 2 units of blood 9/17 and his Hbg remains stable today at 8.9. We are holding anticoagulation therapy at this time due to concern for bleeding. Daily BMP and CBC in AM. Consulted PM&R as PT/OT recommend inpatient rehab. Dietary recommends supplemental ensure due to moderate malnourishment. Pt set to complete final day of Rocephin today. CTS and PM&R following. Will stop continuous NS tomorrow if condition remains stable and PO intake is good given moderate malnutrition and recent AGNES/contrast.    Subjective/HPI:   Thea Romero feels fair overall. He denies HA, worsened SOB, CP, N/V/D, calf pain, increased leg swelling. He has not had any issues eating, nor urinating. He has no new complaints. He is comfortable and conversational on 3L of NC O2.       PMH, SURGICAL HX, FH, SOCIAL HX reviewed and updated as needed. Medications:  Reviewed    Infusion Medications    sodium chloride      sodium chloride      sodium chloride      sodium chloride 50 mL/hr at 09/19/22 1668     Scheduled Medications    sodium chloride flush  5-40 mL IntraVENous 2 times per day    cefTRIAXone (ROCEPHIN) IV  1,000 mg IntraVENous Q24H     PRN Meds: sodium chloride, sodium chloride, potassium chloride **OR** potassium alternative oral replacement **OR** potassium chloride, magnesium sulfate, sodium chloride flush, sodium chloride, ondansetron **OR** ondansetron, polyethylene glycol, acetaminophen **OR** acetaminophen, docusate sodium, morphine **OR** morphine      Intake/Output Summary (Last 24 hours) at 9/19/2022 1433  Last data filed at 9/19/2022 1343  Gross per 24 hour   Intake 7127.51 ml   Output 1550 ml   Net 5577.51 ml       Exam:  /83   Pulse 95   Temp 98 °F (36.7 °C) (Oral)   Resp 18   Ht 6' (1.829 m)   Wt 174 lb 3.2 oz (79 kg)   SpO2 96%   BMI 23.63 kg/m²     Physical Exam  Vitals and nursing note reviewed. Constitutional:       General: He is not in acute distress. Appearance: Normal appearance. He is not ill-appearing, toxic-appearing or diaphoretic. HENT:      Head: Normocephalic and atraumatic. Nose: Nose normal. No rhinorrhea. Mouth/Throat:      Mouth: Mucous membranes are moist.      Pharynx: Oropharynx is clear. Eyes:      General: No scleral icterus. Extraocular Movements: Extraocular movements intact. Conjunctiva/sclera: Conjunctivae normal.   Cardiovascular:      Rate and Rhythm: Normal rate and regular rhythm. Pulses: Normal pulses. Heart sounds: Normal heart sounds. No murmur heard. No friction rub. No gallop. Pulmonary:      Effort: Pulmonary effort is normal. No respiratory distress. Breath sounds: Decreased breath sounds present. No wheezing, rhonchi or rales. Abdominal:      General: Abdomen is flat. Bowel sounds are normal. There is no distension. Palpations: Abdomen is soft. There is no mass. Tenderness: There is no abdominal tenderness. Musculoskeletal:         General: No swelling, tenderness, deformity or signs of injury. Normal range of motion. Cervical back: Normal range of motion. No rigidity or tenderness. Skin:     General: Skin is warm and dry. Capillary Refill: Capillary refill takes less than 2 seconds. Coloration: Skin is not jaundiced or pale. Findings: No bruising or rash. Neurological:      General: No focal deficit present. Mental Status: He is alert and oriented to person, place, and time. Motor: Weakness present. Coordination: Coordination normal.   Psychiatric:         Mood and Affect: Mood normal.         Behavior: Behavior normal.         Thought Content: Thought content normal.         Judgment: Judgment normal.      All labs reviewed and interpreted by me:  Labs:   Recent Labs     09/17/22  0559 09/17/22  1012 09/17/22  2347 09/18/22  0618 09/19/22  0331   WBC 15.7*  --   --  11.8* 9.4   HGB 7.4*   < > 8.5* 9.1* 8.9*   HCT 22.8*   < > 26.2* 27.6* 27.4*     --   --  249 275    < > = values in this interval not displayed. Recent Labs     09/17/22  0559 09/18/22  0618 09/19/22  0331   * 138 136   K 3.5 3.6 3.8    104 102   CO2 22* 25 25   BUN 32* 14 10   CREATININE 1.3* 0.9 0.8   CALCIUM 7.6* 8.2* 8.0*     No results for input(s): AST, ALT, BILIDIR, BILITOT, ALKPHOS in the last 72 hours. No results for input(s): INR in the last 72 hours. No results for input(s): Sonja Fuchs in the last 72 hours.     Urinalysis:      Lab Results   Component Value Date/Time    NITRU NEGATIVE 09/15/2022 08:50 PM    WBCUA 0-2 09/15/2022 08:50 PM    BACTERIA NONE SEEN 09/15/2022 08:50 PM    RBCUA 5-10 09/15/2022 08:50 PM    BLOODU TRACE 09/15/2022 08:50 PM    GLUCOSEU NEGATIVE 09/15/2022 08:50 PM       All radiology images and reports reviewed and interpreted by me:  Radiology:  IR GUIDED IVC FILTER PLACEMENT   Final Result   1. Status post angioplasty origin right common iliac vein with excellent result. 2. Status post successful IVC filter insertion. 3. Patient will be scheduled for follow-up visit in 3 months to discuss possible IVC filter retrieval.            **This report has been created using voice recognition software. It may contain minor errors which are inherent in voice recognition technology. **      Final report electronically signed by Dr. Sonido Gonzalez on 9/18/2022 5:20 PM      CTA ABDOMEN PELVIS W WO CONTRAST   Final Result   1. Postop changes in the abdomen related to aortobifemoral bypass graft insertion. Extensive intra-abdominal soft tissue stranding and chronic hematoma at the operative site and tracking into the pelvis along the psoas muscles bilaterally, worse on the    right. 2. Focal infarcts in the right kidney, unchanged from yesterday. 3. Findings of impending diarrhea. Stent in the inferior vena cava. Hydropic gallbladder. **This report has been created using voice recognition software. It may contain minor errors which are inherent in voice recognition technology. **      Final report electronically signed by Dr. Sonido Gonzalez on 9/17/2022 3:14 PM      IR TRANSCATH PLACE INTRAVASC STENT OPEN/PERC W OR WO ANGIO EACH ADDL VEIN   Final Result   1. Extensive venous thrombosis both common iliac veins with extension into the inferior vena cava. Status post successful thromboaspiration procedure with significant improvement. .   2. Status post angioplasty of the inferior vena cava with suboptimal result. Status post successful stenting of the inferior vena cava restoring good flow lumen caliber to the IVC. **This report has been created using voice recognition software. It may contain minor errors which are inherent in voice recognition technology. **      Final report electronically signed by Dr. Sonido Gonzalez on 9/16/2022 5:21 PM CT ABDOMEN PELVIS WO CONTRAST Additional Contrast? None   Final Result   1. There is appearance of the aortobifemoral graft placement. 2. Large right retroperitoneal hematoma. Small left retroperitoneal hematoma. These are felt to relate to prior abdominal aortic aneurysm. 3. The inferior vena cava is compressed distally by the large right retroperitoneal hematoma. **This report has been created using voice recognition software. It may contain minor errors which are inherent in voice recognition technology. **      Final report electronically signed by Dr Claudia Bae on 9/16/2022 10:29 AM      IR GUIDED [facility] VEIN   Final Result   1. Extensive venous thrombosis. 2. Status post successful declot/thrombectomy procedure, as outlined above. 3. Marked narrowing of the inferior vena cava, appears related to external compression. Angioplasty of the inferior vena cava and left innominate vein was performed, unfortunately, with no significant improvement. **This report has been created using voice recognition software. It may contain minor errors which are inherent in voice recognition technology. **         Final report electronically signed by Dr. Margareth Batista on 9/16/2022 11:31 AM      VL DUP LOWER EXTREMITY VENOUS BILATERAL   Final Result   1. Large acute deep venous thrombosis extending from the left common    femoral vein through the visualized veins of the calf. 2. Acute deep venous thrombosis present within the right common femoral,    proximal superficial femoral and peroneal veins. This document has been electronically signed by: Malachi Moran MD on    09/15/2022 08:28 PM      Technique Used: Duplex examination performed utilizing grayscale, color    and spectral analysis. CTA Chest W WO Contrast   Final Result   1. Heavy burden pulmonary emboli within the segmental and subsegmental    arteries of all 5 lobes. No evidence of right heart strain.    2. Small amount of tree-in-bud nodularity along the left lung base    suggestive of an infectious or inflammatory process including aspiration    pneumonitis. This document has been electronically signed by: Salo Harper MD on    09/15/2022 07:07 PM      All CTs at this facility use dose modulation techniques and iterative    reconstructions, and/or weight-based dosing   when appropriate to reduce radiation to a low as reasonably achievable. 3D Post-processing was performed on this study. XR CHEST PORTABLE   Final Result   1. No acute cardiopulmonary abnormality. This document has been electronically signed by: Salo Harper MD on    09/15/2022 06:33 PM      IR  [address]    (Results Pending)   VL DUP LOWER EXTREMITY VENOUS BILATERAL    (Results Pending)       Diet: ADULT DIET; Regular  ADULT ORAL NUTRITION SUPPLEMENT; Breakfast, Lunch, Dinner; Standard 4 oz Oral Supplement    Moody: no    Microbiology: no    Telemetry Review of past 24 hours:  NSR with few episodes of mild tachycardia to ~105 bmp    DVT prophylaxis: [] Lovenox                                 [] SCDs                                 [] SQ Heparin                                 [x] Encourage ambulation           [] Already on Anticoagulation    (No anti-coagulation at this time, no SCDs on puncture sites)     Disposition:    [] Home       [] TCU       [x] Inpatient Rehab       [] Psych       [] SNF       [] [place]       [] Other-    Code Status: Full Code    PT/OT Eval Status:  Following      Electronically signed by Karen Damon DO on 9/19/2022 at 2:33 PM

## 2022-09-19 NOTE — PROGRESS NOTES
Bob COORDINATOR CONSULT    Referral Type: internal    Patient Name: Yanet Huber      MRN: [id number]    : 1960  (62 y.o.)  Gender: male   Race:White (non-)     Payor Source: Payor: Dorothy Rae / Plan: BCBS - [place] PPO / Product Type: *No Product type* /   Secondary Payor Source:      Isolation Status: No active isolations    Lives With: Spouse  Type of Home: House  Home Layout: Two level, Bed/Bath upstairs  Home Access: Stairs to enter with rails  Entrance Stairs - Number of Steps: couple     Occupation: Full time employment  Type of Occupation:   Additional Comments: Prior to hospitalization and AAA repair on 22, pt was living at home with wife, independent with all tasks and working FT. Pt was discharged from hospital to [facility] for continued rehab. What is treatment plan? Disciplines Required upon Admission to Inpatient Rehabilitation: Physical Therapy and Occupational Therapy  Post operative: No  Fall: No  Dialysis: No  Diet: ADULT DIET;  Regular  ADULT ORAL NUTRITION SUPPLEMENT; Breakfast, Lunch, Dinner; Standard 4 oz Oral Supplement  Discussed patient with  and PM&R provider: Patient is appropriate for IPR

## 2022-09-19 NOTE — PLAN OF CARE
Problem: Discharge Planning  Goal: Discharge to home or other facility with appropriate resources  9/19/2022 1057 by Sophie Tillman RN  Outcome: Progressing  Flowsheets (Taken 9/19/2022 1000)  Discharge to home or other facility with appropriate resources:   Identify barriers to discharge with patient and caregiver   Arrange for needed discharge resources and transportation as appropriate   Identify discharge learning needs (meds, wound care, etc)   Refer to discharge planning if patient needs post-hospital services based on physician order or complex needs related to functional status, cognitive ability or social support system  9/19/2022 0127 by Aung Chino RN  Outcome: Progressing  Flowsheets (Taken 9/18/2022 1952)  Discharge to home or other facility with appropriate resources: Identify barriers to discharge with patient and caregiver     Problem: Pain  Goal: Verbalizes/displays adequate comfort level or baseline comfort level  9/19/2022 1057 by Sophie Tillman RN  Outcome: Progressing  9/19/2022 0127 by Aung Chino RN  Outcome: Progressing  Flowsheets (Taken 9/19/2022 0127)  Verbalizes/displays adequate comfort level or baseline comfort level: Encourage patient to monitor pain and request assistance  Note: Pain Assessment: 0-10  Pain Level: 0   Patient's Stated Pain Goal: 0 - No pain   Is pain goal met at this time? Yes             Problem: Safety - Adult  Goal: Free from fall injury  9/19/2022 1057 by Sophie Tillman RN  Outcome: Progressing  9/19/2022 0127 by Aung Chino RN  Outcome: Progressing  Flowsheets (Taken 9/19/2022 0127)  Free From Fall Injury:   Instruct family/caregiver on patient safety   Based on caregiver fall risk screen, instruct family/caregiver to ask for assistance with transferring infant if caregiver noted to have fall risk factors  Note: No falls this shift.      Problem: Chronic Conditions and Co-morbidities  Goal: Patient's chronic conditions and co-morbidity symptoms are monitored and maintained or improved  9/19/2022 1057 by Lalo Pritchett RN  Outcome: Progressing  Flowsheets (Taken 9/19/2022 1000)  Care Plan - Patient's Chronic Conditions and Co-Morbidity Symptoms are Monitored and Maintained or Improved:   Monitor and assess patient's chronic conditions and comorbid symptoms for stability, deterioration, or improvement   Collaborate with multidisciplinary team to address chronic and comorbid conditions and prevent exacerbation or deterioration   Update acute care plan with appropriate goals if chronic or comorbid symptoms are exacerbated and prevent overall improvement and discharge  9/19/2022 0127 by Lali Chapa RN  Outcome: Progressing  Flowsheets (Taken 9/18/2022 1952)  Care Plan - Patient's Chronic Conditions and Co-Morbidity Symptoms are Monitored and Maintained or Improved: Monitor and assess patient's chronic conditions and comorbid symptoms for stability, deterioration, or improvement     Problem: ABCDS Injury Assessment  Goal: Absence of physical injury  9/19/2022 1057 by Lalo Pritchett RN  Outcome: Progressing  9/19/2022 0127 by Lali Chapa RN  Outcome: Progressing  Flowsheets (Taken 9/19/2022 0127)  Absence of Physical Injury: Implement safety measures based on patient assessment    Care plan reviewed with patient and wife. Patient and wife verbalize understanding of the plan of care and contribute to goal setting.

## 2022-09-19 NOTE — PROGRESS NOTES
[facility] ICU STEPDOWN TELEMETRY 4K  EVALUATION    Time:   Time In: 3049  Time Out: 1058  Timed Code Treatment Minutes: 17 Minutes  Minutes: 29          Date: 2022  Patient Name: Rachel George,   Gender: male      MRN: [id number]  : 1960  (62 y.o.)  Referring Practitioner: Samy Butler DO  Diagnosis: swelling  Additional Pertinent Hx: Rachel George is a 62 y.o. male who presents to the emergency department for evaluation of shortness of breath and left upper leg pain. The patient states that he recently had a abdominal aortic aneurysm repair at the end of August at [facility].  While at rehab today, he developed sudden onset left upper leg pain followed by shortness of breath. His left lower extremity is noticeably more swollen than the right and appears darkly erythematous and mottled. He is currently complaining of shortness of breath. Found to have extensive BLE DVT's and multifocal Pe's -- s/p LLE thromboaspiration by IR on admission -- found to have compression of IVC. CT abd found to have aortobifem graft, large right RP hematoma and small on left, IVC compressed distally by large right RP hematoma. Pt s/p IVC filter placement on 22    Restrictions/Precautions:  Restrictions/Precautions: Fall Risk    Subjective  Chart Reviewed: Yes, Orders, Progress Notes, History and Physical  Patient assessed for rehabilitation services?: Yes  Family / Caregiver Present: No    Subjective: Pt seated in bed upon arrival, agreeable to OT session.     Pain: 0/10:     Vitals: Oxygen: 95% on 3L O2 via NC (no home O2 prior)  Heart Rate: 100 bpm  All vitals remained stable throughout    Social/Functional History:  Lives With: Spouse  Type of Home: House  Home Layout: Two level, Bed/Bath upstairs  Home Access: Stairs to enter with rails  Entrance Stairs - Number of Steps: couple  Home Equipment:  (None)           ADL Assistance: Independent  Homemaking Assistance: Independent  Ambulation Assistance: Independent  Transfer Assistance: Independent    Active : Yes  Occupation: Full time employment  Type of Occupation:   Additional Comments: Prior to hospitalization and AAA repair on 8/29/22, pt was living at home with wife, independent with all tasks and working FT. Pt was discharged from hospital to [facility] for continued rehab. VISION:Corrected    HEARING:  WFL    COGNITION: WFL    RANGE OF MOTION:  Bilateral Upper Extremity:  WFL    STRENGTH:  Bilateral Upper Extremity:  Impaired - grossly 4/5    ADL:   No ADL's completed this session. .-pt reported completed all earlier this date    BALANCE:  Sitting Balance:  Supervision. Standing Balance: Contact Guard Assistance. BED MOBILITY:  Supine to Sit: Stand By Assistance    Sit to Supine: Stand By Assistance    Scooting: Stand By Assistance      TRANSFERS:  Sit to Stand:  [employer], with increased time for completion, cues for hand placement. Stand to Sit: Stand By Assistance. FUNCTIONAL MOBILITY:  Assistive Device: None  Assist Level:  Contact Guard Assistance. Distance:  to/from door of room  Quickly fatigues, requires cues for pursed lip breathing. Exercise:  Pt completed BUE minimal resistance theraband exercises while seated at EOB. Pt completed 1 set X 10 repetitions for shoulder flex/ext, horizontal ABD/ADD, elbow flex/ext and punrches with short rest breaks in between variations. Pt requesting to terminate exercises then due to fatigue, unable to tolerate remaining exercises. Exercises completed to increase overall strength/endurance needed for ADLs and transfers. Pt provided with BUE HEP handout and encouraged to complete throughout day as able to tolerate. Activity Tolerance:  Patient tolerance of  treatment: good.         Assessment:  Assessment: Pt presents requiring increased assistance for ADLs, transfers, and functional mobility compared to PLOF. Pt will continue to benefit from OT services to improve independence with these tasks, in addition to overall strength/endurance to facilitate return to PLOF. Performance deficits / Impairments: Decreased functional mobility , Decreased ADL status, Decreased strength, Decreased endurance, Decreased balance, Decreased high-level IADLs  Prognosis: Good  REQUIRES OT FOLLOW-UP: Yes  Decision Making: High Complexity    Treatment Initiated: Treatment and education initiated within context of evaluation. Evaluation time included review of current medical information, gathering information related to past medical, social and functional history, completion of standardized testing, formal and informal observation of tasks, assessment of data and development of plan of care and goals. Treatment time included skilled education and facilitation of tasks to increase safety and independence with ADL's for improved functional independence and quality of life. Discharge Recommendations:  IP Rehab, Continue to assess pending progress    Patient Education:     Patient Education  Education Given To: Patient  Education Provided: Role of Therapy, Plan of Care, Home Exercise Program (increasing activity)  Education Method: Demonstration, Verbal  Barriers to Learning: None  Education Outcome: Verbalized understanding, Demonstrated understanding    Equipment Recommendations:  Equipment Needed: No  Other: defer to next level of care    Plan:  Times per Week: 5x  Current Treatment Recommendations: Strengthening, Balance training, Functional mobility training, Endurance training, Patient/Caregiver education & training, Equipment evaluation, education, & procurement, Self-Care / ADL, Home management training. See long-term goal time frame for expected duration of plan of care. If no long-term goals established, a short length of stay is anticipated.     Goals:     Short Term Goals  Time Frame for Short term goals: by discharge  Short Term Goal 1: Pt will increase activity tolerance for functional mobility to/from bathroom, progressing to household distances, with supervision and Sp02 >=90% in prep for ADL completion. Short Term Goal 2: Pt will complete BADL tasks with supervision, incorporating ECT prn, to increase independence and ease with self care tasks. Short Term Goal 3: Pt will tolerate dynamic standing X5 minutes with supervision in prep for sinkside grooming tasks. Following session, patient left in safe position with all fall risk precautions in place.

## 2022-09-19 NOTE — CONSULTS
Physical Medicine & Rehabilitation Consultation Note      Admitting Physician: Alexa lOiver MD    Primary Care Provider: WENDI Belle     Reason for Consult: Deconditioning, rehab needs    History of Present Illness:  Hansel Green is a 62 y.o. right-handed  male with a history of hypertension, alcoholic abuse, neck infection, status post umbilical hernia repair, status post recent abdominal aortic aneurysm repair, was admitted to [facility] on 9/15/2022 for bilateral lower extremity DVT and extensive pulmonary embolism. The patient says he seek medical attention on 8/29/2022 because he suddenly \"not feeling well\" and was [facility] to [place].  He was found to have abdominal aortic aneurysm and underwent abdominal aortic aneurysm repair. He then was discharged to [facility] for rehabilitation treatment on 9/14/2022. On 9/15/2022 morning he started having increasing shortness of breath after he walked for few steps. He also developed left lower extremity swelling with left upper thigh soreness pain. His shortness of breath quickly worsened with hyperventilation and tachypnea. Therefore EMS was called and the patient was sent to [facility] ER for evaluation. Her oxygen saturation was 98% on 3 L/min oxygen via nasal cannulae but he continued complaining shortness of breath. CTA of the chest done on 9/15/2022 revealed heavy burden pulmonary emboli within the segmental and subsegmental arteries of all 5 lobes. Bilateral lower extremities venous Doppler study done on 9/15/2022 also revealed large acute DVT extending from left common femoral vein through the visualized veins of calf, and acute DVT within right common femoral, proximal superficial femoral and peroneal veins. The patient was started on heparin drip.   On 9/15/2022 the patient underwent thromboaspiration of the left leg veins, iliac vein and IVC, and angioplasty of IVC for marked IVC compression by IR Dr. Viola Huang. CT of abdomen and pelvis done on 9/16/2022 revealed placed aortobifemoral graft, large right retroperitoneal hematoma compressing inferior vena cava distally and a small left retroperitoneal hematoma. Therefore IR  performed IVC stenting angioplasty, and thrombectomy on 9/16/2022. CTA of abdomen and pelvis done on 9/17/2022 revealed extensive intra-abdominal soft tissue stranding and chronic hematoma at operative site tracking into pelvis along the psoas muscles bilaterally worse on the right. Because of drop of hemoglobin/hematocrit from 10.5/32 on 9/16/2022 to 7.4/22.8 on 9/17/2022, heparin drip was held starting on 9/17/2022 due to concern of bleeding causing dropping hemoglobin and hematocrit. IVC filter therefore was recommended. The patient then underwent IVC filter placement with right common iliac vein angioplasty performed by IR Dr. Kathy Amaro on 9/18/2022. At the present time the patient says he still experiences shortness of breath with exertion but not at rest.  He says he has generalized weakness. He says his right lower extremity feels numb from the knee downward. He says the pain at left thigh has resolved after the procedures. The left lower extremity swelling has improved. He denies having dizziness, lightheadedness, chest pain, abdominal pain, neck pain, back pain, urinary or bowel incontinence. Most Recent Rehabilitation Assessments:  PT:    (9/19/2022) :   Timed Code Treatment Minutes: 15 Minutes  Activity Tolerance:  Patient tolerance of  treatment: good    Balance:  Static Sitting Balance:  Modified Independent  Dynamic Sitting Balance: Modified Independent  Static Standing Balance: Contact Guard Assistance  Dynamic Standing Balance: Contact Guard Assistance     Bed Mobility:  Supine to Sit: Stand By Assistance, X 1, with head of bed raised, with rail  Sit to Supine: Stand By Assistance, X 1, with head of bed raised, with rail      Transfers:  Sit to Stand: Contact Guard Assistance  Stand to [employer] Assistance     Ambulation:  Contact Guard Assistance  Distance: 20 feet  Surface: Level Tile  Device:No Device  Gait Deviations:  Decreased Step Length Bilaterally, Decreased Gait Speed, and Decreased Terminal Knee Extension  **Pt fatigued after short distance ambulation, requesting to lie back in bed, 's, sats upper 90's on 3 L O2      OT:    (9/19/2022) : Timed Code Treatment Minutes: 17 Minutes  Activity Tolerance:  Patient tolerance of  treatment: good. ADL:   No ADL's completed this session. .-pt reported completed all earlier this date     BALANCE:  Sitting Balance:  Supervision. Standing Balance: Contact Guard Assistance. BED MOBILITY:  Supine to Sit: Stand By Assistance    Sit to Supine: Stand By Assistance    Scooting: Stand By Assistance       TRANSFERS:  Sit to Stand:  [address], with increased time for completion, cues for hand placement. Stand to Sit: Stand By Assistance. FUNCTIONAL MOBILITY:  Assistive Device: None  Assist Level:  Contact Guard Assistance. Distance:  to/from door of room  Quickly fatigues, requires cues for pursed lip breathing.        ST:  Not requested      Past Medical History:        Diagnosis Date    Acute deep vein thrombosis (DVT) of proximal vein of both lower extremities ([address] 09/15/2022    Acute pulmonary embolism without acute cor pulmonale ([address] 09/15/2022    bilateral, multilobular    History of ETOH abuse 10/19/2016    Hypertension 2007    Neck infection 2012    Pre-op evaluation: prior to abdominal hernia surgery 10/19/2016    Tobacco abuse 10/19/2016       Past Surgical History:        Procedure Laterality Date    ABDOMINAL AORTIC ANEURYSM REPAIR      IR IVC FILTER PLACEMENT W IMAGING  9/18/2022    IR IVC FILTER PLACEMENT W IMAGING 9/18/2022 [facility]    IR THROMB [address]  9/15/2022    IR THROMB [address] 9/15/2022 [facility]    UMBILICAL HERNIA REPAIR  11/22/2016    Mesh       Allergies:     Allergies   Allergen Reactions    Vancomycin Itching        Current Medications:   Current Facility-Administered Medications   Medication Dose Route Frequency Provider Last Rate Last Admin    0.9 % sodium chloride infusion   IntraVENous PRN Rufino Malagon MD        0.9 % sodium chloride infusion   IntraVENous PRN Rufino Malagon MD        potassium chloride (KLOR-CON M) extended release tablet 40 mEq  40 mEq Oral PRN Joanie Shaffer MD        Or    potassium bicarb-citric acid (EFFER-K) effervescent tablet 40 mEq  40 mEq Oral PRN Joanie Shaffer MD        Or    potassium chloride 10 mEq/100 mL IVPB (Peripheral Line)  10 mEq IntraVENous PRN Joanie Shaffer MD        magnesium sulfate 2000 mg in 50 mL IVPB premix  2,000 mg IntraVENous PRN Joanie Shaffer MD        sodium chloride flush 0.9 % injection 5-40 mL  5-40 mL IntraVENous 2 times per day Joanie Shaffer MD   10 mL at 09/18/22 0944    sodium chloride flush 0.9 % injection 5-40 mL  5-40 mL IntraVENous PRN Joanie Shaffer MD        0.9 % sodium chloride infusion   IntraVENous PRN Joanie Shaffer MD        ondansetron (ZOFRAN-ODT) disintegrating tablet 4 mg  4 mg Oral Q8H PRN Joanie Shaffer MD   4 mg at 09/16/22 2155    Or    ondansetron (ZOFRAN) injection 4 mg  4 mg IntraVENous Q6H PRN Joanie Shaffer MD        polyethylene glycol (GLYCOLAX) packet 17 g  17 g Oral Daily PRN Joanie Shaffer MD        acetaminophen (TYLENOL) tablet 650 mg  650 mg Oral Q6H PRN Malachi Donohue MD   650 mg at 09/17/22 2108    Or    acetaminophen (TYLENOL) suppository 650 mg  650 mg Rectal Q6H PRN Malachi Donohue MD        cefTRIAXone (ROCEPHIN) 1,000 mg in dextrose 5 % 50 mL IVPB mini-bag  1,000 mg IntraVENous Q24H Joanie Shaffer MD   Stopped at 09/18/22 2322    docusate sodium (COLACE) capsule 100 mg  100 mg Oral BID PRN Malachi Donohue MD        0.9 % sodium chloride infusion   IntraVENous Continuous William Carrillo, DO 50 mL/hr at 09/19/22 9507 Rate Verify at 09/19/22 0493    morphine (PF) injection 2 mg  2 mg IntraVENous Q2H PRN Geni Prince MD        Or    morphine injection 4 mg  4 mg IntraVENous Q2H PRN Geni Prince MD             Social History:  Social History     Socioeconomic History    Marital status:      Spouse name: Not on file    Number of children: Not on file    Years of education: Not on file    Highest education level: Not on file   Occupational History    Not on file   Tobacco Use    Smoking status: Heavy Smoker     Packs/day: 1.50     Types: Cigarettes     Start date: 0    Smokeless tobacco: Never   Substance and Sexual Activity    Alcohol use: Yes     Alcohol/week: 18.0 - 24.0 standard drinks     Types: 18 - 24 Cans of beer per week     Comment: social ; drinking 6 to 8 cans of beer 3 times weekly    Drug use: Not Currently     Types: Marijuana Lissette Mustard)    Sexual activity: Not on file   Other Topics Concern    Not on file   Social History Narrative    Not on file     Social Determinants of Health     Financial Resource Strain: Not on file   Food Insecurity: Not on file   Transportation Needs: Not on file   Physical Activity: Not on file   Stress: Not on file   Social Connections: Not on file   Intimate Partner Violence: Not on file   Housing Stability: Not on file     Occupation: Working as a ; last worked on 8/28/2022  Lives with: His wife  Home setup: Two-level plus basement house with 2 steps outside front door with bilateral handrails; his bedroom is on the second floor; there are full bathroom on the first and second floors  Previous level of independence: Independent in all ADLs, community ambulation without using any assistive walking device, and driving      Family History:       Problem Relation Age of Onset    Lung Cancer Mother        Review of Systems:  Review of Systems   Constitutional:  Negative for chills, diaphoresis, fatigue and fever. HENT:  Negative for ear discharge, ear pain, hearing loss, rhinorrhea, sneezing, sore throat, tinnitus and trouble swallowing. Eyes:  Negative for pain, discharge and visual disturbance. Respiratory:  Positive for shortness of breath. Negative for cough and wheezing. Cardiovascular:  Negative for chest pain, palpitations and leg swelling. Gastrointestinal:  Negative for abdominal pain, constipation, diarrhea, nausea and vomiting. Endocrine: Negative for cold intolerance and heat intolerance. Genitourinary:  Negative for difficulty urinating and dysuria. Musculoskeletal:  Positive for gait problem. Negative for arthralgias, back pain, myalgias and neck pain. Skin:  Negative for rash. Allergic/Immunologic: Negative for food allergies. Neurological:  Positive for weakness (Generalized) and numbness (Right lower extremity below knee). Negative for dizziness, tremors, facial asymmetry, speech difficulty, light-headedness and headaches. Hematological:  Does not bruise/bleed easily. Psychiatric/Behavioral:  Negative for dysphoric mood, hallucinations and sleep disturbance. The patient is not nervous/anxious.         Physical Exam:  /83   Pulse 95   Temp 98 °F (36.7 °C) (Oral)   Resp 18   Ht 6' (1.829 m)   Wt 174 lb 3.2 oz (79 kg)   SpO2 96%   BMI 23.63 kg/m²   Physical Exam  General:  well-developed, well nourished  male; in no acute distress ; appropriate affect & mood; lying on bed comfortably; receiving oxygen supplement via nasal cannulae at 3 L/min right  Eyes: pupil equally round ; extra-ocular motion intact bilaterally  Head, Ear, Nose, Mouth & Throat : normocephalic ; no tenderness at the face or his scalp; no discharge from ears or nose ; no deformity ; no facial swelling ; oral mucosa pink   Neck :  supple ; no tenderness ; no muscle spasm  Cardiovascular : regular rate & rhythm ; normal S1 & S2 heart sound ; no murmur ; normal peripheral pulse at the bilateral upper and lower extremities  Pulmonary : Breath sounds present at bilateral lung fields; no wheezing ; no rale; no crackle  Gastrointestinal : soft, flat abdomen without tenderness ; presence of newly healed vertical surgical scar at the midline with skin staples in place; normal bowel sound present   Back : no tenderness; no muscle spasm  Skin: no skin lesion or rash ; no pitting edema at all 4 extremities; a bandage at left upper inner leg  Musculoskeletal : no limb asymmetry; no limb deformity; no tenderness at bilateral upper & lower extremities; no palpable mass at limbs ; no joints laxity or crepitation ; normal functional joints ROM at bilateral upper & lower extremities  Cerebral :  alert ; awake ; oriented to place, person and time; follow two-step verbal command; able to recall 3/3 items given immediately and 3/3 items about 3 minutes later; able to repeat series of 5 single digit numbers in right order forward but not backward; abstract thinking intact; perform serial 7 subtraction test for 6 steps and making no mistake ([id number]  Cerebellum : no dysmetria with bilateral finger-to-nose test ; no dysmetria with bilateral heel-to-shin test  Cranial Nerves :  grossly intact CN II to XII function  Sensory : intact light touch and pin prick sensation at bilateral upper & lower extremities  Motor : normal tone at bilateral upper & lower extremities ; 4+/5 muscle strength at the bilateral hip flexion; 4+/5 to 5/5 muscle strength at the bilateral knee flexion; normal 5/5 muscle strength at the rest of bilateral upper & lower extremities  Reflex : 2+ bilateral knees and bilateral ankles reflexes; 1+ bilateral biceps and bilateral brachioradialis reflexes; 0 bilateral triceps reflexes  Pathological Reflex :  No Nadine's sign ; no Babinski sign ; no ankle clonus  Gait : Not assessed      Diagnostics:  Recent Results (from the past 24 hour(s))   Basic Metabolic Panel w/ Reflex to MG    Collection Time: 09/19/22  3:31 AM   Result Value Ref Range    Sodium 136 135 - 145 meq/L    Potassium reflex Magnesium 3.8 3.5 - 5.2 meq/L    Chloride 102 98 - 111 meq/L    CO2 25 23 - 33 meq/L    Glucose 90 70 - 108 mg/dL    BUN 10 7 - 22 mg/dL    Creatinine 0.8 0.4 - 1.2 mg/dL    Calcium 8.0 (L) 8.5 - 10.5 mg/dL   CBC with Auto Differential    Collection Time: 09/19/22  3:31 AM   Result Value Ref Range    WBC 9.4 4.8 - 10.8 thou/mm3    RBC 3.04 (L) 4.70 - 6.10 mill/mm3    Hemoglobin 8.9 (L) 14.0 - 18.0 gm/dl    Hematocrit 27.4 (L) 42.0 - 52.0 %    MCV 90.1 80.0 - 94.0 fL    MCH 29.3 26.0 - 33.0 pg    MCHC 32.5 32.2 - 35.5 gm/dl    RDW-CV 15.5 (H) 11.5 - 14.5 %    RDW-SD 49.6 (H) 35.0 - 45.0 fL    Platelets 113 277 - 339 thou/mm3    MPV 10.8 9.4 - 12.4 fL    Seg Neutrophils 76.2 %    Lymphocytes 11.3 %    Monocytes 9.0 %    Eosinophils 1.0 %    Basophils 0.7 %    Immature Granulocytes 1.8 %    Segs Absolute 7.2 1.8 - 7.7 thou/mm3    Lymphocytes Absolute 1.1 1.0 - 4.8 thou/mm3    Monocytes Absolute 0.8 0.4 - 1.3 thou/mm3    Eosinophils Absolute 0.1 0.0 - 0.4 thou/mm3    Basophils Absolute 0.1 0.0 - 0.1 thou/mm3    Immature Grans (Abs) 0.17 (H) 0.00 - 0.07 thou/mm3    nRBC 0 /100 wbc   Anion Gap    Collection Time: 09/19/22  3:31 AM   Result Value Ref Range    Anion Gap 9.0 8.0 - 16.0 meq/L   Glomerular Filtration Rate, Estimated    Collection Time: 09/19/22  3:31 AM   Result Value Ref Range    Est, Glom Filt Rate >90 ml/min/1.73m2      Latest Reference Range & Units 9/15/22 18:00   Sodium 135 - 145 meq/L 129 (L)   Potassium 3.5 - 5.2 meq/L 5.6 (H)   Chloride 98 - 111 meq/L 93 (L)   CO2 23 - 33 meq/L 20 (L)   BUN,BUNPL 7 - 22 mg/dL 41 (H)   Creatinine 0.4 - 1.2 mg/dL 2.1 (H)   Anion Gap 8.0 - 16.0 meq/L 16.0   Est, Glom Filt Rate ml/min/1.73m2 32 !    Lactic Acid, Sepsis 0.5 - 1.9 mmol/L 1.7   Glucose, Random 70 - 108 mg/dL 132 (H)   CALCIUM, SERUM, 094306 8.5 - 10.5 mg/dL 9.4   Osmolality Calc 275.0 - 300.0 mOsmol/kg 270.9 (L) Total Protein 6.1 - 8.0 g/dL 8.1 (H)   Procalcitonin 0.01 - 0.09 ng/mL 0.54 (H)   (L): Data is abnormally low  (H): Data is abnormally high  !: Data is abnormal       Latest Reference Range & Units 9/15/22 18:00   Pro-BNP 0.0 - 900.0 pg/mL 1422.0 (H)   Troponin T ng/ml < 0.010   (H): Data is abnormally high       Latest Reference Range & Units 9/15/22 18:00   WBC 4.8 - 10.8 thou/mm3 24.5 (H)   RBC 4.70 - 6.10 mill/mm3 4.36 (L)   Hemoglobin Quant 14.0 - 18.0 gm/dl 12.8 (L)   Hematocrit 42.0 - 52.0 % 38.6 (L)   MCV 80.0 - 94.0 fL 88.5   MCH 26.0 - 33.0 pg 29.4   MCHC 32.2 - 35.5 gm/dl 33.2   MPV 9.4 - 12.4 fL 11.0   RDW-CV 11.5 - 14.5 % 15.5 (H)   RDW-SD 35.0 - 45.0 fL 50.4 (H)   Platelet Count 904 - 400 thou/mm3 568 (H)   Platelet Estimate Adequate  INCREASED   Lymphocytes Absolute 1.0 - 4.8 thou/mm3 1.7   Monocytes Absolute 0.4 - 1.3 thou/mm3 1.5 (H)   Eosinophils Absolute 0.0 - 0.4 thou/mm3 0.0   Basophils Absolute 0.0 - 0.1 thou/mm3 0.1   Seg Neutrophils % 84.2   Segs Absolute 1.8 - 7.7 thou/mm3 20.6 (H)   Lymphocytes % 7.0   Monocytes % 6.0   Eosinophils % 0.1   Basophils % 0.6   Immature Grans (Abs) 0.00 - 0.07 thou/mm3 0.51 (H)   Immature Granulocytes % 2.1   (H): Data is abnormally high  (L): Data is abnormally low       Latest Reference Range & Units 9/17/22 05:59 9/18/22 06:18 9/19/22 03:31   Sodium 135 - 145 meq/L 132 (L) 138 136   Potassium 3.5 - 5.2 meq/L 3.5 3.6 3.8   Chloride 98 - 111 meq/L 100 104 102   CO2 23 - 33 meq/L 22 (L) 25 25   BUN,BUNPL 7 - 22 mg/dL 32 (H) 14 10   Creatinine 0.4 - 1.2 mg/dL 1.3 (H) 0.9 0.8   Anion Gap 8.0 - 16.0 meq/L 10.0 9.0 9.0   Est, Glom Filt Rate ml/min/1.73m2 56 ! 86 ! >90   Magnesium 1.6 - 2.4 mg/dL 2.2     Glucose, Random 70 - 108 mg/dL 118 (H) 95 90   CALCIUM, SERUM, 409240 8.5 - 10.5 mg/dL 7.6 (L) 8.2 (L) 8.0 (L)   (L): Data is abnormally low  (H): Data is abnormally high  !: Data is abnormal     Latest Reference Range & Units 9/17/22 23:47 9/18/22 06:18 9/19/22 03:31   WBC 4.8 - 10.8 thou/mm3  11.8 (H) 9.4   RBC 4.70 - 6.10 mill/mm3  3.08 (L) 3.04 (L)   Hemoglobin Quant 14.0 - 18.0 gm/dl 8.5 (L) 9.1 (L) 8.9 (L)   Hematocrit 42.0 - 52.0 % 26.2 (L) 27.6 (L) 27.4 (L)   MCV 80.0 - 94.0 fL  89.6 90.1   MCH 26.0 - 33.0 pg  29.5 29.3   MCHC 32.2 - 35.5 gm/dl  33.0 32.5   MPV 9.4 - 12.4 fL  10.5 10.8   RDW-CV 11.5 - 14.5 %  15.6 (H) 15.5 (H)   RDW-SD 35.0 - 45.0 fL  51.3 (H) 49.6 (H)   Platelet Count 996 - 400 thou/mm3  249 275   (H): Data is abnormally high  (L): Data is abnormally low      Portable chest x-ray (9/15/2022) : Impression   1. No acute cardiopulmonary abnormality. CTA of the chest with and without contrast (9/15/2022) : Impression   1. Heavy burden pulmonary emboli within the segmental and subsegmental arteries of all 5 lobes. No evidence of right heart strain. 2. Small amount of tree-in-bud nodularity along the left lung base suggestive of an infectious or inflammatory process including aspiration pneumonitis. Bilateral lower extremities venous duplex Doppler study (9/15/2022) : Impression   1. Large acute deep venous thrombosis extending from the left common femoral vein through the visualized veins of the calf. 2. Acute deep venous thrombosis present within the right common femoral, proximal superficial femoral and peroneal veins. CT of abdomen and pelvis without contrast (9/16/2022) : Impression   1. There is appearance of the aortobifemoral graft placement. 2. Large right retroperitoneal hematoma. Small left retroperitoneal hematoma. These are felt to relate to prior abdominal aortic aneurysm. 3. The inferior vena cava is compressed distally by the large right retroperitoneal hematoma. CTA of abdomen and pelvis with and without contrast (9/17/2022) : Impression   1. Postop changes in the abdomen related to aortobifemoral bypass graft insertion.  Extensive intra-abdominal soft tissue stranding and chronic hematoma at the operative site and tracking into the pelvis along the psoas muscles bilaterally, worse on the right. 2. Focal infarcts in the right kidney, unchanged from yesterday. 3. Findings of impending diarrhea. Stent in the inferior vena cava. Hydropic gallbladder. Echocardiogram (9/16/2022) :  Conclusions Summary   Technically difficult examination. Left ventricle size is normal.   Normal left ventricular wall thickness. There was moderate global hypokinesis of the left ventricle. Systolic function was moderately reduced. Ejection fraction is visually estimated at 40%. Doppler parameters were consistent with abnormal left ventricular   relaxation (grade 1 diastolic dysfunction). Impression:  Debility/physical decondition secondary to abdominal aortic aneurysm repair complicated by IVC compression by large right retroperitoneal hematoma requiring IVC thrombectomy, angioplasty and stenting, bilateral lower extremities deep vein thrombosis especially on the left side and extensive bilateral pulmonary embolism requiring left lower extremity venous thromboaspiration and IVC filter placement  Status post abdominal aortic aneurysm repair  Bilateral lower extremities acute DVT and extensive bilateral pulmonary embolism requiring left lower extremity venous thromboaspiration and IVC filter placement  Large right retroperitoneal hematoma causing IVC compression requiring IVC angioplasty and stenting  Hypertension  History of heavy alcoholic usage  History of umbilical hernia repair    The patient will benefit from a daily inpatient rehab intervention to improve his function. The patient is expected to be able to tolerate the minimal 3-hour rehab intervention required for intensive rehab program.  We plan to initiate insurance pre-cert process for possible inpatient rehab admission. He also are waiting for definitive long-term oral anticoagulation therapy plan to be decided.       Recommendations:  Continue ongoing PT and OT treatment while the patient remains in acute hospital  Waiting for definitive long-term oral anticoagulation therapy plan to be decided for bilateral lower extremities DVT and bilateral extensive pulmonary embolism  The patient will benefit from a daily intensive inpatient rehab intervention to improve his function. We plan to initiate insurance pre-cert process for possible inpatient rehab admission. Plan to admit the patient to inpatient rehab service when insurance approval is obtained, definitive long-term oral anticoagulation therapy is initiated, all medically necessary diagnostic test and treatment are completed, and the patient is medically stable and cleared to be discharged from acute hospital.      It was my pleasure to evaluate Nallely Quiet today. Please call with questions.     Cleve Pratt MD

## 2022-09-19 NOTE — PROGRESS NOTES
09/19/22 0950   Encounter Summary   Encounter Overview/Reason  Spiritual/Emotional Needs   Service Provided For: Patient   Referral/Consult From: Michelle   Last Encounter  09/19/22   Complexity of Encounter Low   Begin Time 0940   End Time  0945   Total Time Calculated 5 min   Encounter    Type Initial Screen/Assessment   Spiritual/Emotional needs   Type Spiritual Support   Assessment/Intervention/Outcome   Assessment Unable to assess   Intervention Prayer (assurance of)/[place]   During my encounter with the  62  yr old patient, I attempted to visit with the pt on 4K. The patient appears to be resting now and I didn't want to disturb the patient. I or another Hawa Allen will attempt to visit the patient or the family at another time. The pt was admitted due to multiple subsegmental pulmonary emboli. The pt has a history of hypertension and alcohol abuse. Plan: Continued spiritual support would be helpful.

## 2022-09-20 LAB
ANION GAP SERPL CALCULATED.3IONS-SCNC: 10 MEQ/L (ref 8–16)
BUN BLDV-MCNC: 13 MG/DL (ref 7–22)
CALCIUM SERPL-MCNC: 8.4 MG/DL (ref 8.5–10.5)
CHLORIDE BLD-SCNC: 103 MEQ/L (ref 98–111)
CO2: 23 MEQ/L (ref 23–33)
CREAT SERPL-MCNC: 0.8 MG/DL (ref 0.4–1.2)
ERYTHROCYTE [DISTWIDTH] IN BLOOD BY AUTOMATED COUNT: 15.6 % (ref 11.5–14.5)
ERYTHROCYTE [DISTWIDTH] IN BLOOD BY AUTOMATED COUNT: 51.2 FL (ref 35–45)
GFR SERPL CREATININE-BSD FRML MDRD: > 90 ML/MIN/1.73M2
GLUCOSE BLD-MCNC: 102 MG/DL (ref 70–108)
HCT VFR BLD CALC: 30 % (ref 42–52)
HEMOGLOBIN: 9.4 GM/DL (ref 14–18)
MCH RBC QN AUTO: 28.6 PG (ref 26–33)
MCHC RBC AUTO-ENTMCNC: 31.3 GM/DL (ref 32.2–35.5)
MCV RBC AUTO: 91.2 FL (ref 80–94)
PLATELET # BLD: 286 THOU/MM3 (ref 130–400)
PMV BLD AUTO: 10.4 FL (ref 9.4–12.4)
POTASSIUM REFLEX MAGNESIUM: 3.8 MEQ/L (ref 3.5–5.2)
RBC # BLD: 3.29 MILL/MM3 (ref 4.7–6.1)
SODIUM BLD-SCNC: 136 MEQ/L (ref 135–145)
WBC # BLD: 9.4 THOU/MM3 (ref 4.8–10.8)

## 2022-09-20 PROCEDURE — 80048 BASIC METABOLIC PNL TOTAL CA: CPT

## 2022-09-20 PROCEDURE — 36415 COLL VENOUS BLD VENIPUNCTURE: CPT

## 2022-09-20 PROCEDURE — 97530 THERAPEUTIC ACTIVITIES: CPT

## 2022-09-20 PROCEDURE — 97110 THERAPEUTIC EXERCISES: CPT

## 2022-09-20 PROCEDURE — APPSS30 APP SPLIT SHARED TIME 16-30 MINUTES: Performed by: PHYSICIAN ASSISTANT

## 2022-09-20 PROCEDURE — 97535 SELF CARE MNGMENT TRAINING: CPT

## 2022-09-20 PROCEDURE — 2060000000 HC ICU INTERMEDIATE R&B

## 2022-09-20 PROCEDURE — 6370000000 HC RX 637 (ALT 250 FOR IP): Performed by: FAMILY MEDICINE

## 2022-09-20 PROCEDURE — 85027 COMPLETE CBC AUTOMATED: CPT

## 2022-09-20 PROCEDURE — 99232 SBSQ HOSP IP/OBS MODERATE 35: CPT | Performed by: FAMILY MEDICINE

## 2022-09-20 PROCEDURE — 2580000003 HC RX 258: Performed by: STUDENT IN AN ORGANIZED HEALTH CARE EDUCATION/TRAINING PROGRAM

## 2022-09-20 RX ADMIN — METOPROLOL TARTRATE 25 MG: 25 TABLET, FILM COATED ORAL at 09:36

## 2022-09-20 RX ADMIN — SODIUM CHLORIDE, PRESERVATIVE FREE 10 ML: 5 INJECTION INTRAVENOUS at 09:42

## 2022-09-20 RX ADMIN — METOPROLOL TARTRATE 25 MG: 25 TABLET, FILM COATED ORAL at 19:48

## 2022-09-20 RX ADMIN — SODIUM CHLORIDE, PRESERVATIVE FREE 10 ML: 5 INJECTION INTRAVENOUS at 19:49

## 2022-09-20 ASSESSMENT — PAIN SCALES - GENERAL: PAINLEVEL_OUTOF10: 0

## 2022-09-20 NOTE — FLOWSHEET NOTE
09/20/22 1008   Safe Environment   Safety Measures Other (comment)  (virtual safety round complete)   Virtual Nurse rounds, staff responded to audio that they were with the patient, camera not turned on

## 2022-09-20 NOTE — FLOWSHEET NOTE
09/20/22 1350   Safe Environment   Safety Measures Other (comment)  (virtual safety round complete)   Virtual Nurse introduced, pt denies needs at this time.

## 2022-09-20 NOTE — PROGRESS NOTES
[address] [facility] PHYSICAL THERAPY  DAILY NOTE  STR[facility] ICU STEPDOWN TELEMETRY 4K - 4K-01/001-A    Time In: 4702  Time Out: 1147  Timed Code Treatment Minutes: 23 Minutes  Minutes: 23          Date: 2022  Patient Name: Janet Gary,  Gender:  male        MRN: [id number]  : 1960  (62 y.o.)     Referring Practitioner: Ray Andrade DO  Diagnosis: Swelling  Additional Pertinent Hx: Janet Gary is a 62 y.o. male with PMHx as noted below who presents to [facility] for evaluation of worsening GUZMAN, tachypnea and lower extremity swelling. Patient recently was discharged from [place] where he was there for 2 weeks for a AAA repair. Patient was discharged to [facility] for further rehab. Reports that he arrived there on the day prior to admission around 2 PM.  On the morning of admission patient states that he tried to walk around and noticed that after just a few feet he would get short of breath. Later in the morning he noticed that his upper left leg started getting sore. He subsequently started developing hyperventilation approximately 1 hour after. EMS was called and patient was brought to the ED for further evaluation. Patient states that he has never had a blood clot before in the past.  On arrival patient was found to be afebrile, tachypnea, tachycardic and hypertensive. He was placed on 3 L of O2 via NC. CTA chest revealed heavy burden of pulmonary emboli within the segmental and subsegmental arteries of all 5 lobes. VL venous duplex ultrasound of the lower extremities revealed a large left DVT with extension from the veins in the calf to the left common femoral vein. There is also a DVT in the right common femoral, proximal superficial femoral and peroneal veins. Placed on heparin gtt.  and immediately transferred to IR where Dr. Silvina Leone performed a thrombectomy of the LLE as well as angioplasty of the IVC  -- Dr. Silvina Leone spoke to retraction; standing tolerance ~1 minute prior to fatigue    Exercise:  Patient was guided in 1 set(s) 10 reps of exercise to both lower extremities. Standing heel/toe raises, Standing marches, Standing hip abduction/adduction, and Mini squats. Exercises were completed for increased independence with functional mobility. Pt requires seated rest break after two consecutive exercises due to fatigue. Functional Outcome Measures: Completed  -PAC Inpatient Mobility without Stair Climbing Raw Score : 15  AM-PAC Inpatient without Stair Climbing T-Scale Score : 43.03    ASSESSMENT:  Assessment: Patient progressing toward established goals. Activity Tolerance:  Patient tolerance of  treatment: good. Equipment Recommendations:Equipment Needed: No  Discharge Recommendations: Inpatient Rehabilitation  Plan: Current Treatment Recommendations: Strengthening, Balance training, Functional mobility training, Transfer training, Endurance training, Neuromuscular re-education, Stair training, Gait training, Therapeutic activities, Patient/Caregiver education & training, Home exercise program, Safety education & training  Plan:  (5x GM)    Patient Education  Patient Education: Transfers, Gait    Goals:  Patient goals : to get stronger  Short Term Goals  Time Frame for Short term goals: by discharge  Short term goal 1: Pt to transfer supine <--> sit mod I to enable pt to get in/out of bed. Short term goal 2: Pt to transfer sit <--> stand mod I for increased functional mobility. Short term goal 3: Pt to ambulate >500 feet without AD Independently for community re-entry. Short term goal 4: Pt to ascend/descend 12 steps with HR mod I for bedroom access. Long Term Goals  Time Frame for Long term goals : NA due to short length of stay. Following session, patient left in safe position with all fall risk precautions in place.

## 2022-09-20 NOTE — CARE COORDINATION
9/20/22, 9:56 AM EDT  DISCHARGE PLANNING EVALUATION    VÍCTOR called Kisha Brito and notified her that inpatient rehab has accepted and precert is pending. She told SW that patient has an appointment on 9/27/2022 to remove staples from his \"belly\". She reported that patient will not be able to make it to that appointment obviously and is wondering if someone at [address] [place] [address] can remove them when ready. VÍCTOR spoke with Scottie at Inpatient rehab and she reported that if patient admits to inpatient rehab they will reach out to that physician to see if inpatient rehab can remove those padmaja. VÍCTOR called Kisha Brito and explained that if patient comes to rehab, pending precert, they will reach out to the doctor who was scheduled to remove them and ask him if inpatient rehab can remove them.

## 2022-09-20 NOTE — PROGRESS NOTES
Denial received via phone call, stating \"not medically necessary\" for dates 9/20/2022-9/26/2022. Reference number [id number]. Information was faxed to our facility.

## 2022-09-20 NOTE — PROGRESS NOTES
CT/CV Surgery Progress Note    2022 8:04 AM  Surgeon:  Dr. Felicity Bamberger: Mr. Debra Pastor is resting comfortably in bed on 2L, alert, and in no acute distress. Pt denies chest pressure, SOB, fever,chills, N/V/D.    Hgb- 9.4    I/O last 3 completed shifts: In: 7887.5 [P.O.:1460; I.V.:6230.9; IV Piggyback:196.6]  Out: 2675 [Urine:2675]    Vital Signs: BP (!) 134/92   Pulse 96   Temp 97.6 °F (36.4 °C) (Oral)   Resp 20   Ht 6' (1.829 m)   Wt 176 lb 4.8 oz (80 kg)   SpO2 95%   BMI 23.91 kg/m²    Temp (24hrs), Av °F (36.7 °C), Min:97.6 °F (36.4 °C), Max:98.9 °F (37.2 °C)    Labs:   CBC:   Recent Labs     22  0618 22  0331 22  0518   WBC 11.8* 9.4 9.4   HGB 9.1* 8.9* 9.4*   HCT 27.6* 27.4* 30.0*   MCV 89.6 90.1 91.2    275 286       BMP:   Recent Labs     22  0618 22  0331 22  0518    136 136   K 3.6 3.8 3.8    102 103   CO2 25 25 23   BUN 14 10 13   CREATININE 0.9 0.8 0.8       Imaging:  INFERIOR VENA CAVA FILTER INSERTION /and angioplasty right common iliac vein :  Impression   1. Status post angioplasty origin right common iliac vein with excellent result. 2. Status post successful IVC filter insertion. 3. Patient will be scheduled for follow-up visit in 3 months to discuss possible IVC filter retrieval.               **This report has been created using voice recognition software. It may contain minor errors which are inherent in voice recognition technology. **       Final report electronically signed by Dr. Laurann Severe on 2022 5:20 PM       Intake/Output Summary (Last 24 hours) at 2022 0804  Last data filed at 2022 0721  Gross per 24 hour   Intake 1460 ml   Output 1750 ml   Net -290 ml         Scheduled Meds:    metoprolol tartrate  25 mg Oral BID    sodium chloride flush  5-40 mL IntraVENous 2 times per day     ROS: All neg unless specifically mentioned in subjective section.      Exam:  General Appearance: alert ,conversing, in no acute distress  Cardiovascular: normal rate, regular rhythm, normal S1 and S2, no murmurs, rubs, clicks, or gallops  Pulmonary/Chest: clear to auscultation bilaterally- no wheezes, rales or rhonchi, normal air movement, no respiratory distress  Neurological: alert, oriented, normal speech, no focal findings or movement disorder noted      Assessment:   Patient Active Problem List   Diagnosis    Essential hypertension    Tobacco abuse    History of ETOH abuse    Umbilical hernia without obstruction and without gangrene    Multiple subsegmental pulmonary emboli without acute cor pulmonale (HCC)    Acute bilateral deep vein thrombosis (DVT) of femoral veins (HCC)    Compression of vena cava    AGNES (acute kidney injury) ([address]    Hyponatremia    Hypokalemia    Leukocytosis    Abnormal CT of the chest    Thrombocytosis    Physical deconditioning    Acute anemia    Hypocalcemia    LV dysfunction    Diastolic dysfunction    Sinus tachycardia     Plan:   IVC filter placed. Continue medical therapy. OK to resume heparin starting tomorrow from CV surgery standpoint.     The plan of care was discussed in detail with Dr. Jacoby Meyers     Electronically signed by Koki Peoples PA-C on 9/20/2022 at 8:04 AM

## 2022-09-20 NOTE — PROGRESS NOTES
[facility]  Occupational Therapy  Daily Note  Time:   Time In: 9352  Time Out: 1043  Timed Code Treatment Minutes: 23 Minutes  Minutes: 23          Date: 2022  Patient Name: Rahel Foster,   Gender: male      Room: [facility]001-A  MRN: [id number]  : 1960  (62 y.o.)  Referring Practitioner: Jose Baker DO  Diagnosis: swelling  Additional Pertinent Hx: Rahel Foster is a 62 y.o. male who presents to the emergency department for evaluation of shortness of breath and left upper leg pain. The patient states that he recently had a abdominal aortic aneurysm repair at the end of August at [place].  While at rehab today, he developed sudden onset left upper leg pain followed by shortness of breath. His left lower extremity is noticeably more swollen than the right and appears darkly erythematous and mottled. He is currently complaining of shortness of breath. Found to have extensive BLE DVT's and multifocal Pe's -- s/p LLE thromboaspiration by IR on admission -- found to have compression of IVC. CT abd found to have aortobifem graft, large right RP hematoma and small on left, IVC compressed distally by large right RP hematoma. Pt s/p IVC filter placement on 22    Restrictions/Precautions:  Restrictions/Precautions: Fall Risk     SUBJECTIVE: RN okayed OT session. Pt.supine in bed upon arrival reluctant to participate did agree with some encouragement. Pt. Reports he feels overwhelmed with all the staff members coming in/out of his room all day. Pt. Provided with positive support. PAIN:Denies    Vitals: Vitals not assessed per clinical judgement, see nursing flowsheet  Pt. On continuous heart and O2 monitor    COGNITION: WFL    ADL:   Grooming: Stand By Assistance. With CGA for more dynamic components. Lower Extremity Dressing: Dependent. To don socks  Toileting: Contact Guard Assistance.      Toilet Transfer: Stand By Assistance. Hina Bentley BALANCE:  Sitting Balance:  Stand By Assistance. Standing Balance: Stand By Assistance, Contact Guard Assistance-for dynamic components. BED MOBILITY:  Supine to Sit: Stand By Assistance    Sit to Supine: Stand By Assistance    Scooting: Stand By Assistance      TRANSFERS:  Sit to Stand:  Stand By Assistance. Stand to Sit: Stand By Assistance. FUNCTIONAL MOBILITY:  Assistive Device: None  Assist Level:  Contact Guard Assistance. Distance: To and from bathroom and [facility] distance in the montemayor. Pt. Declined further activity after completing toileting. .    ASSESSMENT:     Activity Tolerance:  Patient tolerance of  treatment: fair. Discharge Recommendations: Continue to assess pending progress and Patient would benefit from continued OT at discharge  Equipment Recommendations: Equipment Needed: No  Other: defer to next level of care  Plan: Times per Week: 5x  Current Treatment Recommendations: Strengthening, Balance training, Functional mobility training, Endurance training, Patient/Caregiver education & training, Equipment evaluation, education, & procurement, Self-Care / ADL, Home management training    Patient Education  Patient Education: ADL's and Importance of Increasing Activity and safety with functional mobility and transfers. Goals  Short Term Goals  Time Frame for Short term goals: by discharge  Short Term Goal 1: Pt will increase activity tolerance for functional mobility to/from bathroom, progressing to household distances, with supervision and Sp02 >=90% in prep for ADL completion. Short Term Goal 2: Pt will complete BADL tasks with supervision, incorporating ECT prn, to increase independence and ease with self care tasks. Short Term Goal 3: Pt will tolerate dynamic standing X5 minutes with supervision in prep for sinkside grooming tasks. Following session, patient left in safe position with all fall risk precautions in place.

## 2022-09-20 NOTE — PROGRESS NOTES
Precert information updated over telephone to representative Melisa states that the case will go to the medical director and will have determination later today. RADHA Jones updated.

## 2022-09-20 NOTE — PROGRESS NOTES
PROGRESS NOTE      Patient:  [facility]    Unit/Bed:4-01/001-A    YOB: 1960    MRN: [id number]     Acct: [de-identified]    PCP: WENDI Rivera    Date of Admission: 9/15/2022 LOS: 5    Date of Evaluation:  9/20/2022    Anticipated Discharge: To inpatient rehab    Assessment/Plan:    Bilateral Multilobular Pulmonary Embolism and Bilateral Proximal DVTs: Confirmed on CTA chest 9/15/22 and dopplers of BLE 9/15/22 --> PE's  noted to be heavy burden w/in segmental and subsegmental of all 5 lobes but no right heart strain. BP has remained stable, however Hgb dropped to 7.2 over 2 days from 12.8 at admission 9/15/22. Bilateral DVTs are proximal with left > right. Was hospitalized in [place] for 2 weeks for AAA repair (8/29 - 9/13)  -- Patient placed on heparin gtt. and underwent LLE thrombectomy by Dr. Adam Witt 9/15 and then IVC stent placement 9/16 with additional thromboaspiration then 9/18 IVC filter placement  - Echo 9/16/22 w/o RV strain with extensive Pe's, EF 40% with mod global hypokinesis, grade 1 DD  - Heparin gtt started on admission -> stopped 9/18 per CTS for concern of bleeding with drop in h/h 9/17, pt received PRBC x2, s/p IVC filter 9/18  - S/p IVC filter placement 9/18 -> CTS recs to resume heparin 9/21  - PT/OT rec rehab, PM&R consulted recommending inpatient rehab   - Encourage IS, wean O2 as able --> on 2L NC as of 9/20/2022   - CBC and BMP daily     Acute likely blood loss Anemia --- worsening 9/17 --  Drop in Hgb from 12.8 on 9/15 to 10.5 on 9/16 to 7.4-7.2 on 9/17.  CTA abd per CTS with findings have not identified an acute bleed 9/16 nor 9/17, however the patient has bilateral retroperitoneal hematomas (POA)  -- ?due to heparin and cont bleeding vs ??loss due to recurrent thromboaspiration of BLE and IVC clots  - Consulted Thoracic surgery --> per Dr. Kiera Kincaid pt Transfused 2 units PRBC 9/17 and hgb up to 9.1  - 9/20 Hgb improved to 9.4  - Daily CBC  - Lopressor 25 mg BID for BP control  - ?need to r/o GI etiology, will consider FOBT if Hgb drops again  - Transfuse <7  - no signs of hemolysis 9/20  - CTS okay with resuming heparin 9/21 if pt remains stable     LV dysfunction with diastolic dysfunction --  Given extensive clot history, this may be an acute development, however chronic cardiac dysfunction cannot be excluded entirely. ECHO from 9/16/ss with moderatedly reduced systolic function and EF of 40% as well as grade 1 diastolic dysfunction.  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 Status post successful thromboaspiration procedure with significant improvement and s/p agnioplasty of IVC and s/p stenting of IVC restoring good flow lumen caliber to IVC  - 9/18 Per IR, inferior vena cava filter insertion , and angioplasty right common iliac vein, successful with minimal EBL     AGNES, Pre-Renal etiology, resolved: up to 2.1 on admission 9/15 --> FeNa 0.3% 9/15. Recently hospitalized at [facility], s/p AAA rupture repair, ?heavy contrast exposure? --> noted per CTA abd 9/17 of focal infarcts in right kidney -- Cr baseline unknown. - IV NS stopped 9/19 evening, continue encouraging PO hydration  - 9/17 got contrast CTA, monitor Cr for rise in few days  - 9/20 Cr stable at 0.8 again  - Holding ACE inhibitor  - Lopressor 25 mg BID for BP control  - Daily weights, I/Os, BMP     Leukocytosis, resolved: afeb - CT chest on admission with small amount of tree-in-bud nodularity along the left lung base suspicious for aspiration pneumonia vs PE inflammation. Afebrile. No history of recent cough.  No diarrhea and no acute findings of infxn on CT abd  - Procalcitonin elevation likely due to decreased renal clearance but ??due to pneumonia   - Empiric IV Rocephin started on admission 9/15, completed 9/19  - 9/20 WBC WNL at 9.4, stable  - u/a 9/15 (-), no BC done on admission  - Daily CBC     Possible pneumonia LLL  -- CTA chest on admission 9/15/22 = Small amount of tree-in-bud nodularity along the left lung base suggestive of an infectious or inflammatory process including aspiration pneumonitis   - PCT elevated 0.54 and WBC up to 24.5 on admission 9/15 thus will treat as infxn with recent extensive admission at [facility]  - Completed IV Rocephin x 5days from 9/16--> 9/19  - 9/20 WBC at 9.4 WNL again  - Will consider repeat imaging if condition worsens     Combined Metabolic Alkalosis with Respiratory Alkalosis, improved: Due to AGNES and tachypnea from PE - improving and WNL on 9/20 - monitor     Mild Asymptomatic Hyponatremia, resolved: Etiology likely AGNES and per [facility] records pt was d/c with HCTZ?? -- holding any diuretics and improved with IVF to 136 on 9/19 from 129 on admission 9/15 -- stopped IVF 9/19 evening.   - 9/20 Na WNL at 136  - Daily BMP     Mild Asymptomatic Hyperkalemia 2/2 ACE-I Therapy, resolved: K 5.6 on arrival.   - Holding ACE inhibitor   - IVF started on admission  - Stable EKG 9/19  - 9/20 K stable at 3.8 again   - Daily BMP     Sinus tachycardia -- due to above -monitor tele - ?BB as was on lopressor at d/c per d/c summary at [facility] -- 9/19 EKG with NSR, HR at 98--- continue with Lopressor 25 mg BID, ?increase if HR not at goal vs adding lisinopril?      Thrombocytosis -- likely reactive -- platelets stable 9/31 at 286 off heparin gtt - will monitor, and per CTS consider restarting heparin if stable 9/21     Recent AAA rupture s/p emergent Repair: Performed at [place] 8/29/22 with +cardiac arrest also noted per notes reviewed and had returned to OR 8/31/22 for a washout   - Consulted thoracic surgery with above and apprec assist  - s/p 27 units PRBC, 28 units FFP, 5 cryo, 3 packs plts noted per record review at [facility]  - no active bleeding from aorta per CTA abd 9/17/22 but has residual bilateral RP hematomas  - s/p 2 units PRBC 9/17 per CTS after hgb dropped to 9/17, heparin gtt stopped  - 9/20 CTS okay with restarting heparin 9/21 if Hgb remains stable  - 9/20 lopressor 25 mg BID for tight BP control s/p AAA rupture/repair     Essential HTN -- ?on lisinopril at [facility] - per d/c summary from [facility] pt was dc with metoprolol and HCTZ -- tight control with recent AAA rupture -- 9/20 on Lopressor 25 mg BID for HR and BP control, ?consider increase if HR and/or BP remain elevated above goal?     Hx ETOH abuse - LFT WNL - no ETOH since surgery 8/29/22     Tobacco abuse - +smoker prior to AAA rupture -> cont encourage cessation     Deconditioning -- from recent hospitalization at [facility] and currently - was d/c to [facility] from [facility] -> PT/OT c/s and treating-- PMR consulted w/ recs for inpatient rehab, pre-certification pending. Moderate Malnourishment: Dietary following -  Moderate malnutrition noted 9/19 due to acute illness, continue Ensure TID with full adult diet. Chief Complaint: GUZMAN/SOB, tachypnea, leg swelling    Hospital Course: per H&P   \" Isabella Rodriguez is a 62 y.o. male with PMHx as noted below who presents to [address] [place] [address] for evaluation of worsening GUZMAN, tachypnea and lower extremity swelling. Patient recently was discharged from [place] where he was there for 2 weeks for a AAA repair. Patient was discharged to [facility] for further rehab. Reports that he arrived there on the day prior to admission around 2 PM.  On the morning of admission patient states that he tried to walk around and noticed that after just a few feet he would get short of breath. Later in the morning he noticed that his upper left leg started getting sore.   He subsequently started developing hyperventilation approximately 1 hour after. EMS was called and patient was brought to the ED for further evaluation. Patient states that he has never had a blood clot before in the past.    Per prior progress notes  \"Summarized ED workup and finding  -- On arrival patient was found to be afebrile, tachypnea, tachycardic and hypertensive. He was placed on 3 L of O2 via NC.  -- Labs revealed a mild hyponatremia, mild hyperkalemia, prerenal AGNES, reactive leukocytosis, reactive thrombocythemia  -- EKG revealed sinus tachycardia  -- CTA chest revealed heavy burden of pulmonary emboli within the segmental and subsegmental arteries of all 5 lobes. VL venous duplex ultrasound of the lower extremities revealed a large left DVT with extension from the veins in the calf to the left common femoral vein. There is also a DVT in the right common femoral, proximal superficial femoral and peroneal veins  --Placed on heparin gtt. and immediately transferred to IR where Dr. Luis Francis performed a thrombectomy of the LLE as well as angioplasty of the IVC  -- Dr. Luis Francis spoke to this resident over telephone; patient has what appears to be a compressed IVC which was unable to be opened up via angioplasty. Recommends stenting tomorrow morning     Pertinent/Relevant History  -- Patient was recently discharged from [facility] for a AAA repair  -- He was then transferred to [facility] for rehab where on the day before admission he had the following events occur in chronologic order: proximal left leg pain, left leg swelling, GUZMAN and then tachypnea\"     9/16 - CT abd pelvis w/o contrast this AM, Bilateral retroperitoneal hematoma R>L which may relate to prior AAA, and IVC compressed distally by right retroperitoneal hematoma. ICV stent today, successful. Leukocytosis improving today, will follow, empiric Rocephin. NS at 125 ml/hr perioperatively from IR stenting, will begin weaning tomorrow with full diet.  Cr improving to 1.8, Na to 131, K WNL at 4.5.     9/17 - Hemoglobin dropped this AM from 10.5 to 7.4, repeat 7.2. Consulted Vascular surgery. Patient was consented for blood products and transfused, H&H trends ordered. Abd CTA w/wo contrast taken, revealed stable post-surgical changes compared to CT 9/16. Will will continue the heparin gtt despite drop in Hgb of unclear etiology given his extensive clotting with bilateral PE and bilateral DVTs. IR took out R groin slip knot, PT/OT consulted for tomorrow morning. Cr improving to 1.3, Na improving to 132, K WNL at 3.5. \"    \"9/18/2022  --> pt w/o new c/o - denies any cp. No abd pain. C/o some numbness and tingling in right leg but no pain. Felix po. No n/v. Denies f/c. On 3L O2. Afebrile. Hasn't gotten OOB yet. Last BM 9/18 - -2.2 L since admission\"    Plan of Care 9/18 per Hospitalist -- \"d/w Dr. Morgan Caba concern for bleeding on heparin gtt with drop in h/h yesterday to 7.2 from 12.8 on 9/15 and 10.5 on 9/16 --> he stopped heparin gtt and CTA abd/pelvis done and cont with RP hematoma (present on admission) but with tracking to psoas muscles bilaterally R>L. Thus Dr. Morgan Caba recommending IVC filter as plan to continue to hold heparin at this time - d/w Dr. Helen Herr IR as pt with recent IVC stent placement - he will place IVC filter today and then will await CTS recs when feel comfortable resuming heparin/anticoagulation given pt's extensive DVT's and Pe's. Cont monitor h/h. Procedure d/w pt and he is agreeable. Further risks of procedure to be discussed with pt by Dr. Helen Herr. \" Per Op note \" inferior vena cava filter insertion , and angioplasty right common iliac vein, successful, minimal blood loss, no immediate complications\"    1/43 - S/p IVC filter placement 9/18. He received 2 units of blood 9/17 and his Hbg remains stable today at 8.9. We are holding anticoagulation therapy at this time due to concern for bleeding.  Daily BMP and CBC in AM. Consulted PM&R as PT/OT recommend inpatient rehab. Dietary recommends supplemental ensure due to moderate malnourishment. Pt set to complete final day of Rocephin today. CTS and PM&R following. Will stop continuous NS if condition remains stable and PO intake is good given moderate malnutrition. 9/20 - CTS recommends that we can resume heparin therapy 9/21 from their standpoint, s/p IVC filter 9/18. The patient's Hgb is improving, now at 9.4, s/p PRBC transfusion x2 9/17 and IVF was stopped 9/19 evening. Case management and social work are working on pre-certification for inpatient rehab per PM&R and PT/OT recommendations. Rocephin x 5 days completed 9/19, WBC WNL at 9.4 again 9/20. Started 25 mg Lopressor BID, BP stable and 129/82 as of noon 9/20. Daily CBC and BMP, and if stable tomorrow will restart anticoagulation. Subjective/HPI:   Nlalely Vegas feels good overall, believes he has \"turned a corner\" as regards his medical ordeal. He denies HA, worsened SOB, CP, N/V/D, calf pain, increased leg swelling. He has not had any issues eating, nor urinating, and is taking ensure. He has no new complaints. He is comfortable and conversational on 2L of NC O2. His last BM was 9/20 AM.      PMH, SURGICAL HX, FH, SOCIAL HX reviewed and updated as needed.     Medications:  Reviewed    Infusion Medications    sodium chloride      sodium chloride      sodium chloride       Scheduled Medications    metoprolol tartrate  25 mg Oral BID    sodium chloride flush  5-40 mL IntraVENous 2 times per day     PRN Meds: sodium chloride, sodium chloride, potassium chloride **OR** potassium alternative oral replacement **OR** potassium chloride, magnesium sulfate, sodium chloride flush, sodium chloride, ondansetron **OR** ondansetron, polyethylene glycol, acetaminophen **OR** acetaminophen, docusate sodium, morphine **OR** morphine      Intake/Output Summary (Last 24 hours) at 9/20/2022 1429  Last data filed at 9/20/2022 1321  Gross per 24 hour   Intake 1978 ml Output 1725 ml   Net 253 ml         Exam:  /82   Pulse 98   Temp 97.9 °F (36.6 °C)   Resp 20   Ht 6' (1.829 m)   Wt 176 lb 4.8 oz (80 kg)   SpO2 93%   BMI 23.91 kg/m²     Physical Exam  Vitals and nursing note reviewed. Constitutional:       General: He is not in acute distress. Appearance: Normal appearance. He is not ill-appearing, toxic-appearing or diaphoretic. HENT:      Head: Normocephalic and atraumatic. Nose: Nose normal. No rhinorrhea. Mouth/Throat:      Mouth: Mucous membranes are moist.      Pharynx: Oropharynx is clear. Eyes:      General: No scleral icterus. Extraocular Movements: Extraocular movements intact. Conjunctiva/sclera: Conjunctivae normal.   Cardiovascular:      Rate and Rhythm: Normal rate and regular rhythm. Pulses: Normal pulses. Heart sounds: Normal heart sounds. No murmur heard. No friction rub. No gallop. Pulmonary:      Effort: Pulmonary effort is normal. No respiratory distress. Breath sounds: Decreased breath sounds present. No wheezing, rhonchi or rales. Abdominal:      General: Abdomen is flat. Bowel sounds are normal. There is no distension. Palpations: Abdomen is soft. There is no mass. Tenderness: There is no abdominal tenderness. Musculoskeletal:         General: No swelling, tenderness, deformity or signs of injury. Normal range of motion. Cervical back: Normal range of motion. No rigidity or tenderness. Skin:     General: Skin is warm and dry. Capillary Refill: Capillary refill takes less than 2 seconds. Coloration: Skin is not jaundiced or pale. Findings: No bruising or rash. Neurological:      General: No focal deficit present. Mental Status: He is alert and oriented to person, place, and time. Motor: Weakness present.       Coordination: Coordination normal.      Gait: Gait normal.      Comments: Weakness overall improving,  strength 5/5   Psychiatric: Mood and Affect: Mood normal.         Behavior: Behavior normal.         Thought Content: Thought content normal.         Judgment: Judgment normal.      All labs reviewed and interpreted by me:  Labs:   Recent Labs     09/18/22 0618 09/19/22 0331 09/20/22 0518   WBC 11.8* 9.4 9.4   HGB 9.1* 8.9* 9.4*   HCT 27.6* 27.4* 30.0*    275 286       Recent Labs     09/18/22 0618 09/19/22 0331 09/20/22  0518    136 136   K 3.6 3.8 3.8    102 103   CO2 25 25 23   BUN 14 10 13   CREATININE 0.9 0.8 0.8   CALCIUM 8.2* 8.0* 8.4*       No results for input(s): AST, ALT, BILIDIR, BILITOT, ALKPHOS in the last 72 hours. No results for input(s): INR in the last 72 hours. No results for input(s): Katdami Wheeler in the last 72 hours. Urinalysis:      Lab Results   Component Value Date/Time    NITRU NEGATIVE 09/15/2022 08:50 PM    WBCUA 0-2 09/15/2022 08:50 PM    BACTERIA NONE SEEN 09/15/2022 08:50 PM    RBCUA 5-10 09/15/2022 08:50 PM    BLOODU TRACE 09/15/2022 08:50 PM    GLUCOSEU NEGATIVE 09/15/2022 08:50 PM       All radiology images and reports reviewed and interpreted by me:  Radiology:  IR GUIDED IVC FILTER PLACEMENT   Final Result   1. Status post angioplasty origin right common iliac vein with excellent result. 2. Status post successful IVC filter insertion. 3. Patient will be scheduled for follow-up visit in 3 months to discuss possible IVC filter retrieval.            **This report has been created using voice recognition software. It may contain minor errors which are inherent in voice recognition technology. **      Final report electronically signed by Dr. Alf Carmen on 9/18/2022 5:20 PM      CTA ABDOMEN PELVIS W WO CONTRAST   Final Result   1. Postop changes in the abdomen related to aortobifemoral bypass graft insertion.  Extensive intra-abdominal soft tissue stranding and chronic hematoma at the operative site and tracking into the pelvis along the psoas muscles bilaterally, worse on the    right. 2. Focal infarcts in the right kidney, unchanged from yesterday. 3. Findings of impending diarrhea. Stent in the inferior vena cava. Hydropic gallbladder. **This report has been created using voice recognition software. It may contain minor errors which are inherent in voice recognition technology. **      Final report electronically signed by Dr. Laurann Severe on 9/17/2022 3:14 PM      IR TRANSCATH PLACE INTRAVASC STENT OPEN/PERC W OR WO ANGIO EACH ADDL VEIN   Final Result   1. Extensive venous thrombosis both common iliac veins with extension into the inferior vena cava. Status post successful thromboaspiration procedure with significant improvement. .   2. Status post angioplasty of the inferior vena cava with suboptimal result. Status post successful stenting of the inferior vena cava restoring good flow lumen caliber to the IVC. **This report has been created using voice recognition software. It may contain minor errors which are inherent in voice recognition technology. **      Final report electronically signed by Dr. Laurann Severe on 9/16/2022 5:21 PM      CT ABDOMEN PELVIS WO CONTRAST Additional Contrast? None   Final Result   1. There is appearance of the aortobifemoral graft placement. 2. Large right retroperitoneal hematoma. Small left retroperitoneal hematoma. These are felt to relate to prior abdominal aortic aneurysm. 3. The inferior vena cava is compressed distally by the large right retroperitoneal hematoma. **This report has been created using voice recognition software. It may contain minor errors which are inherent in voice recognition technology. **      Final report electronically signed by Dr Trevor Fenton on 9/16/2022 10:29 AM      IR GUIDED [facility] VEIN   Final Result   1. Extensive venous thrombosis. 2. Status post successful declot/thrombectomy procedure, as outlined above.    3. Marked narrowing of the inferior vena cava, appears related to external compression. Angioplasty of the inferior vena cava and left innominate vein was performed, unfortunately, with no significant improvement. **This report has been created using voice recognition software. It may contain minor errors which are inherent in voice recognition technology. **         Final report electronically signed by Dr. Michael Brooks on 9/16/2022 11:31 AM      VL DUP LOWER EXTREMITY VENOUS BILATERAL   Final Result   1. Large acute deep venous thrombosis extending from the left common    femoral vein through the visualized veins of the calf. 2. Acute deep venous thrombosis present within the right common femoral,    proximal superficial femoral and peroneal veins. This document has been electronically signed by: Bonnie Montaño MD on    09/15/2022 08:28 PM      Technique Used: Duplex examination performed utilizing grayscale, color    and spectral analysis. CTA Chest W WO Contrast   Final Result   1. Heavy burden pulmonary emboli within the segmental and subsegmental    arteries of all 5 lobes. No evidence of right heart strain. 2. Small amount of tree-in-bud nodularity along the left lung base    suggestive of an infectious or inflammatory process including aspiration    pneumonitis. This document has been electronically signed by: Bonnie Montaño MD on    09/15/2022 07:07 PM      All CTs at this facility use dose modulation techniques and iterative    reconstructions, and/or weight-based dosing   when appropriate to reduce radiation to a low as reasonably achievable. 3D Post-processing was performed on this study. XR CHEST PORTABLE   Final Result   1. No acute cardiopulmonary abnormality. This document has been electronically signed by:  Bonnie Montaño MD on    09/15/2022 06:33 PM      IR  [address]    (Results Pending)   VL DUP LOWER EXTREMITY VENOUS BILATERAL    (Results Pending)       Diet: ADULT DIET; Regular  ADULT ORAL NUTRITION SUPPLEMENT; Breakfast, Lunch, Dinner; Standard 4 oz Oral Supplement    Moody: no    Microbiology: no    Telemetry Review of past 24 hours:  NSR, with few episodes of mild tachycardia to ~100-110 bmp    DVT prophylaxis: [] Lovenox                                 [] SCDs                                 [] SQ Heparin                                 [x] Encourage ambulation           [] Already on Anticoagulation    (Will restart anticoagulation 9/21 per CTS if stable, no SCDs on puncture sites)     Disposition:    [] Home       [] TCU       [x] Inpatient Rehab       [] Psych       [] SNF       [] [place]       [] Other-    Code Status: Full Code    PT/OT Eval Status:  Following      Electronically signed by Coretta Calero DO on 9/20/2022 at 2:29 PM

## 2022-09-20 NOTE — PLAN OF CARE
Problem: Discharge Planning  Goal: Discharge to home or other facility with appropriate resources  9/20/2022 1016 by Bernadine García RN  Outcome: Progressing  Flowsheets (Taken 9/20/2022 1016)  Discharge to home or other facility with appropriate resources:   Identify barriers to discharge with patient and caregiver   Arrange for needed discharge resources and transportation as appropriate     Problem: Pain  Goal: Verbalizes/displays adequate comfort level or baseline comfort level  9/20/2022 1016 by Bernadine García RN  Outcome: Progressing  Flowsheets (Taken 9/20/2022 1016)  Verbalizes/displays adequate comfort level or baseline comfort level:   Encourage patient to monitor pain and request assistance   Assess pain using appropriate pain scale     Problem: Safety - Adult  Goal: Free from fall injury  Outcome: Progressing  Note: Call light within reach, bed in lowest position, non skid footwear on, room door open, bed alarm on.  pt using call light appropriately.         Problem: Chronic Conditions and Co-morbidities  Goal: Patient's chronic conditions and co-morbidity symptoms are monitored and maintained or improved  Outcome: Progressing  Flowsheets (Taken 9/20/2022 1016)  Care Plan - Patient's Chronic Conditions and Co-Morbidity Symptoms are Monitored and Maintained or Improved:   Monitor and assess patient's chronic conditions and comorbid symptoms for stability, deterioration, or improvement   Collaborate with multidisciplinary team to address chronic and comorbid conditions and prevent exacerbation or deterioration   Update acute care plan with appropriate goals if chronic or comorbid symptoms are exacerbated and prevent overall improvement and discharge     Problem: ABCDS Injury Assessment  Goal: Absence of physical injury  Outcome: Progressing  Flowsheets (Taken 9/20/2022 1016)  Absence of Physical Injury: Implement safety measures based on patient assessment     Problem: Nutrition Deficit:  Goal: Optimize nutritional status  Outcome: Progressing  Flowsheets (Taken 9/20/2022 1016)  Nutrient intake appropriate for improving, restoring, or maintaining nutritional needs:   Assess nutritional status and recommend course of action   Monitor oral intake, labs, and treatment plans   Care plan reviewed with patient. Patient verbalizes understanding of the plan of care and contributes to goal setting.

## 2022-09-20 NOTE — CARE COORDINATION
9/20/22, 10:29 AM EDT    Patient goals/plan/ treatment preferences discussed by  and . Patient goals/plan/ treatment preferences reviewed with patient/ family. Patient/ family verbalize understanding of discharge plan and are in agreement with goal/plan/treatment preferences. Understanding was demonstrated using the teach back method. AVS provided by RN at time of discharge, which includes all necessary medical information pertaining to the patients current course of illness, treatment, post-discharge goals of care, and treatment preferences.      Services At/After Discharge: Inpatient rehab  Plans IPR when precerted

## 2022-09-20 NOTE — FLOWSHEET NOTE
09/20/22 1803   Safe Environment   Safety Measures Other (comment)  (virtual safety round complete)   Virtual Nurse introduced, pt up in chair, call light in reach, denies needs at this time.

## 2022-09-21 LAB
ANION GAP SERPL CALCULATED.3IONS-SCNC: 10 MEQ/L (ref 8–16)
APTT: 27.7 SECONDS (ref 22–38)
BUN BLDV-MCNC: 15 MG/DL (ref 7–22)
CALCIUM SERPL-MCNC: 8.5 MG/DL (ref 8.5–10.5)
CHLORIDE BLD-SCNC: 102 MEQ/L (ref 98–111)
CO2: 24 MEQ/L (ref 23–33)
CREAT SERPL-MCNC: 0.8 MG/DL (ref 0.4–1.2)
ERYTHROCYTE [DISTWIDTH] IN BLOOD BY AUTOMATED COUNT: 15.5 % (ref 11.5–14.5)
ERYTHROCYTE [DISTWIDTH] IN BLOOD BY AUTOMATED COUNT: 15.5 % (ref 11.5–14.5)
ERYTHROCYTE [DISTWIDTH] IN BLOOD BY AUTOMATED COUNT: 50.1 FL (ref 35–45)
ERYTHROCYTE [DISTWIDTH] IN BLOOD BY AUTOMATED COUNT: 50.1 FL (ref 35–45)
GFR SERPL CREATININE-BSD FRML MDRD: > 90 ML/MIN/1.73M2
GLUCOSE BLD-MCNC: 100 MG/DL (ref 70–108)
HCT VFR BLD CALC: 30.4 % (ref 42–52)
HCT VFR BLD CALC: 30.4 % (ref 42–52)
HCT VFR BLD CALC: 31.2 % (ref 42–52)
HEMOGLOBIN: 9.7 GM/DL (ref 14–18)
HEMOGLOBIN: 9.9 GM/DL (ref 14–18)
HEMOGLOBIN: 9.9 GM/DL (ref 14–18)
HEPARIN UNFRACTIONATED: 0.09 U/ML (ref 0.3–0.7)
HEPARIN UNFRACTIONATED: < 0.04 U/ML (ref 0.3–0.7)
INR BLD: 1.18 (ref 0.85–1.13)
MCH RBC QN AUTO: 28.5 PG (ref 26–33)
MCH RBC QN AUTO: 29.1 PG (ref 26–33)
MCHC RBC AUTO-ENTMCNC: 31.7 GM/DL (ref 32.2–35.5)
MCHC RBC AUTO-ENTMCNC: 32.6 GM/DL (ref 32.2–35.5)
MCV RBC AUTO: 89.4 FL (ref 80–94)
MCV RBC AUTO: 89.9 FL (ref 80–94)
PLATELET # BLD: 309 THOU/MM3 (ref 130–400)
PLATELET # BLD: 318 THOU/MM3 (ref 130–400)
PMV BLD AUTO: 10 FL (ref 9.4–12.4)
PMV BLD AUTO: 10 FL (ref 9.4–12.4)
POTASSIUM REFLEX MAGNESIUM: 4 MEQ/L (ref 3.5–5.2)
RBC # BLD: 3.4 MILL/MM3 (ref 4.7–6.1)
RBC # BLD: 3.47 MILL/MM3 (ref 4.7–6.1)
SODIUM BLD-SCNC: 136 MEQ/L (ref 135–145)
WBC # BLD: 10.1 THOU/MM3 (ref 4.8–10.8)
WBC # BLD: 9.8 THOU/MM3 (ref 4.8–10.8)

## 2022-09-21 PROCEDURE — APPSS30 APP SPLIT SHARED TIME 16-30 MINUTES: Performed by: PHYSICIAN ASSISTANT

## 2022-09-21 PROCEDURE — 85730 THROMBOPLASTIN TIME PARTIAL: CPT

## 2022-09-21 PROCEDURE — 99232 SBSQ HOSP IP/OBS MODERATE 35: CPT | Performed by: PHYSICAL MEDICINE & REHABILITATION

## 2022-09-21 PROCEDURE — 97530 THERAPEUTIC ACTIVITIES: CPT

## 2022-09-21 PROCEDURE — 1200000003 HC TELEMETRY R&B

## 2022-09-21 PROCEDURE — 97110 THERAPEUTIC EXERCISES: CPT

## 2022-09-21 PROCEDURE — 85018 HEMOGLOBIN: CPT

## 2022-09-21 PROCEDURE — 97535 SELF CARE MNGMENT TRAINING: CPT

## 2022-09-21 PROCEDURE — 6360000002 HC RX W HCPCS: Performed by: STUDENT IN AN ORGANIZED HEALTH CARE EDUCATION/TRAINING PROGRAM

## 2022-09-21 PROCEDURE — 97116 GAIT TRAINING THERAPY: CPT

## 2022-09-21 PROCEDURE — 2580000003 HC RX 258: Performed by: STUDENT IN AN ORGANIZED HEALTH CARE EDUCATION/TRAINING PROGRAM

## 2022-09-21 PROCEDURE — 85027 COMPLETE CBC AUTOMATED: CPT

## 2022-09-21 PROCEDURE — 80048 BASIC METABOLIC PNL TOTAL CA: CPT

## 2022-09-21 PROCEDURE — 99232 SBSQ HOSP IP/OBS MODERATE 35: CPT | Performed by: FAMILY MEDICINE

## 2022-09-21 PROCEDURE — 85014 HEMATOCRIT: CPT

## 2022-09-21 PROCEDURE — 85610 PROTHROMBIN TIME: CPT

## 2022-09-21 PROCEDURE — 85520 HEPARIN ASSAY: CPT

## 2022-09-21 PROCEDURE — 36415 COLL VENOUS BLD VENIPUNCTURE: CPT

## 2022-09-21 PROCEDURE — 6370000000 HC RX 637 (ALT 250 FOR IP): Performed by: FAMILY MEDICINE

## 2022-09-21 RX ORDER — DOXEPIN HYDROCHLORIDE 10 MG/1
10 CAPSULE ORAL NIGHTLY PRN
Status: DISCONTINUED | OUTPATIENT
Start: 2022-09-21 | End: 2022-09-22 | Stop reason: HOSPADM

## 2022-09-21 RX ORDER — HEPARIN SODIUM 1000 [USP'U]/ML
80 INJECTION, SOLUTION INTRAVENOUS; SUBCUTANEOUS ONCE
Status: DISCONTINUED | OUTPATIENT
Start: 2022-09-21 | End: 2022-09-22 | Stop reason: ALTCHOICE

## 2022-09-21 RX ORDER — HEPARIN SODIUM 10000 [USP'U]/100ML
5-30 INJECTION, SOLUTION INTRAVENOUS CONTINUOUS
Status: DISCONTINUED | OUTPATIENT
Start: 2022-09-21 | End: 2022-09-21 | Stop reason: ALTCHOICE

## 2022-09-21 RX ORDER — HEPARIN SODIUM 1000 [USP'U]/ML
80 INJECTION, SOLUTION INTRAVENOUS; SUBCUTANEOUS PRN
Status: DISCONTINUED | OUTPATIENT
Start: 2022-09-21 | End: 2022-09-22 | Stop reason: HOSPADM

## 2022-09-21 RX ORDER — HEPARIN SODIUM 10000 [USP'U]/100ML
5-30 INJECTION, SOLUTION INTRAVENOUS CONTINUOUS
Status: DISCONTINUED | OUTPATIENT
Start: 2022-09-21 | End: 2022-09-22 | Stop reason: ALTCHOICE

## 2022-09-21 RX ORDER — HEPARIN SODIUM 1000 [USP'U]/ML
40 INJECTION, SOLUTION INTRAVENOUS; SUBCUTANEOUS PRN
Status: DISCONTINUED | OUTPATIENT
Start: 2022-09-21 | End: 2022-09-22 | Stop reason: HOSPADM

## 2022-09-21 RX ADMIN — METOPROLOL TARTRATE 25 MG: 25 TABLET, FILM COATED ORAL at 19:46

## 2022-09-21 RX ADMIN — HEPARIN SODIUM 18 UNITS/KG/HR: 10000 INJECTION, SOLUTION INTRAVENOUS at 09:35

## 2022-09-21 RX ADMIN — SODIUM CHLORIDE, PRESERVATIVE FREE 10 ML: 5 INJECTION INTRAVENOUS at 08:18

## 2022-09-21 RX ADMIN — METOPROLOL TARTRATE 25 MG: 25 TABLET, FILM COATED ORAL at 08:19

## 2022-09-21 RX ADMIN — HEPARIN SODIUM 6550 UNITS: 1000 INJECTION, SOLUTION INTRAVENOUS; SUBCUTANEOUS at 16:18

## 2022-09-21 ASSESSMENT — ENCOUNTER SYMPTOMS
SHORTNESS OF BREATH: 1
NAUSEA: 0
ABDOMINAL PAIN: 0
TROUBLE SWALLOWING: 0
VOMITING: 0
RHINORRHEA: 0
DIARRHEA: 0
BACK PAIN: 0
WHEEZING: 0
SORE THROAT: 0
CONSTIPATION: 0
COUGH: 0

## 2022-09-21 NOTE — PROGRESS NOTES
[facility]  INPATIENT PHYSICAL THERAPY  DAILY NOTE  [facility] ICU STEPDOWN TELEMETRY 4K - 4K-01/001-A    Time In: 3496  Time Out: 1035  Timed Code Treatment Minutes: 63 Minutes  Minutes: 63          Date: 2022  Patient Name: Christiano Oates,  Gender:  male        MRN: [id number]  : 1960  (62 y.o.)     Referring Practitioner: Belen Nicolas DO  Diagnosis: Swelling  Additional Pertinent Hx: Christiano Oates is a 62 y.o. male with PMHx as noted below who presents to [facility] for evaluation of worsening GUZMAN, tachypnea and lower extremity swelling. Patient recently was discharged from [facility] where he was there for 2 weeks for a AAA repair. Patient was discharged to [facility] for further rehab. Reports that he arrived there on the day prior to admission around 2 PM.  On the morning of admission patient states that he tried to walk around and noticed that after just a few feet he would get short of breath. Later in the morning he noticed that his upper left leg started getting sore. He subsequently started developing hyperventilation approximately 1 hour after. EMS was called and patient was brought to the ED for further evaluation. Patient states that he has never had a blood clot before in the past.  On arrival patient was found to be afebrile, tachypnea, tachycardic and hypertensive. He was placed on 3 L of O2 via NC. CTA chest revealed heavy burden of pulmonary emboli within the segmental and subsegmental arteries of all 5 lobes. VL venous duplex ultrasound of the lower extremities revealed a large left DVT with extension from the veins in the calf to the left common femoral vein. There is also a DVT in the right common femoral, proximal superficial femoral and peroneal veins. Placed on heparin gtt.  and immediately transferred to IR where Dr. Liane Castro performed a thrombectomy of the LLE as well as angioplasty of the IVC  -- Dr. Liane Castro spoke to during standing therex today. Functional Outcome Measures: Not completed     ASSESSMENT:  Assessment: Patient progressing toward established goals. Significant time spent on pt and wife education this date, options for therapies at DC since IP rehab denied. Pt provided with written HEP. Activity Tolerance:  Patient tolerance of  treatment: good. Equipment Recommendations:Equipment Needed: No  Discharge Recommendations: Home with Home Health PT  Plan: Current Treatment Recommendations: Strengthening, Balance training, Functional mobility training, Transfer training, Endurance training, Neuromuscular re-education, Stair training, Gait training, Therapeutic activities, Patient/Caregiver education & training, Home exercise program, Safety education & training  Plan:  (5x GM)    Patient Education  Patient Education: Home Exercise Program, Family Education, Gait    Goals:  Patient goals : to get stronger  Short Term Goals  Time Frame for Short term goals: by discharge  Short term goal 1: Pt to transfer supine <--> sit mod I to enable pt to get in/out of bed. Short term goal 2: Pt to transfer sit <--> stand mod I for increased functional mobility. Short term goal 3: Pt to ambulate >500 feet without AD Independently for community re-entry. Short term goal 4: Pt to ascend/descend 12 steps with HR mod I for bedroom access. Long Term Goals  Time Frame for Long term goals : NA due to short length of stay. Following session, patient left in safe position with all fall risk precautions in place.

## 2022-09-21 NOTE — PROGRESS NOTES
Physical Medicine & Rehabilitation Progress Note    Chief Complaint: Easy fatigue, generalized weakness, shortness of breath on exertion    Subjective:    Hansel Green is a 62 y.o. right-handed  male with history of hypertension, alcoholic abuse, neck infection, status post umbilical hernia repair, status post recent abdominal aortic aneurysm repair, was admitted to [address] [place] [address] on 9/15/2022 for bilateral lower extremity DVT and extensive pulmonary embolism. The patient says he seek medical attention on 8/29/2022 because he suddenly \"not feeling well\" and was [facility] to [place].  He was found to have abdominal aortic aneurysm and underwent abdominal aortic aneurysm repair. He then was discharged to [facility] for rehabilitation treatment on 9/14/2022. On 9/15/2022 morning he started having increasing shortness of breath after he walked for few steps. He also developed left lower extremity swelling with left upper thigh soreness pain. His shortness of breath quickly worsened with hyperventilation and tachypnea. Therefore EMS was called and the patient was sent to [address] [place] [address] ER for evaluation. Her oxygen saturation was 98% on 3 L/min oxygen via nasal cannulae but he continued complaining shortness of breath. CTA of the chest done on 9/15/2022 revealed heavy burden pulmonary emboli within the segmental and subsegmental arteries of all 5 lobes. Bilateral lower extremities venous Doppler study done on 9/15/2022 also revealed large acute DVT extending from left common femoral vein through the visualized veins of calf, and acute DVT within right common femoral, proximal superficial femoral and peroneal veins. The patient was started on heparin drip. On 9/15/2022 the patient underwent thromboaspiration of the left leg veins, iliac vein and IVC, and angioplasty of IVC for marked IVC compression by KAITY Carmen.   CT of abdomen and pelvis done on 9/16/2022 revealed placed aortobifemoral graft, large right retroperitoneal hematoma compressing inferior vena cava distally and a small left retroperitoneal hematoma. Therefore IR  performed IVC stenting angioplasty, and thrombectomy on 9/16/2022. CTA of abdomen and pelvis done on 9/17/2022 revealed extensive intra-abdominal soft tissue stranding and chronic hematoma at operative site tracking into pelvis along the psoas muscles bilaterally worse on the right. Because of drop of hemoglobin/hematocrit from 10.5/32 on 9/16/2022 to 7.4/22.8 on 9/17/2022, heparin drip was held starting on 9/17/2022 due to concern of bleeding causing dropping hemoglobin and hematocrit. IVC filter therefore was recommended. The patient then underwent IVC filter placement with right common iliac vein angioplasty performed by IR Dr. Carlos Palmer on 9/18/2022. The patient says he feels well. He still experiences shortness of breath on exertion, easy fatigue and generalized weakness. He denies having any chest pain, cough, numbness or tingling sensation, or painful symptom at other part of his body. He says he had no dizziness or lightheadedness. So far he has been tolerating the rehab intervention well. Primary team is planning to restart heparin drip for anticoagulation and possibly transition to oral anticoagulant when the patient's hemoglobin and hematocrit are stable. The request for insurance approval for intensive inpatient rehab program admission was denied by patient's medical insurance because it was \"not medically necessary\". The patient's [facility]. Rehabilitation:  PT: Reviewed. (9/20/2022) : Timed Code Treatment Minutes: 23 Minutes  Activity Tolerance:  Patient tolerance of  treatment: good.         Bed Mobility:  Supine to Sit: Stand By Assistance, X 1, with head of bed raised, with rail  Sit to Supine: Stand By Assistance, X 1, with head of bed raised, with rail      Transfers:  Sit to Stand: [address], X 1; several completed during session  Stand to [facility], X 1     Ambulation:  Contact Guard Assistance, X 1  Distance: 40 feet  Surface: Level Tile  Device:No Device  Gait Deviations:  Decreased Step Length Bilaterally, Decreased Gait Speed, and Mild Path Deviations  **Slow pace, increased fatigue after short distance ambulation     Balance:  Static Sitting Balance:  Stand By Assistance  Static Standing Balance: Contact Guard Assistance  Dynamic Standing Balance: Contact Guard Assistance  **Pt perform standing reaching tasks, reaches within JONNIE with 1 UE with CGA for balance, second trial perform B shoulder rolls and scap retraction; standing tolerance ~1 minute prior to fatigue      OT: Reviewed. Timed Code Treatment Minutes: 23 Minutes  Activity Tolerance:  Patient tolerance of  treatment: fair. ADL:   Grooming: Stand By Assistance. With CGA for more dynamic components. Lower Extremity Dressing: Dependent. To don socks  Toileting: Contact Guard Assistance. Toilet Transfer: Stand By Assistance. Pinky Angles BALANCE:  Sitting Balance:  Stand By Assistance. Standing Balance: Stand By Assistance, Contact Guard Assistance-for dynamic components. BED MOBILITY:  Supine to Sit: Stand By Assistance    Sit to Supine: Stand By Assistance    Scooting: Stand By Assistance       TRANSFERS:  Sit to Stand:  Stand By Assistance. Stand to Sit: Stand By Assistance. FUNCTIONAL MOBILITY:  Assistive Device: None  Assist Level:  Contact Guard Assistance. Distance: To and from bathroom and short [facility] distance in the montemayor. ST: Not requested      Review of Systems:  Review of Systems   Constitutional:  Positive for fatigue. Negative for chills, diaphoresis and fever. HENT:  Negative for hearing loss, rhinorrhea, sneezing, sore throat and trouble swallowing.     Respiratory:  Positive for shortness of vein thrombosis especially on the left side and extensive bilateral pulmonary embolism requiring left lower extremity venous thromboaspiration and IVC filter placement  Status post abdominal aortic aneurysm repair  Bilateral lower extremities acute DVT and extensive bilateral pulmonary embolism requiring left lower extremity venous thromboaspiration and IVC filter placement  Large right retroperitoneal hematoma causing IVC compression requiring IVC angioplasty and stenting  Hypertension  History of heavy alcoholic usage  History of umbilical hernia repair    The patient's physical function has more improvement yesterday compared to the day before. We will continue providing PT and OT treatment to the patient while the patient remains in acute hospital.  Further improvement is expected. He would benefit from continuing rehabilitation treatment to improve his function preferably on a daily basis when he is ready to be discharged from acute hospital.  However the patient's medical insurance denies the patient to be admitted to inpatient rehab unit for intensive inpatient rehabilitation treatment program.  Subacute rehabilitation treatment program in SNF will be the next best option for the patient as discharge rehab plan. I agree with possibly placement at [facility] for subacute rehab treatment program as discharge rehabilitation plan. Plan:  Continue PT and OT treatment while the patient remains in acute hospital  Waiting for primary team to restart heparin drip for anticoagulation and eventual transition to oral anticoagulant  Patient's medical insurance denied the request for inpatient rehab admission for intensive inpatient rehabilitation treatment program.  Subacute rehabilitation treatment program in SNF will be the next best option for the patient as discharge rehab plan.     Agree with possibly placement at [facility] for subacute rehab treatment program as discharge rehabilitation plan.       Jessy Avery MD

## 2022-09-21 NOTE — CARE COORDINATION
9/21/22, 8:11 AM EDT  DISCHARGE PLANNING EVALUATION    VÍCTOR called spouse Edie Coombs and notified her that precert was denied for inpatient rehab due to \"not medically necessary\". Edie Coombs reported that, that was upsetting to her but verbalized understanding. She asked for [facility] to review information again. VÍCTOR called and left voicemail for Myriam Schmidt at [facility] asking him to re-review referral for patient. 9/21/22, 10:51 AM EDT  DISCHARGE PLANNING EVALUATION    SW called wife Edie Coombs and she reported that she has spoken with the PT and she is comfortable with patient returning home with [facility]. SW called and made a referral to [address] [place] [address].

## 2022-09-21 NOTE — CARE COORDINATION
Collaborative Discharge Planning    [place] Ates  :  1960  MRN:  [id number]    ADMIT DATE:  9/15/2022      Discharge Planning Discharge Planning  Type of Residence: Long-Term Acute Care, Skilled Nursing Facility  Living Arrangements: Spouse/Significant Other  Support Systems: Spouse/Significant Other  Current Services Prior To Admission: None  Potential Assistance Needed: N/A  DME Ordered?: No  Potential Assistance Purchasing Medications: No  Type of Home Care Services: None  Patient expects to be discharged to[de-identified] Skilled nursing facility  Follow Up Appointment: Best Day/Time : Thursday AM  One/Two Story Residence: One story  History of falls?: No  White Board Notes /Social Work Whiteboard Notes  /Social Work Whiteboard: ; SW - Referral to Inpatient Rehab. Precert. Referral to [facility]. Precert. From [facility]. Unsure if he wants to return.     Discharge Plan SNF  from [facility]; IPR denied, will need new possible [facility] SNF (precert, therapy following)  Discharge Milestones and Delays: Authorization/Insurance Delay  B/L Pulmonary Emboli/BLE DVT  History: Alcohol Use/Smoker, AAA Repair 2 weeks ago [facility])  9/15 IVC/LLE Thromboaspiration/Thrombectomy   Left IVC Stent Placement (compressed IVC)   IVC w Angioplasty     Await SNF acceptance, then need precert  (day 1)      SIGNED:  Eve Anderson RN   2022, 7:14 AM

## 2022-09-21 NOTE — PROGRESS NOTES
PROGRESS NOTE      Patient:  Adilene Reyes    Unit/Bed:4-01/001-A    YOB: 1960    MRN: [id number]     Acct: [de-identified]    PCP: WENDI Lemus    Date of Admission: 9/15/2022 LOS: 6    Date of Evaluation:  9/21/2022    Anticipated Discharge:  Likely Thursday or Friday if tolerating heparin gtt --> OAC    Assessment/Plan:    Bilateral Multilobular Pulmonary Embolism and Bilateral Proximal DVTs: Confirmed on CTA chest 9/15/22 and dopplers of BLE 9/15/22 --> PE's  noted to be heavy burden w/in segmental and subsegmental of all 5 lobes but no right heart strain. BP has remained stable, however Hgb dropped to 7.2 over 2 days from 12.8 at admission 9/15/22. Bilateral DVTs are proximal with left > right. Was hospitalized in [place] for 2 weeks for AAA repair (8/29 - 9/13)  - Patient placed on heparin gtt and underwent LLE thrombectomy by Dr. Carlos Palmer 9/15 and then IVC stent placement 9/16 with additional thromboaspiration then 9/18 IVC filter placement  - Echo 9/16/22 w/o RV strain with extensive PEs, EF 40% with mod global hypokinesis, grade 1 DD  - Heparin gtt started on admission -> stopped 9/18 per CTS for concern of bleeding with drop in h/h, pt received PRBC x2  - S/p IVC filter placement 9/18 -> CTS recs to resume heparin 9/21  - Encourage IS, wean O2 as able --> on 2L NC as of 9/20/2022   - 9/21 resume heparin gtt per CTS recommendations. Potentially transition to eliquis 9/22 - 9/23 if H&H remains stable  - CBC and BMP daily   - 9/21 IPR denied. Strength improving - PT/OT recommending home with home health PT/OT. Acute likely blood loss Anemia --- worsening 9/17 --  Drop in Hgb from 12.8 on 9/15 to 10.5 on 9/16 to 7.4-7.2 on 9/17.  CTA abd per CTS with findings have not identified an acute bleed 9/16 nor 9/17, however the patient has bilateral retroperitoneal hematomas (POA)  -- ?due to heparin and cont bleeding vs ??loss due to recurrent thromboaspiration of BLE and IVC clots  - Consulted Thoracic surgery --> per Dr. Garcia Rodriguez pt Transfused 2 units PRBC 9/17 and hgb up to 9.1  - 9/20 Hgb improved to 9.4  - Daily CBC  - Lopressor 25 mg BID for BP control  - ?need to r/o GI etiology, will consider FOBT if Hgb drops again  - Transfuse <7  - no signs of hemolysis 9/20  - Per CTS, okay to resume heparin gtt 9/21. Plan transition to eliquis on 9/22. LV dysfunction with diastolic dysfunction --  Given extensive clot history, this may be an acute development, however chronic cardiac dysfunction cannot be excluded entirely. ECHO from 9/16/ss with moderatedly reduced systolic function and EF of 40% as well as grade 1 diastolic dysfunction.  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 Status post successful thromboaspiration procedure with significant improvement and s/p agnioplasty of IVC and s/p stenting of IVC restoring good flow lumen caliber to IVC  - 9/18 Per IR, inferior vena cava filter insertion , and angioplasty right common iliac vein, successful with minimal EBL     AGNES, Pre-Renal etiology, resolved: up to 2.1 on admission 9/15 --> FeNa 0.3% 9/15. Recently hospitalized at [facility], s/p AAA rupture repair, ?heavy contrast exposure? --> noted per CTA abd 9/17 of focal infarcts in right kidney -- Cr baseline unknown.   - IV NS stopped 9/19 evening, continue encouraging PO hydration  - 9/17 got contrast CTA, monitor Cr for rise in few days  - 9/20 Cr stable at 0.8 again  - Holding ACE inhibitor  - Lopressor 25 mg BID for BP control  - Daily weights, I/Os, BMP     Leukocytosis, resolved: afeb - CT chest on admission with small amount of tree-in-bud nodularity along the left lung base suspicious for aspiration pneumonia vs PE inflammation. Afebrile. No history of recent cough. No diarrhea and no acute findings of infxn on CT abd  - Procalcitonin elevation likely due to decreased renal clearance but ??due to pneumonia   - Empiric IV Rocephin started on admission 9/15, completed 9/19  - 9/20 WBC WNL at 9.4, stable  - u/a 9/15 (-), no BC done on admission  - Daily CBC     Possible pneumonia LLL  -- CTA chest on admission 9/15/22 = Small amount of tree-in-bud nodularity along the left lung base suggestive of an infectious or inflammatory process including aspiration pneumonitis   - PCT elevated 0.54 and WBC up to 24.5 on admission 9/15 thus will treat as infxn with recent extensive admission at [facility]  - Completed IV Rocephin x 5days from 9/16--> 9/19  - 9/20 WBC at 9.4 WNL again  - Will consider repeat imaging if condition worsens  - 9/21 denies cough or SOB     Combined Metabolic Alkalosis with Respiratory Alkalosis, improved: Due to AGNES and tachypnea from PE - improving and WNL on 9/20 - monitor     Mild Asymptomatic Hyponatremia, resolved: Etiology likely AGNES and per [facility] records pt was d/c with HCTZ?? -- holding any diuretics and improved with IVF to 136 on 9/19 from 129 on admission 9/15 -- stopped IVF 9/19 evening.   - 9/20 Na WNL at 136  - Daily BMP     Mild Asymptomatic Hyperkalemia 2/2 ACE-I Therapy, resolved: K 5.6 on arrival.   - Holding ACE inhibitor   - IVF started on admission  - Stable EKG 9/19  - 9/20 K stable at 3.8 again   - Daily BMP     Sinus tachycardia -- due to above -monitor tele - ?BB as was on lopressor at d/c per d/c summary at [facility] -- 9/19 EKG with NSR, HR at 98--- continue with Lopressor 25 mg BID, ?increase if HR not at goal vs adding lisinopril?      Thrombocytosis -- likely reactive -- platelets stable 3/59 at 286 off heparin gtt - will monitor, and per CTS consider restarting heparin if stable 9/21     Recent AAA rupture s/p emergent Repair: Performed at [facility] 8/29/22 with +cardiac arrest also noted per notes reviewed and had returned to OR 8/31/22 for a washout   - Consulted thoracic surgery with above and apprec assist  - s/p 27 units PRBC, 28 units FFP, 5 cryo, 3 packs plts noted per record review at [facility]  - no active bleeding from aorta per CTA abd 9/17/22 but has residual bilateral RP hematomas  - s/p 2 units PRBC 9/17 per CTS after hgb dropped to 9/17, heparin gtt stopped  - 9/20 CTS okay with restarting heparin 9/21 if Hgb remains stable  - 9/20 lopressor 25 mg BID for tight BP control s/p AAA rupture/repair     Essential HTN -- ?on lisinopril at [facility] - per d/c summary from [facility] pt was dc with metoprolol and HCTZ -- tight control with recent AAA rupture -- 9/20 on Lopressor 25 mg BID for HR and BP control, ?consider increase if HR and/or BP remain elevated above goal?     Hx ETOH abuse - LFT WNL - no ETOH since surgery 8/29/22     Tobacco abuse - +smoker prior to AAA rupture -> cont encourage cessation     Deconditioning -- from recent hospitalization at [facility] and currently - was d/c to [facility] from [facility] -> PT/OT c/s and treating-- PMR consulted w/ recs for inpatient rehab, pre-certification pending. Moderate Malnourishment: Dietary following -  Moderate malnutrition noted 9/19 due to acute illness, continue Ensure TID with full adult diet. Chief Complaint: GUZMAN/SOB, tachypnea, leg swelling    Hospital Course: per H&P   \" Mitch Liz is a 62 y.o. male with PMHx as noted below who presents to [address] [place] [address] for evaluation of worsening GUZMAN, tachypnea and lower extremity swelling. Patient recently was discharged from [facility] where he was there for 2 weeks for a AAA repair. Patient was discharged to [facility] for further rehab.   Reports that he arrived there on the day prior to admission around 2 PM.  On the morning of admission patient states that he tried to walk around and noticed that after just a few feet he would get short of breath. Later in the morning he noticed that his upper left leg started getting sore. He subsequently started developing hyperventilation approximately 1 hour after. EMS was called and patient was brought to the ED for further evaluation. Patient states that he has never had a blood clot before in the past.    Per prior progress notes  \"Summarized ED workup and finding  -- On arrival patient was found to be afebrile, tachypnea, tachycardic and hypertensive. He was placed on 3 L of O2 via NC.  -- Labs revealed a mild hyponatremia, mild hyperkalemia, prerenal AGNES, reactive leukocytosis, reactive thrombocythemia  -- EKG revealed sinus tachycardia  -- CTA chest revealed heavy burden of pulmonary emboli within the segmental and subsegmental arteries of all 5 lobes. VL venous duplex ultrasound of the lower extremities revealed a large left DVT with extension from the veins in the calf to the left common femoral vein. There is also a DVT in the right common femoral, proximal superficial femoral and peroneal veins  --Placed on heparin gtt. and immediately transferred to IR where Dr. Elias De Leon performed a thrombectomy of the LLE as well as angioplasty of the IVC  -- Dr. Elias De Leon spoke to this resident over telephone; patient has what appears to be a compressed IVC which was unable to be opened up via angioplasty.   Recommends stenting tomorrow morning     Pertinent/Relevant History  -- Patient was recently discharged from [facility] for a AAA repair  -- He was then transferred to [facility] for rehab where on the day before admission he had the following events occur in chronologic order: proximal left leg pain, left leg swelling, GUZMAN and then tachypnea\"     9/16 - CT abd pelvis w/o contrast this AM, Bilateral retroperitoneal hematoma R>L which may relate to prior AAA, and IVC compressed distally by right retroperitoneal hematoma. ICV stent today, successful. Leukocytosis improving today, will follow, empiric Rocephin. NS at 125 ml/hr perioperatively from IR stenting, will begin weaning tomorrow with full diet. Cr improving to 1.8, Na to 131, K WNL at 4.5.     9/17 - Hemoglobin dropped this AM from 10.5 to 7.4, repeat 7.2. Consulted Vascular surgery. Patient was consented for blood products and transfused, H&H trends ordered. Abd CTA w/wo contrast taken, revealed stable post-surgical changes compared to CT 9/16. Will will continue the heparin gtt despite drop in Hgb of unclear etiology given his extensive clotting with bilateral PE and bilateral DVTs. IR took out R groin slip knot, PT/OT consulted for tomorrow morning. Cr improving to 1.3, Na improving to 132, K WNL at 3.5. \"    \"9/18/2022  --> pt w/o new c/o - denies any cp. No abd pain. C/o some numbness and tingling in right leg but no pain. Felix po. No n/v. Denies f/c. On 3L O2. Afebrile. Hasn't gotten OOB yet. Last BM 9/18 - -2.2 L since admission\"    Plan of Care 9/18 per Hospitalist -- \"d/w Dr. Honorio Martinez concern for bleeding on heparin gtt with drop in h/h yesterday to 7.2 from 12.8 on 9/15 and 10.5 on 9/16 --> he stopped heparin gtt and CTA abd/pelvis done and cont with RP hematoma (present on admission) but with tracking to psoas muscles bilaterally R>L. Thus Dr. Honorio Martinez recommending IVC filter as plan to continue to hold heparin at this time - d/w Dr. Luis Francis IR as pt with recent IVC stent placement - he will place IVC filter today and then will await CTS recs when feel comfortable resuming heparin/anticoagulation given pt's extensive DVT's and Pe's. Cont monitor h/h. Procedure d/w pt and he is agreeable. Further risks of procedure to be discussed with pt by Dr. Luis Francis. \" Per Op note \" inferior vena cava filter insertion , and angioplasty right common iliac vein, successful, minimal blood loss, no immediate complications\"    3/07 - S/p IVC filter placement 9/18.  He received 2 units of blood 9/17 and his Hbg remains stable today at 8.9. We are holding anticoagulation therapy at this time due to concern for bleeding. Daily BMP and CBC in AM. Consulted PM&R as PT/OT recommend inpatient rehab. Dietary recommends supplemental ensure due to moderate malnourishment. Pt set to complete final day of Rocephin today. CTS and PM&R following. Will stop continuous NS if condition remains stable and PO intake is good given moderate malnutrition. 9/20 - CTS recommends that we can resume heparin therapy 9/21 from their standpoint, s/p IVC filter 9/18. The patient's Hgb is improving, now at 9.4, s/p PRBC transfusion x2 9/17 and IVF was stopped 9/19 evening. Case management and social work are working on pre-certification for inpatient rehab per PM&R and PT/OT recommendations. Rocephin x 5 days completed 9/19, WBC WNL at 9.4 again 9/20. Started 25 mg Lopressor BID, BP stable and 129/82 as of noon 9/20. Daily CBC and BMP, and if stable tomorrow will restart anticoagulation. 9/21 - heparin resumed and trend H&H. If stable, will transition to eliquis on 9/22. Doing well today. Subjective/HPI:   Mitch Liz is doing well today. IPR denied as he is ambulating very well and having good progress with PT/OT. He denies any lightheadedness, dizziness, headaches, SOB, or chest pain. Denies leg pain. PT/OT assessment today recommending Home Health with PT/OT now. If hemoglobin remains stable, will transition to eliquis tomorrow and possibly home. PMH, SURGICAL HX, FH, SOCIAL HX reviewed and updated as needed.     Medications:  Reviewed    Infusion Medications    heparin (PORCINE) Infusion 22 Units/kg/hr (09/21/22 1622)    sodium chloride      sodium chloride      sodium chloride       Scheduled Medications    heparin (porcine)  80 Units/kg IntraVENous Once    metoprolol tartrate  25 mg Oral BID    sodium chloride flush  5-40 mL IntraVENous 2 times per day     PRN Meds: doxepin, heparin (porcine), heparin (porcine), sodium chloride, sodium chloride, potassium chloride **OR** potassium alternative oral replacement **OR** potassium chloride, magnesium sulfate, sodium chloride flush, sodium chloride, ondansetron **OR** ondansetron, polyethylene glycol, acetaminophen **OR** acetaminophen, docusate sodium, morphine **OR** morphine      Intake/Output Summary (Last 24 hours) at 9/21/2022 1633  Last data filed at 9/21/2022 1538  Gross per 24 hour   Intake 1105.04 ml   Output 2475 ml   Net -1369.96 ml       Exam:  /83   Pulse (!) 101   Temp 97.3 °F (36.3 °C) (Oral)   Resp 18   Ht 6' (1.829 m)   Wt 180 lb 8 oz (81.9 kg)   SpO2 94%   BMI 24.48 kg/m²     Physical Exam  Vitals and nursing note reviewed. Constitutional:       General: He is not in acute distress. Appearance: Normal appearance. He is not ill-appearing, toxic-appearing or diaphoretic. HENT:      Head: Normocephalic and atraumatic. Nose: Nose normal. No rhinorrhea. Mouth/Throat:      Mouth: Mucous membranes are moist.      Pharynx: Oropharynx is clear. Eyes:      General: No scleral icterus. Extraocular Movements: Extraocular movements intact. Conjunctiva/sclera: Conjunctivae normal.   Cardiovascular:      Rate and Rhythm: Normal rate and regular rhythm. Pulses: Normal pulses. Heart sounds: Normal heart sounds. No murmur heard. No friction rub. No gallop. Pulmonary:      Effort: Pulmonary effort is normal. No respiratory distress. Breath sounds: Normal breath sounds. No wheezing, rhonchi or rales. Abdominal:      General: Abdomen is flat. Bowel sounds are normal. There is no distension. Palpations: Abdomen is soft. There is no mass. Tenderness: There is no abdominal tenderness. Musculoskeletal:         General: No swelling, tenderness, deformity or signs of injury. Normal range of motion. Cervical back: Normal range of motion. No rigidity or tenderness. Skin:     General: Skin is warm and dry. Capillary Refill: Capillary refill takes less than 2 seconds. Coloration: Skin is not jaundiced or pale. Findings: No bruising or rash. Neurological:      General: No focal deficit present. Mental Status: He is alert and oriented to person, place, and time. Motor: Weakness (improving) present. Coordination: Coordination normal.      Gait: Gait normal.      Comments: Weakness overall improving,  strength 5/5   Psychiatric:         Mood and Affect: Mood normal.         Behavior: Behavior normal.         Thought Content: Thought content normal.         Judgment: Judgment normal.      All labs reviewed and interpreted by me:  Labs:   Recent Labs     09/20/22  0518 09/21/22  0314 09/21/22  0912 09/21/22  1537   WBC 9.4 10.1 9.8  --    HGB 9.4* 9.9* 9.9* 9.7*   HCT 30.0* 30.4* 31.2* 30.4*    309 318  --      Recent Labs     09/19/22  0331 09/20/22 0518 09/21/22  0314    136 136   K 3.8 3.8 4.0    103 102   CO2 25 23 24   BUN 10 13 15   CREATININE 0.8 0.8 0.8   CALCIUM 8.0* 8.4* 8.5     No results for input(s): AST, ALT, BILIDIR, BILITOT, ALKPHOS in the last 72 hours. Recent Labs     09/21/22  0912   INR 1.18*     No results for input(s): Rhae Childes in the last 72 hours. Urinalysis:      Lab Results   Component Value Date/Time    NITRU NEGATIVE 09/15/2022 08:50 PM    WBCUA 0-2 09/15/2022 08:50 PM    BACTERIA NONE SEEN 09/15/2022 08:50 PM    RBCUA 5-10 09/15/2022 08:50 PM    BLOODU TRACE 09/15/2022 08:50 PM    GLUCOSEU NEGATIVE 09/15/2022 08:50 PM       All radiology images and reports reviewed and interpreted by me:  Radiology:  IR GUIDED IVC FILTER PLACEMENT   Final Result   1. Status post angioplasty origin right common iliac vein with excellent result. 2. Status post successful IVC filter insertion.    3. Patient will be scheduled for follow-up visit in 3 months to discuss possible IVC filter retrieval. **This report has been created using voice recognition software. It may contain minor errors which are inherent in voice recognition technology. **      Final report electronically signed by Dr. Brandy Dixon on 9/18/2022 5:20 PM      CTA ABDOMEN PELVIS W WO CONTRAST   Final Result   1. Postop changes in the abdomen related to aortobifemoral bypass graft insertion. Extensive intra-abdominal soft tissue stranding and chronic hematoma at the operative site and tracking into the pelvis along the psoas muscles bilaterally, worse on the    right. 2. Focal infarcts in the right kidney, unchanged from yesterday. 3. Findings of impending diarrhea. Stent in the inferior vena cava. Hydropic gallbladder. **This report has been created using voice recognition software. It may contain minor errors which are inherent in voice recognition technology. **      Final report electronically signed by Dr. Brandy Dixon on 9/17/2022 3:14 PM      IR TRANSCATH PLACE INTRAVASC STENT OPEN/PERC W OR WO ANGIO EACH ADDL VEIN   Final Result   1. Extensive venous thrombosis both common iliac veins with extension into the inferior vena cava. Status post successful thromboaspiration procedure with significant improvement. .   2. Status post angioplasty of the inferior vena cava with suboptimal result. Status post successful stenting of the inferior vena cava restoring good flow lumen caliber to the IVC. **This report has been created using voice recognition software. It may contain minor errors which are inherent in voice recognition technology. **      Final report electronically signed by Dr. Brandy Dixon on 9/16/2022 5:21 PM      CT ABDOMEN PELVIS WO CONTRAST Additional Contrast? None   Final Result   1. There is appearance of the aortobifemoral graft placement. 2. Large right retroperitoneal hematoma. Small left retroperitoneal hematoma. These are felt to relate to prior abdominal aortic aneurysm.    3. The inferior vena cava is compressed distally by the large right retroperitoneal hematoma. **This report has been created using voice recognition software. It may contain minor errors which are inherent in voice recognition technology. **      Final report electronically signed by Dr Toni Slater on 9/16/2022 10:29 AM      IR GUIDED [facility] VEIN   Final Result   1. Extensive venous thrombosis. 2. Status post successful declot/thrombectomy procedure, as outlined above. 3. Marked narrowing of the inferior vena cava, appears related to external compression. Angioplasty of the inferior vena cava and left innominate vein was performed, unfortunately, with no significant improvement. **This report has been created using voice recognition software. It may contain minor errors which are inherent in voice recognition technology. **         Final report electronically signed by Dr. Jose Rosenthal on 9/16/2022 11:31 AM      VL DUP LOWER EXTREMITY VENOUS BILATERAL   Final Result   1. Large acute deep venous thrombosis extending from the left common    femoral vein through the visualized veins of the calf. 2. Acute deep venous thrombosis present within the right common femoral,    proximal superficial femoral and peroneal veins. This document has been electronically signed by: Solomon Edwards MD on    09/15/2022 08:28 PM      Technique Used: Duplex examination performed utilizing grayscale, color    and spectral analysis. CTA Chest W WO Contrast   Final Result   1. Heavy burden pulmonary emboli within the segmental and subsegmental    arteries of all 5 lobes. No evidence of right heart strain. 2. Small amount of tree-in-bud nodularity along the left lung base    suggestive of an infectious or inflammatory process including aspiration    pneumonitis. This document has been electronically signed by:  Solomon Edwards MD on    09/15/2022 07:07 PM      All CTs at this facility use dose modulation techniques and iterative    reconstructions, and/or weight-based dosing   when appropriate to reduce radiation to a low as reasonably achievable. 3D Post-processing was performed on this study. XR CHEST PORTABLE   Final Result   1. No acute cardiopulmonary abnormality. This document has been electronically signed by: Yary Gregg MD on    09/15/2022 06:33 PM      IR  [address]    (Results Pending)   VL DUP LOWER EXTREMITY VENOUS BILATERAL    (Results Pending)       Diet: ADULT DIET; Regular  ADULT ORAL NUTRITION SUPPLEMENT; Breakfast, Lunch, Dinner; Standard 4 oz Oral Supplement    Moody: no    Microbiology: no    Telemetry Review of past 24 hours:  NSR, with few episodes of mild tachycardia to ~100-110 bmp    DVT prophylaxis: [] Lovenox                                 [] SCDs                                 [] SQ Heparin                                 [] Encourage ambulation           [x] Already on Anticoagulation - heparin gtt       Disposition:    [x] Home with [facility]       [] TCU       [] Inpatient Rehab       [] Psych       [] SNF       [] [place]       [] Other-    Code Status: Full Code    PT/OT Eval Status:  Following      Electronically signed by Mp Fernandez DO on 9/21/2022 at 4:33 PM

## 2022-09-21 NOTE — PROGRESS NOTES
Precert denied for IPR denial letter states \"not medically necessary\" for this level of care. RADHA Jones updated. Dr. George Lane updated.

## 2022-09-21 NOTE — PROGRESS NOTES
[facility] 4[facility]  Occupational Therapy  Daily Note  Time:   Time In: 4487  Time Out: 1422  Timed Code Treatment Minutes: 23 Minutes  Minutes: 23          Date: 2022  Patient Name: Lico Cueva,   Gender: male      Room: [facility]  MRN: [id number]  : 1960  (62 y.o.)  Referring Practitioner: Ke Harry DO  Diagnosis: swelling  Additional Pertinent Hx: Lico Cueva is a 62 y.o. male who presents to the emergency department for evaluation of shortness of breath and left upper leg pain. The patient states that he recently had a abdominal aortic aneurysm repair at the end of August at [place].  While at rehab today, he developed sudden onset left upper leg pain followed by shortness of breath. His left lower extremity is noticeably more swollen than the right and appears darkly erythematous and mottled. He is currently complaining of shortness of breath. Found to have extensive BLE DVT's and multifocal Pe's -- s/p LLE thromboaspiration by IR on admission -- found to have compression of IVC. CT abd found to have aortobifem graft, large right RP hematoma and small on left, IVC compressed distally by large right RP hematoma. Pt s/p IVC filter placement on 22    Restrictions/Precautions:  Restrictions/Precautions: Fall Risk     SUBJECTIVE: RN okayed OT session. Pt. In room supine pleasant and agreeable to participate. PAIN: soreness in abdomen    Vitals: Vitals not assessed per clinical judgement, see nursing flowsheet    COGNITION: WNL    ADL:   Lower Extremity Dressing: Stand By Assistance, with set-up, with verbal cues , and with increased time for completion. With LHAE use . BALANCE:  Sitting Balance:  Modified Independent. Seated on EOB  Standing Balance: Stand By Assistance. BED MOBILITY:  Supine to Sit: Supervision    Sit to Supine: Supervision      TRANSFERS:  Sit to Stand:  Stand By Assistance.     Stand to Sit: Stand By Assistance. FUNCTIONAL MOBILITY:  Assistive Device: None  Assist Level:  Stand By Assistance. Distance:  [facility] distance in [place]        ADDITIONAL ACTIVITIES:  Pt. Instructed on LHAE use to complete LB dressing and trialed while seated on  EOB. Pt. Reports at this time will have spouse assist prn. Pt. Provided with information of where to purchase AE if needed upon return home. Pt. Instructed on ECTs in the home and adaptive strategies to incorporate into daily routine to increase independence and safety with ADLs. Pt. Voices an understanding. ASSESSMENT:     Activity Tolerance:  Patient tolerance of  treatment: good. Discharge Recommendations: Home with assist as needed and Home with Home Health OT  Equipment Recommendations: Equipment Needed: No  Other: defer to next level of care  Plan: Times per Week: 5x  Current Treatment Recommendations: Strengthening, Balance training, Functional mobility training, Endurance training, Patient/Caregiver education & training, Equipment evaluation, education, & procurement, Self-Care / ADL, Home management training    Patient Education  Patient Education: ADL's, IADL's, Home Safety, Importance of Increasing Activity, and Assistive Device Safety     Goals  Short Term Goals  Time Frame for Short term goals: by discharge  Short Term Goal 1: Pt will increase activity tolerance for functional mobility to/from bathroom, progressing to household distances, with supervision and Sp02 >=90% in prep for ADL completion. Short Term Goal 2: Pt will complete BADL tasks with supervision, incorporating ECT prn, to increase independence and ease with self care tasks. Short Term Goal 3: Pt will tolerate dynamic standing X5 minutes with supervision in prep for sinkside grooming tasks. Following session, patient left in safe position with all fall risk precautions in place.

## 2022-09-21 NOTE — CARE COORDINATION
Collaborative Discharge Planning    Lico Cueva  :  1960  MRN:  [id number]    ADMIT DATE:  9/15/2022      Discharge Planning Discharge Planning  Type of Residence: Long-Term Acute Care, Skilled Nursing Facility  Living Arrangements: Spouse/Significant Other  Support Systems: Spouse/Significant Other  Current Services Prior To Admission: None  Potential Assistance Needed: N/A  DME Ordered?: No  Potential Assistance Purchasing Medications: No  Type of Home Care Services: None  Patient expects to be discharged to[de-identified] Skilled nursing facility  Follow Up Appointment: Best Day/Time : Thursday AM  One/Two Story Residence: One story  History of falls?: No  White Board Notes /Social Work Whiteboard Notes  /Social Work Whiteboard: ; SW - [facility]. Need HH order.     Discharge Plan Home with home health  from [facility]; IPR denied, plans new [facility] (nsg, therapy); therapy following; ambulates 280 feet w therapy    Discharge Milestones and Delays: Clinical status    B/L Pulmonary Emboli/BLE DVT  History: Alcohol Use/Smoker, AAA Repair 2 weeks ago [facility])  9/15 IVC/LLE Thromboaspiration/Thrombectomy   Left IVC Stent Placement (compressed IVC)   IVC w Angioplasty  Heparin gtt continued  Await [facility] plans     SIGNED:  Melanie Santillan RN   2022, 2:38 PM

## 2022-09-21 NOTE — PLAN OF CARE
Problem: Discharge Planning  Goal: Discharge to home or other facility with appropriate resources  9/21/2022 1243 by Caitlin Sky RN  Outcome: Progressing  Flowsheets (Taken 9/21/2022 1243)  Discharge to home or other facility with appropriate resources:   Identify barriers to discharge with patient and caregiver   Identify discharge learning needs (meds, wound care, etc)   Refer to discharge planning if patient needs post-hospital services based on physician order or complex needs related to functional status, cognitive ability or social support system   Arrange for needed discharge resources and transportation as appropriate     Problem: Pain  Goal: Verbalizes/displays adequate comfort level or baseline comfort level  9/21/2022 1243 by Caitlin Sky RN  Outcome: Progressing  Flowsheets (Taken 9/21/2022 1243)  Verbalizes/displays adequate comfort level or baseline comfort level:   Encourage patient to monitor pain and request assistance   Assess pain using appropriate pain scale   Implement non-pharmacological measures as appropriate and evaluate response   Consider cultural and social influences on pain and pain management  Note: Pain goal zero      Problem: Safety - Adult  Goal: Free from fall injury  9/21/2022 1243 by Caitlin Sky RN  Outcome: Progressing  Flowsheets (Taken 9/21/2022 1243)  Free From Fall Injury:   Instruct family/caregiver on patient safety   Based on caregiver fall risk screen, instruct family/caregiver to ask for assistance with transferring infant if caregiver noted to have fall risk factors  Note: Hourly rounding, call light within reach, bed alarm in use     Problem: Chronic Conditions and Co-morbidities  Goal: Patient's chronic conditions and co-morbidity symptoms are monitored and maintained or improved  9/21/2022 1243 by Caitlin Sky RN  Outcome: Progressing  Flowsheets (Taken 9/21/2022 1243)  Care Plan - Patient's Chronic Conditions and Co-Morbidity Symptoms are Monitored and Maintained or Improved:   Monitor and assess patient's chronic conditions and comorbid symptoms for stability, deterioration, or improvement   Collaborate with multidisciplinary team to address chronic and comorbid conditions and prevent exacerbation or deterioration   Update acute care plan with appropriate goals if chronic or comorbid symptoms are exacerbated and prevent overall improvement and discharge     Problem: ABCDS Injury Assessment  Goal: Absence of physical injury  9/21/2022 1243 by Gricel Medina RN  Outcome: Progressing  Flowsheets (Taken 9/21/2022 1243)  Absence of Physical Injury: Implement safety measures based on patient assessment  Note: Hourly rounding, call light within  reach, bed alarm in use      Problem: Nutrition Deficit:  Goal: Optimize nutritional status  9/21/2022 1243 by Gricel Medina RN  Outcome: Progressing  Flowsheets (Taken 9/21/2022 1243)  Nutrient intake appropriate for improving, restoring, or maintaining nutritional needs:   Assess nutritional status and recommend course of action   Monitor oral intake, labs, and treatment plans   Recommend appropriate diets, oral nutritional supplements, and vitamin/mineral supplements     Problem: Cardiovascular - Adult  Goal: Maintains optimal cardiac output and hemodynamic stability  9/21/2022 1243 by Gricel Medina RN  Outcome: Progressing  Flowsheets (Taken 9/21/2022 1243)  Maintains optimal cardiac output and hemodynamic stability:   Monitor blood pressure and heart rate   Monitor urine output and notify Licensed Independent Practitioner for values outside of normal range   Assess for signs of decreased cardiac output     Problem: Skin/Tissue Integrity - Adult  Goal: Skin integrity remains intact  9/21/2022 1243 by Gricel Medina RN  Outcome: Progressing  Flowsheets (Taken 9/21/2022 1243)  Skin Integrity Remains Intact: Monitor for areas of redness and/or skin breakdown     Problem: Infection - Adult  Goal: Absence of infection during hospitalization  9/21/2022 1243 by Bruce Potts RN  Outcome: Progressing  Flowsheets (Taken 9/21/2022 1243)  Absence of infection during hospitalization:   Assess and monitor for signs and symptoms of infection   Monitor lab/diagnostic results   Monitor all insertion sites i.e., indwelling lines, tubes and drains   Administer medications as ordered   Instruct and encourage patient and family to use good hand hygiene technique     Problem: Metabolic/Fluid and Electrolytes - Adult  Goal: Electrolytes maintained within normal limits  Outcome: Progressing  Flowsheets (Taken 9/21/2022 1243)  Electrolytes maintained within normal limits:   Monitor labs and assess patient for signs and symptoms of electrolyte imbalances   Monitor response to electrolyte replacements, including repeat lab results as appropriate   Administer electrolyte replacement as ordered     Problem: Metabolic/Fluid and Electrolytes - Adult  Goal: Hemodynamic stability and optimal renal function maintained  Outcome: Progressing  Flowsheets (Taken 9/21/2022 1243)  Hemodynamic stability and optimal renal function maintained:   Monitor labs and assess for signs and symptoms of volume excess or deficit   Monitor intake, output and patient weight   Monitor urine specific gravity, serum osmolarity and serum sodium as indicated or ordered   Monitor response to interventions for patient's volume status, including labs, urine output, blood pressure (other measures as available)   Encourage oral intake as appropriate     Problem: Metabolic/Fluid and Electrolytes - Adult  Goal: Glucose maintained within prescribed range  Outcome: Progressing  Flowsheets (Taken 9/21/2022 1243)  Glucose maintained within prescribed range:   Monitor blood glucose as ordered   Assess for signs and symptoms of hyperglycemia and hypoglycemia     Problem: Neurosensory - Adult  Goal: Achieves stable or improved neurological status  Outcome: Progressing  Flowsheets

## 2022-09-21 NOTE — PLAN OF CARE
Problem: Discharge Planning  Goal: Discharge to home or other facility with appropriate resources  9/20/2022 2327 by Malachi Gambino RN  Outcome: Progressing  Flowsheets (Taken 9/20/2022 2327)  Discharge to home or other facility with appropriate resources:   Identify barriers to discharge with patient and caregiver   Identify discharge learning needs (meds, wound care, etc)     Problem: Pain  Goal: Verbalizes/displays adequate comfort level or baseline comfort level  9/20/2022 2327 by Malachi Gambino RN  Outcome: Progressing  Flowsheets (Taken 9/20/2022 2327)  Verbalizes/displays adequate comfort level or baseline comfort level:   Encourage patient to monitor pain and request assistance   Assess pain using appropriate pain scale   Implement non-pharmacological measures as appropriate and evaluate response     Problem: Safety - Adult  Goal: Free from fall injury  9/20/2022 2327 by Malachi Gambino RN  Outcome: Progressing  Flowsheets (Taken 9/20/2022 2327)  Free From Fall Injury: Instruct family/caregiver on patient safety  Note: No falls this shift. Bed alarm in use this shift.      Problem: Chronic Conditions and Co-morbidities  Goal: Patient's chronic conditions and co-morbidity symptoms are monitored and maintained or improved  9/20/2022 2327 by Malachi Gambino RN  Outcome: Progressing  Flowsheets (Taken 9/20/2022 2327)  Care Plan - Patient's Chronic Conditions and Co-Morbidity Symptoms are Monitored and Maintained or Improved:   Monitor and assess patient's chronic conditions and comorbid symptoms for stability, deterioration, or improvement   Collaborate with multidisciplinary team to address chronic and comorbid conditions and prevent exacerbation or deterioration   Update acute care plan with appropriate goals if chronic or comorbid symptoms are exacerbated and prevent overall improvement and discharge     Problem: ABCDS Injury Assessment  Goal: Absence of physical injury  9/20/2022 2327 by Cornel Yoo NARGIS Mendosa  Outcome: Progressing  Flowsheets (Taken 9/20/2022 2327)  Absence of Physical Injury: Implement safety measures based on patient assessment     Problem: Cardiovascular - Adult  Goal: Maintains optimal cardiac output and hemodynamic stability  Outcome: Progressing  Flowsheets (Taken 9/20/2022 2327)  Maintains optimal cardiac output and hemodynamic stability:   Monitor blood pressure and heart rate   Monitor urine output and notify Licensed Independent Practitioner for values outside of normal range   Assess for signs of decreased cardiac output     Problem: Skin/Tissue Integrity - Adult  Goal: Skin integrity remains intact  Outcome: Progressing  Flowsheets (Taken 9/20/2022 2327)  Skin Integrity Remains Intact:   Monitor for areas of redness and/or skin breakdown   Assess vascular access sites hourly     Problem: Infection - Adult  Goal: Absence of infection during hospitalization  Outcome: Progressing  Flowsheets (Taken 9/20/2022 2327)  Absence of infection during hospitalization:   Assess and monitor for signs and symptoms of infection   Monitor lab/diagnostic results   Monitor all insertion sites i.e., indwelling lines, tubes and drains   Instruct and encourage patient and family to use good hand hygiene technique   Care plan reviewed with patient. Patient verbalizes understanding of the plan of care and contribute to goal setting.

## 2022-09-21 NOTE — PROGRESS NOTES
CT/CV Surgery Progress Note    2022 7:53 AM  Surgeon:  Dr. Sindhu Leal: Mr. Nadia Casatneda is resting comfortably in bed on RA, alert, and in no acute distress. Pt denies chest pressure, SOB, fever,chills, N/V/D.    Hgb- 9.9 continuing to increase    I/O last 3 completed shifts: In: 1818 [P.O.:1818]  Out: 2825 [Urine:2825]    Vital Signs: /64   Pulse 98   Temp 97.9 °F (36.6 °C) (Axillary)   Resp 16   Ht 6' (1.829 m)   Wt 180 lb 8 oz (81.9 kg)   SpO2 96%   BMI 24.48 kg/m²    Temp (24hrs), Av.1 °F (36.7 °C), Min:97.9 °F (36.6 °C), Max:98.3 °F (36.8 °C)    Labs:   CBC:   Recent Labs     22  03322  0314   WBC 9.4 9.4 10.1   HGB 8.9* 9.4* 9.9*   HCT 27.4* 30.0* 30.4*   MCV 90.1 91.2 89.4    286 309       BMP:   Recent Labs     22  03322  0314    136 136   K 3.8 3.8 4.0    103 102   CO2 25 23 24   BUN 10 13 15   CREATININE 0.8 0.8 0.8       Imaging:  INFERIOR VENA CAVA FILTER INSERTION /and angioplasty right common iliac vein :  Impression   1. Status post angioplasty origin right common iliac vein with excellent result. 2. Status post successful IVC filter insertion. 3. Patient will be scheduled for follow-up visit in 3 months to discuss possible IVC filter retrieval.               **This report has been created using voice recognition software. It may contain minor errors which are inherent in voice recognition technology. **       Final report electronically signed by Dr. Daniel Leach on 2022 5:20 PM       Intake/Output Summary (Last 24 hours) at 2022 8304  Last data filed at 2022 0534  Gross per 24 hour   Intake 1418 ml   Output 1725 ml   Net -307 ml         Scheduled Meds:    metoprolol tartrate  25 mg Oral BID    sodium chloride flush  5-40 mL IntraVENous 2 times per day     ROS: All neg unless specifically mentioned in subjective section.      Exam:  General Appearance: alert ,conversing, in no acute distress  Cardiovascular: normal rate, regular rhythm, normal S1 and S2, no murmurs, rubs, clicks, or gallops  Pulmonary/Chest: clear to auscultation bilaterally- no wheezes, rales or rhonchi, normal air movement, no respiratory distress  Neurological: alert, oriented, normal speech, no focal findings or movement disorder noted  Ext: DPs and PTs 2+ and equal.    Assessment:   Patient Active Problem List   Diagnosis    Essential hypertension    Tobacco abuse    History of ETOH abuse    Umbilical hernia without obstruction and without gangrene    Multiple subsegmental pulmonary emboli without acute cor pulmonale (HCC)    Acute bilateral deep vein thrombosis (DVT) of femoral veins (HCC)    Compression of vena cava    AGNES (acute kidney injury) (HCC)    Hyponatremia    Hypokalemia    Leukocytosis    Abnormal CT of the chest    Thrombocytosis    Physical deconditioning    Acute anemia    Hypocalcemia    LV dysfunction    Diastolic dysfunction    Sinus tachycardia     Plan:   IVC filter placed. Continue medical therapy. OK to resume heparin today. Repeat Hgb to monitor.     The plan of care was discussed in detail with Dr. Pam George     Electronically signed by Scott Courtney PA-C on 9/21/2022 at 7:53 AM

## 2022-09-22 VITALS
TEMPERATURE: 97.9 F | OXYGEN SATURATION: 94 % | RESPIRATION RATE: 18 BRPM | SYSTOLIC BLOOD PRESSURE: 121 MMHG | WEIGHT: 176.9 LBS | DIASTOLIC BLOOD PRESSURE: 78 MMHG | HEART RATE: 94 BPM | BODY MASS INDEX: 23.96 KG/M2 | HEIGHT: 72 IN

## 2022-09-22 LAB
ANION GAP SERPL CALCULATED.3IONS-SCNC: 11 MEQ/L (ref 8–16)
BUN BLDV-MCNC: 18 MG/DL (ref 7–22)
CALCIUM SERPL-MCNC: 8.7 MG/DL (ref 8.5–10.5)
CHLORIDE BLD-SCNC: 102 MEQ/L (ref 98–111)
CO2: 24 MEQ/L (ref 23–33)
CREAT SERPL-MCNC: 0.8 MG/DL (ref 0.4–1.2)
GFR SERPL CREATININE-BSD FRML MDRD: > 90 ML/MIN/1.73M2
GLUCOSE BLD-MCNC: 110 MG/DL (ref 70–108)
HCT VFR BLD CALC: 31.1 % (ref 42–52)
HCT VFR BLD CALC: 31.8 % (ref 42–52)
HEMOGLOBIN: 10.2 GM/DL (ref 14–18)
HEMOGLOBIN: 9.6 GM/DL (ref 14–18)
HEPARIN UNFRACTIONATED: 0.18 U/ML (ref 0.3–0.7)
HEPARIN UNFRACTIONATED: 0.44 U/ML (ref 0.3–0.7)
POTASSIUM REFLEX MAGNESIUM: 4.3 MEQ/L (ref 3.5–5.2)
SODIUM BLD-SCNC: 137 MEQ/L (ref 135–145)

## 2022-09-22 PROCEDURE — 85520 HEPARIN ASSAY: CPT

## 2022-09-22 PROCEDURE — 36415 COLL VENOUS BLD VENIPUNCTURE: CPT

## 2022-09-22 PROCEDURE — 99238 HOSP IP/OBS DSCHRG MGMT 30/<: CPT | Performed by: FAMILY MEDICINE

## 2022-09-22 PROCEDURE — 94760 N-INVAS EAR/PLS OXIMETRY 1: CPT

## 2022-09-22 PROCEDURE — 2580000003 HC RX 258: Performed by: STUDENT IN AN ORGANIZED HEALTH CARE EDUCATION/TRAINING PROGRAM

## 2022-09-22 PROCEDURE — 80048 BASIC METABOLIC PNL TOTAL CA: CPT

## 2022-09-22 PROCEDURE — APPSS30 APP SPLIT SHARED TIME 16-30 MINUTES: Performed by: PHYSICIAN ASSISTANT

## 2022-09-22 PROCEDURE — 97530 THERAPEUTIC ACTIVITIES: CPT

## 2022-09-22 PROCEDURE — 6370000000 HC RX 637 (ALT 250 FOR IP)

## 2022-09-22 PROCEDURE — 6360000002 HC RX W HCPCS: Performed by: STUDENT IN AN ORGANIZED HEALTH CARE EDUCATION/TRAINING PROGRAM

## 2022-09-22 PROCEDURE — 97116 GAIT TRAINING THERAPY: CPT

## 2022-09-22 PROCEDURE — 85014 HEMATOCRIT: CPT

## 2022-09-22 PROCEDURE — 6370000000 HC RX 637 (ALT 250 FOR IP): Performed by: FAMILY MEDICINE

## 2022-09-22 PROCEDURE — 85018 HEMOGLOBIN: CPT

## 2022-09-22 RX ORDER — DOXEPIN HYDROCHLORIDE 10 MG/1
10 CAPSULE ORAL NIGHTLY
Qty: 30 CAPSULE | Refills: 0 | Status: SHIPPED | OUTPATIENT
Start: 2022-09-22 | End: 2022-10-22

## 2022-09-22 RX ADMIN — HEPARIN SODIUM 26 UNITS/KG/HR: 10000 INJECTION, SOLUTION INTRAVENOUS at 08:20

## 2022-09-22 RX ADMIN — SODIUM CHLORIDE, PRESERVATIVE FREE 10 ML: 5 INJECTION INTRAVENOUS at 08:00

## 2022-09-22 RX ADMIN — HEPARIN SODIUM 3280 UNITS: 1000 INJECTION, SOLUTION INTRAVENOUS; SUBCUTANEOUS at 11:58

## 2022-09-22 RX ADMIN — HEPARIN SODIUM 6550 UNITS: 1000 INJECTION, SOLUTION INTRAVENOUS; SUBCUTANEOUS at 00:28

## 2022-09-22 RX ADMIN — METOPROLOL TARTRATE 25 MG: 25 TABLET, FILM COATED ORAL at 08:00

## 2022-09-22 RX ADMIN — APIXABAN 10 MG: 5 TABLET, FILM COATED ORAL at 13:25

## 2022-09-22 RX ADMIN — HEPARIN SODIUM 26 UNITS/KG/HR: 10000 INJECTION, SOLUTION INTRAVENOUS at 00:31

## 2022-09-22 ASSESSMENT — PAIN SCALES - GENERAL: PAINLEVEL_OUTOF10: 0

## 2022-09-22 NOTE — PLAN OF CARE
Problem: Discharge Planning  Goal: Discharge to home or other facility with appropriate resources  9/22/2022 1028 by Loren Nelson RN  Outcome: Progressing  Flowsheets (Taken 9/22/2022 0800)  Discharge to home or other facility with appropriate resources:   Identify barriers to discharge with patient and caregiver   Arrange for needed discharge resources and transportation as appropriate   Identify discharge learning needs (meds, wound care, etc)   Refer to discharge planning if patient needs post-hospital services based on physician order or complex needs related to functional status, cognitive ability or social support system     Problem: Pain  Goal: Verbalizes/displays adequate comfort level or baseline comfort level  9/22/2022 1028 by Loren Nelson RN  Outcome: Progressing  Flowsheets (Taken 9/22/2022 0800)  Verbalizes/displays adequate comfort level or baseline comfort level:   Encourage patient to monitor pain and request assistance   Assess pain using appropriate pain scale   Administer analgesics based on type and severity of pain and evaluate response   Implement non-pharmacological measures as appropriate and evaluate response     Problem: Safety - Adult  Goal: Free from fall injury  9/22/2022 1028 by Loren Nelson RN  Outcome: Progressing  Flowsheets (Taken 9/22/2022 1028)  Free From Fall Injury: Instruct family/caregiver on patient safety     Problem: Chronic Conditions and Co-morbidities  Goal: Patient's chronic conditions and co-morbidity symptoms are monitored and maintained or improved  9/22/2022 1028 by Loren Nelson RN  Outcome: Progressing  Flowsheets (Taken 9/22/2022 0800)  Care Plan - Patient's Chronic Conditions and Co-Morbidity Symptoms are Monitored and Maintained or Improved:   Monitor and assess patient's chronic conditions and comorbid symptoms for stability, deterioration, or improvement   Collaborate with multidisciplinary team to address chronic and Joey Brewster RN  Outcome: Progressing  Flowsheets (Taken 9/22/2022 0800)  Electrolytes maintained within normal limits: Monitor labs and assess patient for signs and symptoms of electrolyte imbalances     Problem: Metabolic/Fluid and Electrolytes - Adult  Goal: Hemodynamic stability and optimal renal function maintained  Outcome: Progressing  Flowsheets (Taken 9/22/2022 0800)  Hemodynamic stability and optimal renal function maintained:   Monitor labs and assess for signs and symptoms of volume excess or deficit   Monitor intake, output and patient weight   Encourage oral intake as appropriate     Problem: Metabolic/Fluid and Electrolytes - Adult  Goal: Glucose maintained within prescribed range  Outcome: Progressing  Flowsheets (Taken 9/22/2022 0800)  Glucose maintained within prescribed range: Monitor blood glucose as ordered     Problem: Neurosensory - Adult  Goal: Achieves stable or improved neurological status  9/22/2022 1028 by Joey Brewster RN  Outcome: Progressing  Flowsheets (Taken 9/22/2022 0800)  Achieves stable or improved neurological status: Assess for and report changes in neurological status     Problem: Neurosensory - Adult  Goal: Achieves maximal functionality and self care  Outcome: Progressing  Flowsheets (Taken 9/22/2022 0800)  Achieves maximal functionality and self care:   Monitor swallowing and airway patency with patient fatigue and changes in neurological status   Encourage and assist patient to increase activity and self care with guidance from physical therapy/occupational therapy     Problem: Respiratory - Adult  Goal: Achieves optimal ventilation and oxygenation  9/22/2022 1028 by Joey Brewster RN  Outcome: Progressing  Flowsheets (Taken 9/22/2022 0800)  Achieves optimal ventilation and oxygenation:   Assess for changes in respiratory status   Assess for changes in mentation and behavior   Assess and instruct to report shortness of breath or any respiratory difficulty     Care plan reviewed with patient and family. Patient and family verbalize understanding of the plan of care and contribute to goal setting.

## 2022-09-22 NOTE — CARE COORDINATION
Collaborative Discharge Planning    Brianna Long  :  1960  MRN:  [id number]    ADMIT DATE:  9/15/2022      Discharge Planning Discharge Planning  Type of Residence: Long-Term Acute Care, Skilled Nursing Facility  Living Arrangements: Spouse/Significant Other  Support Systems: Spouse/Significant Other  Current Services Prior To Admission: None  Potential Assistance Needed: N/A  DME Ordered?: No  Potential Assistance Purchasing Medications: No  Type of Home Care Services: None  Patient expects to be discharged to[de-identified] Skilled nursing facility  Follow Up Appointment: Best Day/Time : Thursday AM  One/Two Story Residence: One story  History of falls?: No  White Board Notes /Social Work Whiteboard Notes  /Social Work Whiteboard: ; SW - New [facility]. Home with spouse.     Discharge Plan Home with home health  from [facility]; [facility] therapy following; ambulates 280 feet w therapy    Discharge Milestones and Delays: Clinical status    B/L Pulmonary Emboli/BLE DVT  History: Alcohol Use/Smoker, AAA Repair 2 weeks ago [facility])  9/15 IVC/LLE Thromboaspiration/Thrombectomy   Left IVC Stent Placement (compressed IVC)   IVC w Angioplasty  Heparin gtt continued; H 9.6; monitor H    Await Eliquis start if H stable per notes      SIGNED:  Mary Garcia RN   2022, 11:30 AM

## 2022-09-22 NOTE — PLAN OF CARE
Problem: Discharge Planning  Goal: Discharge to home or other facility with appropriate resources  9/21/2022 2240 by Kandi Najjar, RN  Outcome: Progressing  Flowsheets (Taken 9/21/2022 2240)  Discharge to home or other facility with appropriate resources:   Identify barriers to discharge with patient and caregiver   Identify discharge learning needs (meds, wound care, etc)     Problem: Pain  Goal: Verbalizes/displays adequate comfort level or baseline comfort level  9/21/2022 2240 by Kandi Najjar, RN  Outcome: Progressing  Flowsheets (Taken 9/21/2022 2240)  Verbalizes/displays adequate comfort level or baseline comfort level:   Encourage patient to monitor pain and request assistance   Assess pain using appropriate pain scale   Implement non-pharmacological measures as appropriate and evaluate response     Problem: Safety - Adult  Goal: Free from fall injury  9/21/2022 2240 by Kandi Najjar, RN  Outcome: Progressing  Flowsheets (Taken 9/21/2022 2240)  Free From Fall Injury: Instruct family/caregiver on patient safety  Note: Patient free from falls this shift.       Problem: Chronic Conditions and Co-morbidities  Goal: Patient's chronic conditions and co-morbidity symptoms are monitored and maintained or improved  9/21/2022 2240 by Kandi Najjar, RN  Outcome: Progressing  Flowsheets (Taken 9/21/2022 2240)  Care Plan - Patient's Chronic Conditions and Co-Morbidity Symptoms are Monitored and Maintained or Improved:   Monitor and assess patient's chronic conditions and comorbid symptoms for stability, deterioration, or improvement   Collaborate with multidisciplinary team to address chronic and comorbid conditions and prevent exacerbation or deterioration   Update acute care plan with appropriate goals if chronic or comorbid symptoms are exacerbated and prevent overall improvement and discharge     Problem: ABCDS Injury Assessment  Goal: Absence of physical injury  9/21/2022 2240 by Kandi Najjar, RN  Outcome: Progressing  Flowsheets (Taken 9/21/2022 2240)  Absence of Physical Injury: Implement safety measures based on patient assessment  Note: Bed alarm in use this shift.      Problem: Nutrition Deficit:  Goal: Optimize nutritional status  9/21/2022 2240 by Lyn Richards RN  Outcome: Progressing  Flowsheets (Taken 9/21/2022 2240)  Nutrient intake appropriate for improving, restoring, or maintaining nutritional needs:   Assess nutritional status and recommend course of action   Monitor oral intake, labs, and treatment plans     Problem: Cardiovascular - Adult  Goal: Maintains optimal cardiac output and hemodynamic stability  9/21/2022 2240 by Lyn Richards RN  Outcome: Progressing  Flowsheets (Taken 9/21/2022 2240)  Maintains optimal cardiac output and hemodynamic stability:   Monitor blood pressure and heart rate   Monitor urine output and notify Licensed Independent Practitioner for values outside of normal range   Assess for signs of decreased cardiac output     Problem: Skin/Tissue Integrity - Adult  Goal: Skin integrity remains intact  9/21/2022 2240 by Lyn Richards RN  Outcome: Progressing  Flowsheets (Taken 9/21/2022 2240)  Skin Integrity Remains Intact:   Monitor for areas of redness and/or skin breakdown   Assess vascular access sites hourly     Problem: Infection - Adult  Goal: Absence of infection during hospitalization  9/21/2022 2240 by Lyn Richards RN  Outcome: Progressing  Flowsheets (Taken 9/21/2022 2240)  Absence of infection during hospitalization:   Assess and monitor for signs and symptoms of infection   Monitor lab/diagnostic results   Monitor all insertion sites i.e., indwelling lines, tubes and drains   Instruct and encourage patient and family to use good hand hygiene technique     Problem: Metabolic/Fluid and Electrolytes - Adult  Goal: Electrolytes maintained within normal limits  9/21/2022 2240 by Lyn Richards RN  Outcome: Progressing  Flowsheets (Taken 9/21/2022 2240)  Electrolytes maintained within normal limits: Monitor labs and assess patient for signs and symptoms of electrolyte imbalances     Problem: Neurosensory - Adult  Goal: Achieves stable or improved neurological status  9/21/2022 2240 by Joseline Justice RN  Outcome: Progressing  Flowsheets (Taken 9/21/2022 2240)  Achieves stable or improved neurological status: Assess for and report changes in neurological status     Problem: Respiratory - Adult  Goal: Achieves optimal ventilation and oxygenation  9/21/2022 2240 by Joseline Justice RN  Outcome: Progressing  Flowsheets (Taken 9/21/2022 2240)  Achieves optimal ventilation and oxygenation:   Assess for changes in respiratory status   Assess for changes in mentation and behavior   Position to facilitate oxygenation and minimize respiratory effort   Encourage broncho-pulmonary hygiene including cough, deep breathe, incentive spirometry   Assess and instruct to report shortness of breath or any respiratory difficulty   Care plan reviewed with patient. Patient verbalizes understanding of the plan of care and contribute to goal setting.

## 2022-09-22 NOTE — DISCHARGE SUMMARY
DISCHARGE SUMMARY      Patient Identification:   Rahel Foster   : 1960  MRN: [id number]   Account: [de-identified]      Patient's PCP: WENDI Polo    Admit Date: 9/15/2022     Discharge Date: 2022     Admitting Physician: Nancy Tafoya MD     Discharge Physician: Jose Baker DO     Discharge Assessment/Plan:     Bilateral Multilobular Pulmonary Embolism and Bilateral Proximal DVTs: Confirmed on CTA chest 9/15/22 and dopplers of BLE 9/15/22 --> PE's  noted to be heavy burden w/in segmental and subsegmental of all 5 lobes but no right heart strain. BP has remained stable, however Hgb dropped to 7.2 over 2 days from 12.8 at admission 9/15/22. Bilateral DVTs are proximal with left > right. Was hospitalized in [place] for 2 weeks for AAA repair ( - )  - Patient placed on heparin gtt and underwent LLE thrombectomy by Dr. Ye Saleh 9/15 and then IVC stent placement  with additional thromboaspiration then  IVC filter placement  - Echo 22 w/o RV strain with extensive PEs, EF 40% with mod global hypokinesis, grade 1 DD  - Heparin gtt started on admission -> stopped  per CTS for concern of bleeding with drop in h/h, pt received PRBC x2  - S/p IVC filter placement  -> CTS recs to resume heparin   - Encourage IS, wean O2 as able --> on 2L NC as of 2022   -  resume heparin gtt per CTS recommendations. Potentially transition to eliquis  -  if H&H remains stable  - CBC and BMP daily   -  IPR denied. Strength improving - PT/OT recommending home with home health PT/OT  - Discharged with home health in stable condition on RA     Acute likely blood loss Anemia --- worsening  --  Drop in Hgb from 12.8 on 9/15 to 10.5 on  to 7.4-7.2 on .  CTA abd per CTS with findings have not identified an acute bleed  nor , however the patient has bilateral retroperitoneal hematomas (POA)  -- ?due to heparin and cont bleeding vs ??loss due to recurrent thromboaspiration of BLE and IVC clots  - Consulted Thoracic surgery --> per Dr. Pam George pt Transfused 2 units PRBC 9/17 and hgb up to 9.1  - 9/20 Hgb improved to 9.4  - Daily CBC  - Lopressor 25 mg BID for BP control  - ?need to r/o GI etiology, will consider FOBT if Hgb drops again  - Transfuse <7  - no signs of hemolysis 9/20  - Per CTS, okay to resume heparin gtt 9/21. Plan transition to eliquis on 9/22  - Heparin transitioned to loading dose of eliquis in AM 9/22  - Discharged with loading dose supply in hand for eliquis     LV dysfunction with diastolic dysfunction --  Given extensive clot history, this may be an acute development, however chronic cardiac dysfunction cannot be excluded entirely. ECHO from 9/16/ss with moderatedly reduced systolic function and EF of 40% as well as grade 1 diastolic dysfunction.  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 Status post successful thromboaspiration procedure with significant improvement and s/p agnioplasty of IVC and s/p stenting of IVC restoring good flow lumen caliber to IVC  - 9/18 Per IR, inferior vena cava filter insertion , and angioplasty right common iliac vein, successful with minimal EBL     AGNES, Pre-Renal etiology, resolved: up to 2.1 on admission 9/15 --> FeNa 0.3% 9/15. Recently hospitalized at [facility], s/p AAA rupture repair, ?heavy contrast exposure? --> noted per CTA abd 9/17 of focal infarcts in right kidney -- Cr baseline unknown.   - IV NS stopped 9/19 evening, continue encouraging PO hydration  - 9/17 got contrast CTA, monitor Cr for rise in few days  - 9/22 Cr stable at 0.8 again  - Held ACE inhibitor at DC  - Lopressor 25 mg BID for BP control     Leukocytosis, resolved: afeb - CT chest on admission with small amount of tree-in-bud nodularity along the left lung base suspicious for aspiration pneumonia vs PE inflammation. Afebrile. No history of recent cough.  No diarrhea and no acute findings of infxn on CT abd  - Procalcitonin elevation likely due to decreased renal clearance but ??due to pneumonia   - Empiric IV Rocephin started on admission 9/15, completed 9/19  - 9/22 WBC WNL at 9.8 stable  - u/a 9/15 (-), no BC done on admission  - Daily CBC     Possible pneumonia LLL  -- CTA chest on admission 9/15/22 = Small amount of tree-in-bud nodularity along the left lung base suggestive of an infectious or inflammatory process including aspiration pneumonitis   - PCT elevated 0.54 and WBC up to 24.5 on admission 9/15 thus will treat as infxn with recent extensive admission at [facility]  - Completed IV Rocephin x 5days from 9/16--> 9/19  - 9/22 WBC WNL at 9.8 stable  - 9/22 denies cough or SOB  - Stable on RA for DC 9/22     Combined Metabolic Alkalosis with Respiratory Alkalosis, improved: Due to AGNES and tachypnea from PE - improving and WNL on 9/22 - monitor     Mild Asymptomatic Hyponatremia, resolved: Etiology likely AGNES and per [facility] records pt was d/c with HCTZ?? -- holding any diuretics and improved with IVF to 136 on 9/19 from 129 on admission 9/15 -- stopped IVF 9/19 evening.              - 9/20 Na WNL at 136  - Daily BMP     Mild Asymptomatic Hyperkalemia 2/2 ACE-I Therapy, resolved: K 5.6 on arrival.   - Held ACE inhibitor for DC  - Stable EKG 9/19  - 9/22 K stable at 4.3  - Daily BMP     Sinus tachycardia -- due to above -monitor tele - ?BB as was on lopressor at d/c per d/c summary at [facility] -- 9/19 EKG with NSR, HR at 98--- continue with Lopressor 25 mg BID     Thrombocytosis -- likely reactive -- platelets stable 2/82 at 318, transitioned to eliquis for DC     Recent AAA rupture s/p Patient recently was discharged from [place] where he was there for 2 weeks for a AAA repair. Patient was discharged to [facility] for further rehab. Reports that he arrived there on the day prior to admission around 2 PM.  On the morning of admission patient states that he tried to walk around and noticed that after just a few feet he would get short of breath. Later in the morning he noticed that his upper left leg started getting sore. He subsequently started developing hyperventilation approximately 1 hour after. EMS was called and patient was brought to the ED for further evaluation. Patient states that he has never had a blood clot before in the past.    Per prior progress notes  \"Summarized ED workup and finding  -- On arrival patient was found to be afebrile, tachypnea, tachycardic and hypertensive. He was placed on 3 L of O2 via NC.  -- Labs revealed a mild hyponatremia, mild hyperkalemia, prerenal AGNES, reactive leukocytosis, reactive thrombocythemia  -- EKG revealed sinus tachycardia  -- CTA chest revealed heavy burden of pulmonary emboli within the segmental and subsegmental arteries of all 5 lobes. VL venous duplex ultrasound of the lower extremities revealed a large left DVT with extension from the veins in the calf to the left common femoral vein. There is also a DVT in the right common femoral, proximal superficial femoral and peroneal veins  --Placed on heparin gtt. and immediately transferred to IR where Dr. Mera Vivas performed a thrombectomy of the LLE as well as angioplasty of the IVC  -- Dr. Mera Vivas spoke to this resident over telephone; patient has what appears to be a compressed IVC which was unable to be opened up via angioplasty.   Recommends stenting tomorrow morning     Pertinent/Relevant History  -- Patient was recently discharged from [facility] for a AAA repair  -- He was then transferred to [facility] for rehab where on the day before admission he had the agreeable. Further risks of procedure to be discussed with pt by Dr. Fabienne Molina. \" Per Op note \" inferior vena cava filter insertion , and angioplasty right common iliac vein, successful, minimal blood loss, no immediate complications\"     5/81 - S/p IVC filter placement 9/18. He received 2 units of blood 9/17 and his Hbg remains stable today at 8.9. We are holding anticoagulation therapy at this time due to concern for bleeding. Daily BMP and CBC in AM. Consulted PM&R as PT/OT recommend inpatient rehab. Dietary recommends supplemental ensure due to moderate malnourishment. Pt set to complete final day of Rocephin today. CTS and PM&R following. Will stop continuous NS if condition remains stable and PO intake is good given moderate malnutrition. 9/20 - CTS recommends that we can resume heparin therapy 9/21 from their standpoint, s/p IVC filter 9/18. The patient's Hgb is improving, now at 9.4, s/p PRBC transfusion x2 9/17 and IVF was stopped 9/19 evening. Case management and social work are working on pre-certification for inpatient rehab per PM&R and PT/OT recommendations. Rocephin x 5 days completed 9/19, WBC WNL at 9.4 again 9/20. Started 25 mg Lopressor BID, BP stable and 129/82 as of noon 9/20. Daily CBC and BMP, and if stable tomorrow will restart anticoagulation. 9/21 - heparin resumed and trend H&H. If stable, will transition to eliquis on 9/22. Doing well today. 9/22 - Heparin drip transitioned to loading dosage of Eliquis 10 mg BID, supply given to patient at discharge. Hgb stable at 10.2, spO2 stable on RA. Discharge today to home with home health.       Exam:     Vitals:  Vitals:    09/22/22 0323 09/22/22 0412 09/22/22 0800 09/22/22 1119   BP: 127/80  135/85 121/78   Pulse: (!) 101 99 100 94   Resp: 16 16 14 18   Temp: 98.4 °F (36.9 °C)  97.8 °F (36.6 °C) 97.9 °F (36.6 °C)   TempSrc: Oral  Oral Oral   SpO2: 92% 92% 95% 94%   Weight: 176 lb 14.4 oz (80.2 kg)      Height:         Weight: Weight: 176 lb 14.4 oz (80.2 kg)     24 hour intake/output:  Intake/Output Summary (Last 24 hours) at 9/22/2022 1636  Last data filed at 9/22/2022 1301  Gross per 24 hour   Intake 691.12 ml   Output 1700 ml   Net -1008.88 ml     Physical Exam  Vitals and nursing note reviewed. Constitutional:       General: He is not in acute distress. Appearance: Normal appearance. He is not ill-appearing, toxic-appearing or diaphoretic. HENT:      Head: Normocephalic and atraumatic. Nose: Nose normal. No rhinorrhea. Mouth/Throat:      Mouth: Mucous membranes are moist.      Pharynx: Oropharynx is clear. Eyes:      General: No scleral icterus. Extraocular Movements: Extraocular movements intact. Conjunctiva/sclera: Conjunctivae normal.   Cardiovascular:      Rate and Rhythm: Normal rate and regular rhythm. Pulses: Normal pulses. Heart sounds: Normal heart sounds. No murmur heard. No friction rub. No gallop. Pulmonary:      Effort: Pulmonary effort is normal. No respiratory distress. Breath sounds: Normal breath sounds. No wheezing, rhonchi or rales. Abdominal:      General: Abdomen is flat. Bowel sounds are normal. There is no distension. Palpations: Abdomen is soft. There is no mass. Tenderness: There is no abdominal tenderness. Musculoskeletal:         General: No swelling, tenderness, deformity or signs of injury. Normal range of motion. Cervical back: Normal range of motion. No rigidity or tenderness. Skin:     General: Skin is warm and dry. Capillary Refill: Capillary refill takes less than 2 seconds. Coloration: Skin is not jaundiced or pale. Findings: No bruising or rash. Neurological:      General: No focal deficit present. Mental Status: He is alert and oriented to person, place, and time. Motor: Weakness (improved) present.       Coordination: Coordination normal.      Gait: Gait normal.      Comments: Weakness overall improved,  strength 5/5, ambulation much better  Psychiatric:         Mood and Affect: Mood normal.         Behavior: Behavior normal.         Thought Content: Thought content normal.         Judgment: Judgment normal.       Labs: For convenience and continuity at follow-up the following most recent labs are provided:      CBC:    Lab Results   Component Value Date/Time    WBC 9.8 09/21/2022 09:12 AM    HGB 10.2 09/22/2022 10:50 AM    HCT 31.1 09/22/2022 10:50 AM     09/21/2022 09:12 AM       Renal:    Lab Results   Component Value Date/Time     09/22/2022 04:18 AM    K 4.3 09/22/2022 04:18 AM     09/22/2022 04:18 AM    CO2 24 09/22/2022 04:18 AM    BUN 18 09/22/2022 04:18 AM    CREATININE 0.8 09/22/2022 04:18 AM    CALCIUM 8.7 09/22/2022 04:18 AM         Significant Diagnostic Studies    Radiology:   IR GUIDED IVC FILTER PLACEMENT   Final Result   1. Status post angioplasty origin right common iliac vein with excellent result. 2. Status post successful IVC filter insertion. 3. Patient will be scheduled for follow-up visit in 3 months to discuss possible IVC filter retrieval.            **This report has been created using voice recognition software. It may contain minor errors which are inherent in voice recognition technology. **      Final report electronically signed by Dr. Puja Henson on 9/18/2022 5:20 PM      CTA ABDOMEN PELVIS W WO CONTRAST   Final Result   1. Postop changes in the abdomen related to aortobifemoral bypass graft insertion. Extensive intra-abdominal soft tissue stranding and chronic hematoma at the operative site and tracking into the pelvis along the psoas muscles bilaterally, worse on the    right. 2. Focal infarcts in the right kidney, unchanged from yesterday. 3. Findings of impending diarrhea. Stent in the inferior vena cava. Hydropic gallbladder. **This report has been created using voice recognition software.   It may contain minor errors which are inherent in voice recognition technology. **      Final report electronically signed by Dr. Hardeep Ponce on 9/17/2022 3:14 PM      IR TRANSCATH PLACE INTRAVASC STENT OPEN/PERC W OR WO ANGIO EACH ADDL VEIN   Final Result   1. Extensive venous thrombosis both common iliac veins with extension into the inferior vena cava. Status post successful thromboaspiration procedure with significant improvement. .   2. Status post angioplasty of the inferior vena cava with suboptimal result. Status post successful stenting of the inferior vena cava restoring good flow lumen caliber to the IVC. **This report has been created using voice recognition software. It may contain minor errors which are inherent in voice recognition technology. **      Final report electronically signed by Dr. Hardeep Ponce on 9/16/2022 5:21 PM      CT ABDOMEN PELVIS WO CONTRAST Additional Contrast? None   Final Result   1. There is appearance of the aortobifemoral graft placement. 2. Large right retroperitoneal hematoma. Small left retroperitoneal hematoma. These are felt to relate to prior abdominal aortic aneurysm. 3. The inferior vena cava is compressed distally by the large right retroperitoneal hematoma. **This report has been created using voice recognition software. It may contain minor errors which are inherent in voice recognition technology. **      Final report electronically signed by Dr Dayana Reis on 9/16/2022 10:29 AM      IR GUIDED [facility] VEIN   Final Result   1. Extensive venous thrombosis. 2. Status post successful declot/thrombectomy procedure, as outlined above. 3. Marked narrowing of the inferior vena cava, appears related to external compression. Angioplasty of the inferior vena cava and left innominate vein was performed, unfortunately, with no significant improvement. **This report has been created using voice recognition software.   It may contain minor errors which are inherent in voice recognition technology. **         Final report electronically signed by Dr. Chay Calhoun on 9/16/2022 11:31 AM      VL DUP LOWER EXTREMITY VENOUS BILATERAL   Final Result   1. Large acute deep venous thrombosis extending from the left common    femoral vein through the visualized veins of the calf. 2. Acute deep venous thrombosis present within the right common femoral,    proximal superficial femoral and peroneal veins. This document has been electronically signed by: Wang Curran MD on    09/15/2022 08:28 PM      Technique Used: Duplex examination performed utilizing grayscale, color    and spectral analysis. CTA Chest W WO Contrast   Final Result   1. Heavy burden pulmonary emboli within the segmental and subsegmental    arteries of all 5 lobes. No evidence of right heart strain. 2. Small amount of tree-in-bud nodularity along the left lung base    suggestive of an infectious or inflammatory process including aspiration    pneumonitis. This document has been electronically signed by: Wang Curran MD on    09/15/2022 07:07 PM      All CTs at this facility use dose modulation techniques and iterative    reconstructions, and/or weight-based dosing   when appropriate to reduce radiation to a low as reasonably achievable. 3D Post-processing was performed on this study. XR CHEST PORTABLE   Final Result   1. No acute cardiopulmonary abnormality. This document has been electronically signed by:  Wang Curran MD on    09/15/2022 06:33 PM      IR  [facility] [address]    (Results Pending)   VL DUP LOWER EXTREMITY VENOUS BILATERAL    (Results Pending)          Consults:     IP CONSULT TO SOCIAL WORK  IP CONSULT TO VASCULAR SURGERY  IP CONSULT TO PHYSICAL MEDICINE REHAB  IP CONSULT TO REHAB/TCU ADMISSION COORDINATOR  IP CONSULT TO HOME CARE NEEDS    Disposition:    [x] Home with [facility]       [] TCU       [] Rehab       [] Psych       [] SNF       [] [facility]       [] Other-    Condition at Discharge: Stable    Code Status:  Full Code    Patient Instructions:    Discharge lab work: n/a  Activity: activity as tolerated per home health  Diet: ADULT DIET; Regular  ADULT ORAL NUTRITION SUPPLEMENT; Breakfast, Lunch, Dinner; Standard 4 oz Oral Supplement      Follow-up visits:   [address] [id number] [phone]        Almas Rodriguez, [address] [facility]  [phone]    Follow up on 10/6/2022  your appointment time is at 9:00a, Please arrive 15mins early, Bring insurance card & Photo ID, co-pay, medication bottles & completed forms. Discharge Medications:        Medication List        START taking these medications      * apixaban 5 MG Tabs tablet  Commonly known as: ELIQUIS  Take 2 tablets by mouth 2 times daily for 14 doses     * apixaban 5 MG Tabs tablet  Commonly known as: Eliquis  Take 1 tablet by mouth 2 times daily START 1 Tablet twice a day 9/2/2022  Start taking on: September 29, 2022     doxepin 10 MG capsule  Commonly known as: SINEQUAN  Take 1 capsule by mouth nightly     metoprolol tartrate 25 MG tablet  Commonly known as: LOPRESSOR  Take 1 tablet by mouth 2 times daily           * This list has 2 medication(s) that are the same as other medications prescribed for you. Read the directions carefully, and ask your doctor or other care provider to review them with you.                 CONTINUE taking these medications      docusate sodium 100 MG capsule  Commonly known as: Colace  Take 1 capsule by mouth 2 times daily as needed for Constipation            STOP taking these medications      lisinopril 20 MG tablet  Commonly known as: PRINIVIL;ZESTRIL               Where to Get Your Medications        These medications were sent to [address], [address]  [address], [facility] [place] [address]      Phone: [phone]   apixaban 5 MG Tabs tablet       These medications were sent to [address] [place] [address]  [address], [address] [facility]      Phone: [phone]   apixaban 5 MG Tabs tablet  doxepin 10 MG capsule  metoprolol tartrate 25 MG tablet         Time Spent on discharge is more than 20 minutes in the examination, evaluation, counseling and review of medications and discharge plan. Signed: Thank you WENDI Slade for the opportunity to be involved in this patient's care.     Electronically signed by Ivette Wang DO on 9/22/2022 at 4:36 PM

## 2022-09-22 NOTE — DISCHARGE INSTR - DIET
Good nutrition is important when healing from an illness, injury, or surgery. Follow any nutrition recommendations given to you during your hospital stay. If you were given an oral nutrition supplement while in the hospital, continue to take this supplement at home. You can take it with meals, in-between meals, and/or before bedtime. These supplements can be purchased at most local grocery stores, pharmacies, and chain super-stores. If you have any questions about your diet or nutrition, call the hospital and ask for the dietitian. Healthy Eating habits help your heart health. Below are general guidlelines for heart healthy choices:    Eat fruits and vegetables. They are loaded with vitamins, minerals and fiber. Eat a variety every day. .   Eat a variety of whole grain products every day. They contain a lot of fiber and nutrients. Examples of whole grains include oats, whole wheat bread, and brown rice. Eat fish at least 2 times each week. Oily fish, which contain omega-3 fatty acids, are best for your heart. These fish include tuna, salmon, mackerel, lake trout, herring, and sardines. Limit saturated fat and cholesterol. To limit saturated fats and cholesterol, try to choose the following foods:   Lean meats and meat alternatives (chicken, fish, turkey, beans, and nuts)  Nonfat and low-fat dairy products (skim or 1% milk, low fat yogurt)  Unsaturated fats, like canola and olive oils instead of saturated fats, such as butter  Read food labels and limit the amount of trans fat you eat. Trans fat raises cholesterol. It is found in many processed foods made with shortening or with partially hydrogenated vegetable oils. These foods include cookies, crackers, chips, and many snack foods. Choose snacks with \"0\" grams of Trans fat. Choose healthy fats. Unsaturated fats, such as olive, canola and peanut oils, and nuts are part of a healthy diet.  But all fats are high in calories, so limit your serving sizes. Limit salt/sodium. Choose and prepare foods with little or no salt. Use pepper or Mrs. Dash for added flavor. Limit high sodium foods like canned soups, processed meats and cheeses, and salty snack foods. Limit beverages and food with added sugars (such as regular pop, sweetened iced tea, desserts and sweets). These foods are loaded with calories but provide very little nutrients. If you drink alcohol, drink in moderation. Limit alcohol intake to 2 drinks a day for men and 1 drink a day for women.  Check with your Doctor first.

## 2022-09-22 NOTE — PROGRESS NOTES
Units/kg IntraVENous Once    metoprolol tartrate  25 mg Oral BID    sodium chloride flush  5-40 mL IntraVENous 2 times per day     ROS: All neg unless specifically mentioned in subjective section. Exam:  General Appearance: alert ,conversing, in no acute distress  Cardiovascular: normal rate, regular rhythm, normal S1 and S2, no murmurs, rubs, clicks, or gallops  Pulmonary/Chest: clear to auscultation bilaterally- no wheezes, rales or rhonchi, normal air movement, no respiratory distress  Neurological: alert, oriented, normal speech, no focal findings or movement disorder noted  Ext: DPs and PTs 2+ and equal.    Assessment:   Patient Active Problem List   Diagnosis    Essential hypertension    Tobacco abuse    History of ETOH abuse    Umbilical hernia without obstruction and without gangrene    Multiple subsegmental pulmonary emboli without acute cor pulmonale (HCC)    Acute bilateral deep vein thrombosis (DVT) of femoral veins (HCC)    Compression of vena cava    AGNES (acute kidney injury) (HCC)    Hyponatremia    Hypokalemia    Leukocytosis    Abnormal CT of the chest    Thrombocytosis    Physical deconditioning    Acute anemia    Hypocalcemia    LV dysfunction    Diastolic dysfunction    Sinus tachycardia     Assesment/Plan:   IVC filter placed. Continue medical therapy. Started heparin yesterday. Continue to monitor Hgb. Continue current care.     The plan of care was discussed in detail with Dr. Jayda Shen     Electronically signed by WENDI Castrejon on 9/22/2022 at 10:48 AM

## 2022-09-22 NOTE — PROGRESS NOTES
[address] [place] [address]  INPATIENT PHYSICAL THERAPY  DAILY NOTE  [facility] ICU STEPDOWN TELEMETRY 4K - [id number]      Time In: 0830  Time Out: 6432  Timed Code Treatment Minutes: 24 Minutes  Minutes: 24          Date: 2022  Patient Name: Brianna Long,  Gender:  male        MRN: [id number]  : 1960  (62 y.o.)     Referring Practitioner: Lindsay Moody DO  Diagnosis: Swelling  Additional Pertinent Hx: Brianna Long is a 62 y.o. male with PMHx as noted below who presents to [address] [place] [address] for evaluation of worsening GUZMAN, tachypnea and lower extremity swelling. Patient recently was discharged from [facility] where he was there for 2 weeks for a AAA repair. Patient was discharged to [facility] for further rehab. Reports that he arrived there on the day prior to admission around 2 PM.  On the morning of admission patient states that he tried to walk around and noticed that after just a few feet he would get short of breath. Later in the morning he noticed that his upper left leg started getting sore. He subsequently started developing hyperventilation approximately 1 hour after. EMS was called and patient was brought to the ED for further evaluation. Patient states that he has never had a blood clot before in the past.  On arrival patient was found to be afebrile, tachypnea, tachycardic and hypertensive. He was placed on 3 L of O2 via NC. CTA chest revealed heavy burden of pulmonary emboli within the segmental and subsegmental arteries of all 5 lobes. VL venous duplex ultrasound of the lower extremities revealed a large left DVT with extension from the veins in the calf to the left common femoral vein. There is also a DVT in the right common femoral, proximal superficial femoral and peroneal veins. Placed on heparin gtt.  and immediately transferred to IR where Dr. TIFFANY David performed a thrombectomy of the LLE as well as angioplasty of the IVC  -- Dr. 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 Recommends stenting tomorrow morning. Pt s/p thrombectomy 9/16 and IVC 9/18. Prior Level of Function:  Lives With: Spouse  Type of Home: House  Home Layout: Two level, Bed/Bath upstairs  Home Access: Stairs to enter with rails  Entrance Stairs - Number of Steps: couple  Home Equipment:  (None)        ADL Assistance: Independent  Homemaking Assistance: Independent  Ambulation Assistance: Independent  Transfer Assistance: Independent  Active : Yes  Additional Comments: Prior to hospitalization and AAA repair on 8/29/22, pt was living at home with wife, independent with all tasks and working FT. Pt was discharged from hospital to [facility] for continued rehab.     Restrictions/Precautions:  Restrictions/Precautions: Fall Risk       SUBJECTIVE: nursing ok'd therapy reported poss discharge home today- pt agreeable for therapy- he is a little impulsive at times and cues provided to slow down     PAIN: no number given pt c/o of a cramp in left LE when completing the steps this am    Vitals: Oxygen: 95% on room air   HR-90's at rest however following steps he elevated to 140's after ~ 5 min rest he was 105    OBJECTIVE:  Bed Mobility:  Supine to Sit: Independent  Sit to Supine: Independent     Transfers:  Sit to Stand: Stand By Assistance  Stand to Sit:Stand By Assistance    Ambulation:  Stand By Assistance  Distance: 300x1, 50xx1  Surface: Level Tile  Device:No Device  Gait Deviations:  fairly quick delfina- did have him stop for a rest break due to elevated HR   Stairs:  Stand By Assistance  Number of Steps: due to IV unable to complete full flight so completed set of 4 x 3 trials   Height: 6\" step with both rails, alt feet to ascend and marking time to descend- encouraged pt to slow down       Balance:  Dynamic balance w/o UE at support pt reaching out of base of support with supervision Exercise:  None due to elevated HR   Functional Outcome Measures: Completed  AM-PAC Inpatient Mobility without Stair Climbing Raw Score : 17  AM-PAC Inpatient without Stair Climbing T-Scale Score : 48.47    ASSESSMENT:  Assessment: Patient progressing toward established goals. Activity Tolerance:  Patient tolerance of  treatment: fair. Equipment Recommendations:Equipment Needed: No  Discharge Recommendations: Home with Assist as Needed  Plan: Current Treatment Recommendations: Strengthening, Balance training, Functional mobility training, Transfer training, Endurance training, Neuromuscular re-education, Stair training, Gait training, Therapeutic activities, Patient/Caregiver education & training, Home exercise program, Safety education & training  Plan:  (5x GM)    Patient Education  Patient Education: Plan of Care, discussed to slow down with activity due to his elevated HR this am     Goals:  Patient goals : to get stronger  Short Term Goals  Time Frame for Short term goals: by discharge  Short term goal 1: Pt to transfer supine <--> sit mod I to enable pt to get in/out of bed. Short term goal 2: Pt to transfer sit <--> stand mod I for increased functional mobility. Short term goal 3: Pt to ambulate >500 feet without AD Independently for community re-entry. Short term goal 4: Pt to ascend/descend 12 steps with HR mod I for bedroom access. Long Term Goals  Time Frame for Long term goals : NA due to short length of stay. Following session, patient left in safe position with all fall risk precautions in place.

## 2022-09-22 NOTE — CARE COORDINATION
9/22/22, 9:45 AM EDT    Patient goals/plan/ treatment preferences discussed by  and . Patient goals/plan/ treatment preferences reviewed with patient/ family. Patient/ family verbalize understanding of discharge plan and are in agreement with goal/plan/treatment preferences. Understanding was demonstrated using the teach back method. AVS provided by RN at time of discharge, which includes all necessary medical information pertaining to the patients current course of illness, treatment, post-discharge goals of care, and treatment preferences. Services At/After Discharge: [id number] [address]. Patient to discharge today, home with spouse and new [facility].  notified Vicenta White of discharge. RN made aware.

## 2022-10-05 PROBLEM — J96.01 ACUTE RESPIRATORY FAILURE WITH HYPOXIA (HCC): Status: ACTIVE | Noted: 2022-10-05

## 2022-12-19 ENCOUNTER — HOSPITAL ENCOUNTER (OUTPATIENT)
Dept: INTERVENTIONAL RADIOLOGY/VASCULAR | Age: 62
Discharge: HOME | End: 2022-12-19
Payer: COMMERCIAL

## 2022-12-19 DIAGNOSIS — I80.202 PHLEGMASIA CERULEA DOLENS OF LEFT LOWER EXTREMITY (HCC): ICD-10-CM

## 2022-12-19 DIAGNOSIS — R60.9 SWELLING: ICD-10-CM

## 2022-12-19 DIAGNOSIS — R06.02 SOB (SHORTNESS OF BREATH): ICD-10-CM

## 2022-12-19 DIAGNOSIS — I82.403 ACUTE DEEP VEIN THROMBOSIS (DVT) OF BOTH LOWER EXTREMITIES, UNSPECIFIED VEIN (HCC): ICD-10-CM

## 2022-12-19 DIAGNOSIS — I26.94 MULTIPLE SUBSEGMENTAL PULMONARY EMBOLI WITHOUT ACUTE COR PULMONALE (HCC): ICD-10-CM

## 2022-12-19 PROCEDURE — 99211 OFF/OP EST MAY X REQ PHY/QHP: CPT

## 2022-12-19 PROCEDURE — 93970 EXTREMITY STUDY: CPT

## 2023-02-15 ENCOUNTER — HOSPITAL ENCOUNTER (OUTPATIENT)
Dept: ULTRASOUND IMAGING | Age: 63
Discharge: HOME | End: 2023-02-15
Payer: COMMERCIAL

## 2023-02-15 DIAGNOSIS — I82.403 ACUTE DEEP VEIN THROMBOSIS (DVT) OF BOTH LOWER EXTREMITIES, UNSPECIFIED VEIN (HCC): ICD-10-CM

## 2023-02-15 PROCEDURE — 93971 EXTREMITY STUDY: CPT
